# Patient Record
Sex: FEMALE | Race: WHITE | NOT HISPANIC OR LATINO | Employment: OTHER | URBAN - METROPOLITAN AREA
[De-identification: names, ages, dates, MRNs, and addresses within clinical notes are randomized per-mention and may not be internally consistent; named-entity substitution may affect disease eponyms.]

---

## 2017-08-17 ENCOUNTER — OFFICE VISIT (OUTPATIENT)
Dept: CARDIOLOGY | Facility: CLINIC | Age: 72
End: 2017-08-17

## 2017-08-17 VITALS
DIASTOLIC BLOOD PRESSURE: 89 MMHG | WEIGHT: 155.4 LBS | HEIGHT: 64 IN | HEART RATE: 82 BPM | SYSTOLIC BLOOD PRESSURE: 152 MMHG | BODY MASS INDEX: 26.53 KG/M2

## 2017-08-17 DIAGNOSIS — I10 BENIGN HYPERTENSION: ICD-10-CM

## 2017-08-17 DIAGNOSIS — E78.00 HYPERCHOLESTEROLEMIA: ICD-10-CM

## 2017-08-17 DIAGNOSIS — I47.1 SUPRAVENTRICULAR TACHYCARDIA (HCC): Primary | ICD-10-CM

## 2017-08-17 PROCEDURE — 99214 OFFICE O/P EST MOD 30 MIN: CPT | Performed by: PHYSICIAN ASSISTANT

## 2017-08-17 NOTE — PROGRESS NOTES
[facility] at [facility] - Office Note  Vane Villavicencio         [address] [place] [address]  1945   [phone] (home)      LOCATION:  [place] office.  Visit Type: Follow Up.    PCP:  Darrius Hilton MD    08/17/17   Vane Villavicencio is a 72 y.o.  female  retired.      Chief Complaint: FU on SVT, HTN, HLP.  PROBLEM LIST:  1.  Remote episode of supraventricular tachycardia:  a. First  diagnosed, 06/01/2012.  b. Associated tachypalpitations, dizziness, and dyspnea.  c. EKG post event revealing normal sinus rhythm with normal ME and QT intervals.  d. Echocardiogram 05/01/2014:  Normal LV function.  No valvular wall motion abnormalities.     2. Rheumatoid arthritis.  3. Benign hypertension.  4.  Hypercholesterolemia.  5. Family history of heart disease.      No Known Allergies      Current Outpatient Prescriptions:   •  Abatacept (ORENCIA) 125 MG/ML solution prefilled syringe, Inject  under the skin 1 (one) time per week., Disp: , Rfl:   •  CARTIA  MG 24 hr capsule, TAKE 1 CAPSULE BY MOUTH ONE TIME A DAY , Disp: 20 capsule, Rfl: 1  •  fluticasone (FLONASE) 50 MCG/ACT nasal spray, 2 sprays into each nostril as needed. Administer 2 sprays in each nostril for each dose. , Disp: , Rfl:   •  leflunomide (ARAVA) 20 MG tablet, Take 20 mg by mouth daily., Disp: , Rfl:   •  levothyroxine (SYNTHROID, LEVOTHROID) 75 MCG tablet, Take 75 mcg by mouth daily., Disp: , Rfl:   •  LORazepam (ATIVAN) 0.5 MG tablet, Take 0.5 mg by mouth every 8 (eight) hours as needed for anxiety., Disp: , Rfl:   •  Multiple Vitamins-Minerals ([employer] BONE HEALTH PO), Take 1 tablet by mouth daily., Disp: , Rfl:   •  pravastatin (PRAVACHOL) 80 MG tablet, Take 80 mg by mouth daily., Disp: , Rfl:   •  predniSONE (DELTASONE) 2.5 MG tablet, Take 5 mg by mouth daily., Disp: , Rfl:   •  valsartan-hydrochlorothiazide (DIOVAN-HCT) 320-25 MG per tablet, Take 1 tablet by mouth daily., Disp: , Rfl:     HPI    Ms. Villavicencio is  "here today for routine follow-up on long-standing controlled hypertension and dyslipidemia.  She also has several years history worth of SVT.  Her last episode was in 2014.  July 22 she had an event where she was getting ready for work, under a lot of stress with familial issues.  She has sudden onset of tachycardia palpitations, dyspnea and weakness.  She called EMT they were unable to get a blood pressure on her although she had no presyncopal feelings.  She was taken to the local hospital where she was given IV adenosine to break her rhythm.  She is done well since then without any complaints or symptoms.  She states this often happens when she is under a lot of stress.  She has not missed any of her medications.  Her blood work from that visit showed continued hyponatremia and hypokalemia (3).  She is discussed these levels with her rheumatologist in the past but no change has been made to her medications.  In the last 2 weeks, she states her blood pressure has been elevating some but again, she attributes this to stress.    The following portions of the patient's history were reviewed in the chart and updated as appropriate: allergies, current medications, past family history, past medical history, past social history, past surgical history and problem list.    Review of Systems   Constitution: Negative for weakness.   Cardiovascular: Positive for irregular heartbeat and palpitations. Negative for chest pain and near-syncope.   Respiratory: Negative for shortness of breath.    Musculoskeletal: Positive for arthritis.   All other systems reviewed and are negative.            height is 64\" (162.6 cm) and weight is 155 lb 6.4 oz (70.5 kg). Her blood pressure is 152/89 and her pulse is 82.   Physical Exam   Constitutional: Vital signs are normal. She appears well-developed and well-nourished.   Cardiovascular: Normal rate, regular rhythm, S1 normal, S2 normal, normal heart sounds, intact distal pulses and normal " pulses.    Pulmonary/Chest: Effort normal and breath sounds normal. She has no wheezes. She has no rhonchi. She has no rales.   Abdominal: Soft. Normal appearance and bowel sounds are normal. There is no hepatosplenomegaly.   Musculoskeletal:        Right hand: She exhibits deformity.        Left hand: She exhibits deformity.   Neurological: She is alert.         Procedures   Lipid panel: , , HDL 33, .  Sodium 132, potassium 3, creatinine 1.3.  TSH 0.77, free T4 was elevated.  Assessment/ Plan   Supraventricular tachycardia:  This was her first episode in 3 years.  At this time and went to continue on her current dose of Cardizem.  We are closely following her hypertension and may need to make medication changes in the near future.    Hypercholesterolemia:  Continue Pravachol and appropriate diet.  Get routine lipid panel and CMP with PCP.    Benign hypertension:  Poorly controlled today.  I've asked the patient keep a blood pressure log for the next 2 weeks and we will call her to discuss.  I did note her blood work from July ED visit and compared it to laboratory values drawn in May.  She is consistently been hyponatremic and hypokalemic.  I discussed with her that we will most likely need to stop or change her Diovan HCTZ and increase her Cardizem.  Further recognitions will be based on blood pressure readings.      Ludy Beckett PA-C  8/17/2017 11:14 AM      EMR Dragon/Transcription disclaimer:   Much of this encounter note is an electronic transcription/translation of spoken language to printed text. The electronic translation of spoken language may permit erroneous, or at times, nonsensical words or phrases to be inadvertently transcribed; Although I have reviewed the note for such errors, some may still exist.

## 2017-08-28 ENCOUNTER — TELEPHONE (OUTPATIENT)
Dept: CARDIOLOGY | Facility: CLINIC | Age: 72
End: 2017-08-28

## 2017-08-28 DIAGNOSIS — I15.9 SECONDARY HYPERTENSION: Primary | ICD-10-CM

## 2017-08-28 NOTE — TELEPHONE ENCOUNTER
Called patient for b/p readings:  8/17/17  /66  76  /73 76  8/18/17  AM  131/73  73  PM  123/73  68  8/19/17  AM  129/76   72  Pm  128/67  72  8/20/17  AM   129/74   76  PM  123/71 76  8/21/17  AM  137/79  73  8/22/17  AM  131/72  81 PM  127/72  68  8/23/17  AM  141/81  66  PM  138/67  79  8/24/17  AM  130/68  80  PM  124/67  70  8/25/17  AM  127/63  84  PM  117/70  78  8/26/17  /68  78    Barbara Case reviewed b/p readings in clinic. No changes made. Wants a BMP in one month. Told patient this and she verbalized understanding.

## 2017-10-03 ENCOUNTER — LAB (OUTPATIENT)
Dept: LAB | Facility: HOSPITAL | Age: 72
End: 2017-10-03

## 2017-10-03 DIAGNOSIS — I15.9 SECONDARY HYPERTENSION: ICD-10-CM

## 2017-10-03 LAB
ANION GAP SERPL CALCULATED.3IONS-SCNC: 13 MMOL/L (ref 3–11)
BUN BLD-MCNC: 12 MG/DL (ref 9–23)
BUN/CREAT SERPL: 15 (ref 7–25)
CALCIUM SPEC-SCNC: 9.8 MG/DL (ref 8.7–10.4)
CHLORIDE SERPL-SCNC: 97 MMOL/L (ref 99–109)
CO2 SERPL-SCNC: 26 MMOL/L (ref 20–31)
CREAT BLD-MCNC: 0.8 MG/DL (ref 0.6–1.3)
GFR SERPL CREATININE-BSD FRML MDRD: 71 ML/MIN/1.73
GLUCOSE BLD-MCNC: 90 MG/DL (ref 70–100)
POTASSIUM BLD-SCNC: 3.5 MMOL/L (ref 3.5–5.5)
SODIUM BLD-SCNC: 136 MMOL/L (ref 132–146)

## 2017-10-03 PROCEDURE — 80048 BASIC METABOLIC PNL TOTAL CA: CPT | Performed by: PHYSICIAN ASSISTANT

## 2017-10-03 PROCEDURE — 36415 COLL VENOUS BLD VENIPUNCTURE: CPT

## 2017-11-05 ENCOUNTER — HOSPITAL ENCOUNTER (EMERGENCY)
Facility: HOSPITAL | Age: 72
Discharge: HOME | End: 2017-11-05
Attending: EMERGENCY MEDICINE | Admitting: EMERGENCY MEDICINE

## 2017-11-05 VITALS
TEMPERATURE: 96.8 F | SYSTOLIC BLOOD PRESSURE: 110 MMHG | DIASTOLIC BLOOD PRESSURE: 65 MMHG | HEIGHT: 64 IN | BODY MASS INDEX: 26.12 KG/M2 | RESPIRATION RATE: 18 BRPM | WEIGHT: 153 LBS | HEART RATE: 84 BPM | OXYGEN SATURATION: 96 %

## 2017-11-05 DIAGNOSIS — I47.1 SUPRAVENTRICULAR TACHYCARDIA (HCC): Primary | ICD-10-CM

## 2017-11-05 LAB
ANION GAP SERPL CALCULATED.3IONS-SCNC: 19.2 MMOL/L
BASOPHILS # BLD AUTO: 0.12 10*3/MM3 (ref 0–0.2)
BASOPHILS NFR BLD AUTO: 1.1 % (ref 0–2.5)
BUN BLD-MCNC: 15 MG/DL (ref 7–20)
BUN/CREAT SERPL: 12.5 (ref 7.1–23.5)
CALCIUM SPEC-SCNC: 10.3 MG/DL (ref 8.4–10.2)
CHLORIDE SERPL-SCNC: 94 MMOL/L (ref 98–107)
CO2 SERPL-SCNC: 23 MMOL/L (ref 26–30)
CREAT BLD-MCNC: 1.2 MG/DL (ref 0.6–1.3)
DEPRECATED RDW RBC AUTO: 41.2 FL (ref 37–54)
EOSINOPHIL # BLD AUTO: 0.04 10*3/MM3 (ref 0–0.7)
EOSINOPHIL NFR BLD AUTO: 0.4 % (ref 0–7)
ERYTHROCYTE [DISTWIDTH] IN BLOOD BY AUTOMATED COUNT: 12.8 % (ref 11.5–14.5)
GFR SERPL CREATININE-BSD FRML MDRD: 44 ML/MIN/1.73
GLUCOSE BLD-MCNC: 164 MG/DL (ref 74–98)
HCT VFR BLD AUTO: 38.3 % (ref 37–47)
HGB BLD-MCNC: 12.8 G/DL (ref 12–16)
HOLD SPECIMEN: NORMAL
HOLD SPECIMEN: NORMAL
IMM GRANULOCYTES # BLD: 0.04 10*3/MM3 (ref 0–0.06)
IMM GRANULOCYTES NFR BLD: 0.4 % (ref 0–0.6)
LYMPHOCYTES # BLD AUTO: 1.6 10*3/MM3 (ref 0.6–3.4)
LYMPHOCYTES NFR BLD AUTO: 14.1 % (ref 10–50)
MAGNESIUM SERPL-MCNC: 1.9 MG/DL (ref 1.6–2.3)
MCH RBC QN AUTO: 29.4 PG (ref 27–31)
MCHC RBC AUTO-ENTMCNC: 33.4 G/DL (ref 30–37)
MCV RBC AUTO: 88 FL (ref 81–99)
MONOCYTES # BLD AUTO: 0.62 10*3/MM3 (ref 0–0.9)
MONOCYTES NFR BLD AUTO: 5.5 % (ref 0–12)
NEUTROPHILS # BLD AUTO: 8.9 10*3/MM3 (ref 2–6.9)
NEUTROPHILS NFR BLD AUTO: 78.5 % (ref 37–80)
NRBC BLD MANUAL-RTO: 0 /100 WBC (ref 0–0)
PLATELET # BLD AUTO: 232 10*3/MM3 (ref 130–400)
PMV BLD AUTO: 11 FL (ref 6–12)
POTASSIUM BLD-SCNC: 3.2 MMOL/L (ref 3.5–5.1)
RBC # BLD AUTO: 4.35 10*6/MM3 (ref 4.2–5.4)
SODIUM BLD-SCNC: 133 MMOL/L (ref 137–145)
WBC NRBC COR # BLD: 11.32 10*3/MM3 (ref 4.8–10.8)
WHOLE BLOOD HOLD SPECIMEN: NORMAL
WHOLE BLOOD HOLD SPECIMEN: NORMAL

## 2017-11-05 PROCEDURE — 93005 ELECTROCARDIOGRAM TRACING: CPT

## 2017-11-05 PROCEDURE — 93005 ELECTROCARDIOGRAM TRACING: CPT | Performed by: EMERGENCY MEDICINE

## 2017-11-05 PROCEDURE — 25010000002 ADENOSINE PER 6 MG

## 2017-11-05 PROCEDURE — 96374 THER/PROPH/DIAG INJ IV PUSH: CPT

## 2017-11-05 PROCEDURE — 80048 BASIC METABOLIC PNL TOTAL CA: CPT | Performed by: EMERGENCY MEDICINE

## 2017-11-05 PROCEDURE — 99284 EMERGENCY DEPT VISIT MOD MDM: CPT

## 2017-11-05 PROCEDURE — 85025 COMPLETE CBC W/AUTO DIFF WBC: CPT | Performed by: EMERGENCY MEDICINE

## 2017-11-05 PROCEDURE — 83735 ASSAY OF MAGNESIUM: CPT | Performed by: EMERGENCY MEDICINE

## 2017-11-05 RX ORDER — ADENOSINE 3 MG/ML
6 INJECTION, SOLUTION INTRAVENOUS ONCE
Status: COMPLETED | OUTPATIENT
Start: 2017-11-05 | End: 2017-11-05

## 2017-11-05 RX ORDER — POTASSIUM CHLORIDE 1.5 G/1.77G
40 POWDER, FOR SOLUTION ORAL ONCE
Status: COMPLETED | OUTPATIENT
Start: 2017-11-05 | End: 2017-11-05

## 2017-11-05 RX ORDER — POTASSIUM CHLORIDE 20 MEQ/1
40 TABLET, EXTENDED RELEASE ORAL DAILY
Qty: 6 TABLET | Refills: 0 | Status: SHIPPED | OUTPATIENT
Start: 2017-11-05 | End: 2017-11-08

## 2017-11-05 RX ORDER — ADENOSINE 3 MG/ML
INJECTION, SOLUTION INTRAVENOUS
Status: COMPLETED
Start: 2017-11-05 | End: 2017-11-05

## 2017-11-05 RX ORDER — SODIUM CHLORIDE 0.9 % (FLUSH) 0.9 %
10 SYRINGE (ML) INJECTION AS NEEDED
Status: DISCONTINUED | OUTPATIENT
Start: 2017-11-05 | End: 2017-11-05 | Stop reason: HOSPADM

## 2017-11-05 RX ORDER — CIPROFLOXACIN 500 MG/1
500 TABLET, FILM COATED ORAL 2 TIMES DAILY
COMMUNITY
End: 2020-02-20 | Stop reason: HOSPADM

## 2017-11-05 RX ADMIN — ADENOSINE 6 MG: 3 INJECTION, SOLUTION INTRAVENOUS at 13:10

## 2017-11-05 RX ADMIN — POTASSIUM CHLORIDE 40 MEQ: 1.5 POWDER, FOR SOLUTION ORAL at 13:49

## 2017-11-05 NOTE — ED PROVIDER NOTES
Subjective   History of Present Illness  72-year-old female with a history of supraventricular tachycardia presenting with palpitations, brief episode of lightheadedness, diaphoresis similar to prior episodes of SVT.  States that this often occurs when she is on antibiotics and she was recently placed on Cipro for UTI.  She denies any chest pain, lightheadedness, or other symptoms at this time but does feel as though her heart rate is still elevated.  On monitor, she is noted to be tachycardic with a heart rate from 165-185 consistent with SVT.    Review of Systems   Cardiovascular: Positive for palpitations.   All other systems reviewed and are negative.      Past Medical History:   Diagnosis Date   • Benign hypertension    • Family history of heart disease    • Family history of heart disease    • Hypercholesterolemia    • Rheumatoid arthritis    • Rheumatoid arthritis    • Supraventricular tachycardia     first diagnosed 06/01/2012   • Supraventricular tachycardia        No Known Allergies    History reviewed. No pertinent surgical history.    Family History   Problem Relation Age of Onset   • Heart disease Other    • Hypertension Sister        Social History     Social History   • Marital status:      Spouse name: N/A   • Number of children: N/A   • Years of education: N/A     Social History Main Topics   • Smoking status: Never Smoker   • Smokeless tobacco: None   • Alcohol use No   • Drug use: No   • Sexual activity: Defer     Other Topics Concern   • None     Social History Narrative           Objective   Physical Exam   Constitutional: She is oriented to person, place, and time. She appears well-developed and well-nourished. No distress.   HENT:   Head: Normocephalic.   Mouth/Throat: Oropharynx is clear and moist. No oropharyngeal exudate.   Eyes: No scleral icterus.   Neck: Neck supple. No tracheal deviation present.   Cardiovascular: Regular rhythm, normal heart sounds and intact distal pulses.   Exam reveals no gallop and no friction rub.    No murmur heard.  Tachycardic to the 160s on arrival.   Pulmonary/Chest: Effort normal and breath sounds normal. No stridor. No respiratory distress. She has no wheezes. She has no rales.   Abdominal: Soft. She exhibits no distension and no mass. There is no tenderness. There is no rebound and no guarding. No hernia.   Musculoskeletal: She exhibits no edema or deformity.   Neurological: She is alert and oriented to person, place, and time.   Skin: Skin is warm and dry. She is not diaphoretic. No erythema. No pallor.   Psychiatric: She has a normal mood and affect. Her behavior is normal.   Nursing note and vitals reviewed.      Procedures         ED Course  ED Course                  MDM   72-year-old female with a history of episodic SVT here with palpitations in the setting of SVT.  She was initially slightly hypotensive and symptomatic but by the time I saw her stated that she was feeling at baseline other than her palpitations and her blood pressure had improved to the 100s over 70s.  We placed pads, established IV, and gave 6 mg of adenosine which converted her to a normal sinus rhythm.  Repeat EKG showed a normal sinus rhythm with normal intervals and no ST elevation or depression.  We have sent lab work including CBC, BMP, magnesium, we'll continue to monitor, and if she remains stable, discharge home with information to follow-up with Dr. Olmos.    2:07 PM Pt has remained stable since receiving adenosine with NSR. Labs remarkable for mild hypokalemia (3.2). Given 40meq PO and will d/c with three days of 40meq. Will also give information to f/u w/ Dr. Olmos. Discussed return to care precautions.     Final diagnoses:   Supraventricular tachycardia            Juan Manuel Gr MD  11/05/17 4820

## 2018-01-19 ENCOUNTER — DOCUMENTATION (OUTPATIENT)
Dept: CARDIOLOGY | Facility: CLINIC | Age: 73
End: 2018-01-19

## 2018-09-05 ENCOUNTER — HOSPITAL ENCOUNTER (EMERGENCY)
Age: 73
Discharge: HOME | End: 2018-09-05
Attending: EMERGENCY MEDICINE
Payer: MEDICARE

## 2018-09-05 VITALS
DIASTOLIC BLOOD PRESSURE: 88 MMHG | BODY MASS INDEX: 25.99 KG/M2 | WEIGHT: 156 LBS | RESPIRATION RATE: 18 BRPM | OXYGEN SATURATION: 97 % | TEMPERATURE: 98.4 F | SYSTOLIC BLOOD PRESSURE: 166 MMHG | HEART RATE: 78 BPM | HEIGHT: 65 IN

## 2018-09-05 DIAGNOSIS — E87.6 HYPOKALEMIA: Primary | ICD-10-CM

## 2018-09-05 DIAGNOSIS — E83.42 HYPOMAGNESEMIA: ICD-10-CM

## 2018-09-05 DIAGNOSIS — E87.1 HYPONATREMIA: ICD-10-CM

## 2018-09-05 LAB
ANION GAP SERPL CALCULATED.3IONS-SCNC: 15 MMOL/L (ref 3–16)
BUN BLDV-MCNC: 10 MG/DL (ref 7–20)
CALCIUM SERPL-MCNC: 9.3 MG/DL (ref 8.3–10.6)
CHLORIDE BLD-SCNC: 86 MMOL/L (ref 99–110)
CO2: 26 MMOL/L (ref 21–32)
CREAT SERPL-MCNC: 0.7 MG/DL (ref 0.6–1.2)
GFR AFRICAN AMERICAN: >60
GFR NON-AFRICAN AMERICAN: >60
GLUCOSE BLD-MCNC: 117 MG/DL (ref 70–99)
MAGNESIUM: 1.5 MG/DL (ref 1.8–2.4)
POTASSIUM REFLEX MAGNESIUM: 3.3 MMOL/L (ref 3.5–5.1)
SODIUM BLD-SCNC: 127 MMOL/L (ref 136–145)
TROPONIN: <0.01 NG/ML

## 2018-09-05 PROCEDURE — 84484 ASSAY OF TROPONIN QUANT: CPT

## 2018-09-05 PROCEDURE — 6370000000 HC RX 637 (ALT 250 FOR IP): Performed by: EMERGENCY MEDICINE

## 2018-09-05 PROCEDURE — 93005 ELECTROCARDIOGRAM TRACING: CPT | Performed by: EMERGENCY MEDICINE

## 2018-09-05 PROCEDURE — 99284 EMERGENCY DEPT VISIT MOD MDM: CPT

## 2018-09-05 PROCEDURE — 83735 ASSAY OF MAGNESIUM: CPT

## 2018-09-05 PROCEDURE — 36415 COLL VENOUS BLD VENIPUNCTURE: CPT

## 2018-09-05 PROCEDURE — 80048 BASIC METABOLIC PNL TOTAL CA: CPT

## 2018-09-05 RX ORDER — POTASSIUM CHLORIDE 750 MG/1
10 TABLET, EXTENDED RELEASE ORAL ONCE
Status: COMPLETED | OUTPATIENT
Start: 2018-09-05 | End: 2018-09-05

## 2018-09-05 RX ORDER — LEVOTHYROXINE SODIUM 0.05 MG/1
50 TABLET ORAL DAILY
COMMUNITY

## 2018-09-05 RX ORDER — LEFLUNOMIDE 20 MG/1
20 TABLET ORAL DAILY
COMMUNITY

## 2018-09-05 RX ORDER — MAGNESIUM CHLORIDE 64 MG
1 TABLET, DELAYED RELEASE (ENTERIC COATED) ORAL
Status: DISCONTINUED | OUTPATIENT
Start: 2018-09-06 | End: 2018-09-05

## 2018-09-05 RX ORDER — MONTELUKAST SODIUM 10 MG/1
10 TABLET ORAL NIGHTLY
COMMUNITY

## 2018-09-05 RX ORDER — PREDNISONE 1 MG/1
5 TABLET ORAL DAILY
COMMUNITY

## 2018-09-05 RX ORDER — DILTIAZEM HYDROCHLORIDE 180 MG/1
180 CAPSULE, COATED, EXTENDED RELEASE ORAL DAILY
COMMUNITY

## 2018-09-05 RX ORDER — LOSARTAN POTASSIUM 50 MG/1
50 TABLET ORAL DAILY
COMMUNITY

## 2018-09-05 RX ADMIN — POTASSIUM CHLORIDE 10 MEQ: 10 TABLET, EXTENDED RELEASE ORAL at 22:05

## 2018-09-06 PROCEDURE — 93010 ELECTROCARDIOGRAM REPORT: CPT | Performed by: INTERNAL MEDICINE

## 2018-09-06 NOTE — ED NOTES
This RN called pharmacy to check for availability of oral mag, was told by pharm we did not have that option. This RN informed MD, MD informed RN to cancel mag order for pt and to tell pt to purchase OTC mag supplement.       Alisa Yee RN  09/05/18 [id number]

## 2018-09-06 NOTE — ED PROVIDER NOTES
Emergency Department Attending Note    Joselyn Marin MD    Date of ED VIsit: 9/5/2018    CHIEF COMPLAINT  Hypertension (pt states she had increased bp of 180's/110's this pm, took extra BP pill an hour PTA and bp is still high, has hx of SVT, denies current CP, states she also got flushed and had heartburn at the time of the initial BP reading )      HISTORY OF PRESENT ILLNESS  Alonso Matson is a 72 y.o. female  With Vital signs of BP (!) 183/105   Pulse 96   Temp 98.4 °F (36.9 °C) (Oral)   Resp 18   Ht 5' 4.5\" (1.638 m)   Wt 156 lb (70.8 kg)   SpO2 99%   BMI 26.36 kg/m²  who presents to the ED with a complaint of blood pressure. Patient seen and evaluated in room 7. Patient comes in complaining of a blood pressure of 180s over 110 which is abnormal for her. She states she took her losartan this morning and then took a repeat dose this evening in response to that blood pressure. She denied any chest pain but that did not did state that she had some indigestion which is resolved spontaneously. She denies any neurologic symptoms. No shortness of breath. No nausea vomiting diarrhea constipation. She states that she was originally on Diovan but was removed from that and placed on losartan and since then has had some mild fluctuations in her blood pressure not to the point that she seen today. Repeat blood pressures in the room during my exam revealed a systolic blood pressure 892 with a diastolic blood pressure in the 90s patient also reports taking lorazepam one half tablet for her anxiety as well No other complaints, modifying factors or associated symptoms. I have reviewed the following from the nursing documentation. No past medical history on file. No past surgical history on file. No family history on file. Social History     Social History    Marital status:      Spouse name: N/A    Number of children: N/A    Years of education: N/A     Occupational History    Not on file.

## 2018-09-06 NOTE — ED NOTES
Pt to ER via self, pt states she had increased bp of 180's/110's this pm, took extra BP pill an hour PTA and bp is still high, has hx of SVT, denies current CP, states she also got flushed and had heartburn at the time of the initial BP reading. Pt alert and oriented, EKG completed and pt on monitor. MD as bedside. Pt without current distress. Call light within reach, will cont to monitor.       Zeinab Fraknlin RN  09/05/18 1497

## 2018-09-21 LAB
EKG ATRIAL RATE: 93 BPM
EKG DIAGNOSIS: NORMAL
EKG P AXIS: 58 DEGREES
EKG P-R INTERVAL: 144 MS
EKG Q-T INTERVAL: 374 MS
EKG QRS DURATION: 94 MS
EKG QTC CALCULATION (BAZETT): 465 MS
EKG R AXIS: 79 DEGREES
EKG T AXIS: 57 DEGREES
EKG VENTRICULAR RATE: 93 BPM

## 2019-09-19 ENCOUNTER — TRANSCRIBE ORDERS (OUTPATIENT)
Dept: MAMMOGRAPHY | Facility: HOSPITAL | Age: 74
End: 2019-09-19

## 2019-09-19 DIAGNOSIS — Z12.39 BREAST CANCER SCREENING: Primary | ICD-10-CM

## 2019-10-12 ENCOUNTER — HOSPITAL ENCOUNTER (EMERGENCY)
Facility: HOSPITAL | Age: 74
Discharge: HOME | End: 2019-10-12
Attending: STUDENT IN AN ORGANIZED HEALTH CARE EDUCATION/TRAINING PROGRAM | Admitting: STUDENT IN AN ORGANIZED HEALTH CARE EDUCATION/TRAINING PROGRAM

## 2019-10-12 ENCOUNTER — APPOINTMENT (OUTPATIENT)
Dept: GENERAL RADIOLOGY | Facility: HOSPITAL | Age: 74
End: 2019-10-12

## 2019-10-12 VITALS
DIASTOLIC BLOOD PRESSURE: 71 MMHG | HEART RATE: 90 BPM | WEIGHT: 161.4 LBS | SYSTOLIC BLOOD PRESSURE: 129 MMHG | TEMPERATURE: 97.6 F | RESPIRATION RATE: 18 BRPM | BODY MASS INDEX: 27.55 KG/M2 | OXYGEN SATURATION: 97 % | HEIGHT: 64 IN

## 2019-10-12 DIAGNOSIS — I47.1 SVT (SUPRAVENTRICULAR TACHYCARDIA) (HCC): Primary | ICD-10-CM

## 2019-10-12 LAB
ALBUMIN SERPL-MCNC: 4.1 G/DL (ref 3.5–5.2)
ALBUMIN/GLOB SERPL: 1.2 G/DL
ALP SERPL-CCNC: 66 U/L (ref 39–117)
ALT SERPL W P-5'-P-CCNC: 18 U/L (ref 1–33)
ANION GAP SERPL CALCULATED.3IONS-SCNC: 18.5 MMOL/L (ref 5–15)
AST SERPL-CCNC: 22 U/L (ref 1–32)
BASOPHILS # BLD AUTO: 0.17 10*3/MM3 (ref 0–0.2)
BASOPHILS NFR BLD AUTO: 1.4 % (ref 0–1.5)
BILIRUB SERPL-MCNC: 0.3 MG/DL (ref 0.2–1.2)
BUN BLD-MCNC: 13 MG/DL (ref 8–23)
BUN/CREAT SERPL: 11.6 (ref 7–25)
CALCIUM SPEC-SCNC: 10 MG/DL (ref 8.6–10.5)
CHLORIDE SERPL-SCNC: 91 MMOL/L (ref 98–107)
CO2 SERPL-SCNC: 23.5 MMOL/L (ref 22–29)
CREAT BLD-MCNC: 1.12 MG/DL (ref 0.57–1)
DEPRECATED RDW RBC AUTO: 44.2 FL (ref 37–54)
EOSINOPHIL # BLD AUTO: 0.23 10*3/MM3 (ref 0–0.4)
EOSINOPHIL NFR BLD AUTO: 1.9 % (ref 0.3–6.2)
ERYTHROCYTE [DISTWIDTH] IN BLOOD BY AUTOMATED COUNT: 13.2 % (ref 12.3–15.4)
GFR SERPL CREATININE-BSD FRML MDRD: 48 ML/MIN/1.73
GLOBULIN UR ELPH-MCNC: 3.5 GM/DL
GLUCOSE BLD-MCNC: 166 MG/DL (ref 65–99)
HCT VFR BLD AUTO: 38.5 % (ref 34–46.6)
HGB BLD-MCNC: 12.1 G/DL (ref 12–15.9)
HOLD SPECIMEN: NORMAL
HOLD SPECIMEN: NORMAL
IMM GRANULOCYTES # BLD AUTO: 0.04 10*3/MM3 (ref 0–0.05)
IMM GRANULOCYTES NFR BLD AUTO: 0.3 % (ref 0–0.5)
LIPASE SERPL-CCNC: 42 U/L (ref 13–60)
LYMPHOCYTES # BLD AUTO: 4.02 10*3/MM3 (ref 0.7–3.1)
LYMPHOCYTES NFR BLD AUTO: 33.3 % (ref 19.6–45.3)
MCH RBC QN AUTO: 28.5 PG (ref 26.6–33)
MCHC RBC AUTO-ENTMCNC: 31.4 G/DL (ref 31.5–35.7)
MCV RBC AUTO: 90.8 FL (ref 79–97)
MONOCYTES # BLD AUTO: 1.03 10*3/MM3 (ref 0.1–0.9)
MONOCYTES NFR BLD AUTO: 8.5 % (ref 5–12)
NEUTROPHILS # BLD AUTO: 6.58 10*3/MM3 (ref 1.7–7)
NEUTROPHILS NFR BLD AUTO: 54.6 % (ref 42.7–76)
NRBC BLD AUTO-RTO: 0 /100 WBC (ref 0–0.2)
PLATELET # BLD AUTO: 302 10*3/MM3 (ref 140–450)
PMV BLD AUTO: 10.3 FL (ref 6–12)
POTASSIUM BLD-SCNC: 3.8 MMOL/L (ref 3.5–5.2)
PROT SERPL-MCNC: 7.6 G/DL (ref 6–8.5)
RBC # BLD AUTO: 4.24 10*6/MM3 (ref 3.77–5.28)
SODIUM BLD-SCNC: 133 MMOL/L (ref 136–145)
TROPONIN T SERPL-MCNC: <0.01 NG/ML (ref 0–0.03)
WBC NRBC COR # BLD: 12.07 10*3/MM3 (ref 3.4–10.8)
WHOLE BLOOD HOLD SPECIMEN: NORMAL
WHOLE BLOOD HOLD SPECIMEN: NORMAL

## 2019-10-12 PROCEDURE — 93005 ELECTROCARDIOGRAM TRACING: CPT | Performed by: STUDENT IN AN ORGANIZED HEALTH CARE EDUCATION/TRAINING PROGRAM

## 2019-10-12 PROCEDURE — 25010000002 ADENOSINE PER 6 MG

## 2019-10-12 PROCEDURE — 99284 EMERGENCY DEPT VISIT MOD MDM: CPT

## 2019-10-12 PROCEDURE — 84484 ASSAY OF TROPONIN QUANT: CPT | Performed by: STUDENT IN AN ORGANIZED HEALTH CARE EDUCATION/TRAINING PROGRAM

## 2019-10-12 PROCEDURE — 71045 X-RAY EXAM CHEST 1 VIEW: CPT

## 2019-10-12 PROCEDURE — 85025 COMPLETE CBC W/AUTO DIFF WBC: CPT | Performed by: STUDENT IN AN ORGANIZED HEALTH CARE EDUCATION/TRAINING PROGRAM

## 2019-10-12 PROCEDURE — 80053 COMPREHEN METABOLIC PANEL: CPT | Performed by: STUDENT IN AN ORGANIZED HEALTH CARE EDUCATION/TRAINING PROGRAM

## 2019-10-12 PROCEDURE — 83690 ASSAY OF LIPASE: CPT | Performed by: STUDENT IN AN ORGANIZED HEALTH CARE EDUCATION/TRAINING PROGRAM

## 2019-10-12 RX ORDER — ADENOSINE 3 MG/ML
INJECTION, SOLUTION INTRAVENOUS
Status: COMPLETED
Start: 2019-10-12 | End: 2019-10-12

## 2019-10-12 RX ORDER — SODIUM CHLORIDE 0.9 % (FLUSH) 0.9 %
10 SYRINGE (ML) INJECTION AS NEEDED
Status: DISCONTINUED | OUTPATIENT
Start: 2019-10-12 | End: 2019-10-12 | Stop reason: HOSPADM

## 2019-10-12 RX ADMIN — ADENOSINE 6 MG: 3 INJECTION INTRAVENOUS at 14:34

## 2019-10-12 NOTE — ED PROVIDER NOTES
Subjective   Patient is a 74-year-old female presents with heart racing and pain in her neck started suddenly less than 20 minutes ago.  Patient states that she believes she is having an SVT attack.  Patient states she has these once or twice a year and has for the past 7 years.  Patient states normally they give her adenosine and her heart rate normalizes.  She states there was no trigger.  States she did try to convert herself while driving here by holding her breath.  Nothing makes this better or worse.  Patient states she does feel very nervous and short of breath.            Review of Systems   All other systems reviewed and are negative.      Past Medical History:   Diagnosis Date   • Benign hypertension    • Family history of heart disease    • Family history of heart disease    • Hypercholesterolemia    • Rheumatoid arthritis (CMS/HCC)    • Rheumatoid arthritis (CMS/HCC)    • Supraventricular tachycardia (CMS/HCC)     first diagnosed 06/01/2012   • Supraventricular tachycardia (CMS/HCC)        No Known Allergies    History reviewed. No pertinent surgical history.    Family History   Problem Relation Age of Onset   • Heart disease Other    • Hypertension Sister        Social History     Socioeconomic History   • Marital status:      Spouse name: Not on file   • Number of children: Not on file   • Years of education: Not on file   • Highest education level: Not on file   Tobacco Use   • Smoking status: Never Smoker   Substance and Sexual Activity   • Alcohol use: No   • Drug use: No   • Sexual activity: Defer           Objective   Physical Exam   Nursing note and vitals reviewed.    GEN: Patient appears frightened   head: Normocephalic, atraumatic  Eyes: Pupils equal round reactive to light  ENT: Posterior pharynx normal in appearance, oral mucosa is moist  Chest: Nontender to palpation  Cardiovascular: Regular in the 190s   lungs: Clear to auscultation bilaterally  Abdomen: Soft, nontender,  nondistended, no peritoneal signs  Neuro: GCS 15  Psych: Appears anxious      Chemical Cardioversion  Date/Time: 10/12/2019 4:18 PM  Performed by: Miguel A Dimas MD  Authorized by: Miguel A Dimas MD     Consent:     Consent obtained:  Verbal    Consent given by:  Patient    Risks discussed:  Death, induced arrhythmia and pain  Pre-procedure details:     Cardioversion basis:  Emergent    Rhythm:  Supraventricular tachycardia  Attempt one:     Cardioversion medication:  Adenosine 6mg      Medication outcome:  Conversion to normal sinus rhythm  Post-procedure details:     Patient status:  Awake    Patient tolerance of procedure:  Tolerated well, no immediate complications               ED Course  ED Course as of Oct 12 1616   Sat Oct 12, 2019   1515 EKG shows SVT with a rate of 191.  Nonspecific ST segments.  Abnormal EKG.  Interpreted by me.  [DT]      ED Course User Index  [DT] Miguel A Dimas MD                  MDM  Number of Diagnoses or Management Options  SVT (supraventricular tachycardia) (CMS/HCC):   Diagnosis management comments: Patient was chemically cardioverted successfully with one attempt with 6 mg of adenosine  No significant laboratory abnormalities  Chest x-ray shows no acute cardiopulmonary pathology        35 minutes critical care time was spent by the attending physician excluding separately billable procedures                 Amount and/or Complexity of Data Reviewed  Clinical lab tests: reviewed  Tests in the radiology section of CPT®: reviewed  Discussion of test results with the performing providers: yes  Decide to obtain previous medical records or to obtain history from someone other than the patient: yes  Obtain history from someone other than the patient: yes  Review and summarize past medical records: yes  Independent visualization of images, tracings, or specimens: yes        Final diagnoses:   SVT (supraventricular tachycardia) (CMS/HCC)              Miguel A Dimas MD  10/12/19  [id number]

## 2019-11-19 ENCOUNTER — HOSPITAL ENCOUNTER (OUTPATIENT)
Dept: MAMMOGRAPHY | Facility: HOSPITAL | Age: 74
End: 2019-11-19

## 2019-12-13 ENCOUNTER — HOSPITAL ENCOUNTER (EMERGENCY)
Facility: HOSPITAL | Age: 74
Discharge: HOME | End: 2019-12-13
Attending: EMERGENCY MEDICINE | Admitting: EMERGENCY MEDICINE

## 2019-12-13 ENCOUNTER — APPOINTMENT (OUTPATIENT)
Dept: GENERAL RADIOLOGY | Facility: HOSPITAL | Age: 74
End: 2019-12-13

## 2019-12-13 VITALS
DIASTOLIC BLOOD PRESSURE: 80 MMHG | TEMPERATURE: 98.3 F | RESPIRATION RATE: 18 BRPM | SYSTOLIC BLOOD PRESSURE: 140 MMHG | OXYGEN SATURATION: 97 % | WEIGHT: 158 LBS | BODY MASS INDEX: 26.98 KG/M2 | HEART RATE: 78 BPM | HEIGHT: 64 IN

## 2019-12-13 DIAGNOSIS — I47.1 SVT (SUPRAVENTRICULAR TACHYCARDIA) (HCC): Primary | ICD-10-CM

## 2019-12-13 LAB
ALBUMIN SERPL-MCNC: 4.2 G/DL (ref 3.5–5.2)
ALBUMIN/GLOB SERPL: 1.2 G/DL
ALP SERPL-CCNC: 58 U/L (ref 39–117)
ALT SERPL W P-5'-P-CCNC: 19 U/L (ref 1–33)
ANION GAP SERPL CALCULATED.3IONS-SCNC: 18.2 MMOL/L (ref 5–15)
AST SERPL-CCNC: 22 U/L (ref 1–32)
BASOPHILS # BLD AUTO: 0.12 10*3/MM3 (ref 0–0.2)
BASOPHILS NFR BLD AUTO: 1.1 % (ref 0–1.5)
BILIRUB SERPL-MCNC: 0.2 MG/DL (ref 0.2–1.2)
BUN BLD-MCNC: 19 MG/DL (ref 8–23)
BUN/CREAT SERPL: 20.7 (ref 7–25)
CALCIUM SPEC-SCNC: 9.8 MG/DL (ref 8.6–10.5)
CHLORIDE SERPL-SCNC: 90 MMOL/L (ref 98–107)
CO2 SERPL-SCNC: 23.8 MMOL/L (ref 22–29)
CREAT BLD-MCNC: 0.92 MG/DL (ref 0.57–1)
DEPRECATED RDW RBC AUTO: 43.5 FL (ref 37–54)
EOSINOPHIL # BLD AUTO: 0.12 10*3/MM3 (ref 0–0.4)
EOSINOPHIL NFR BLD AUTO: 1.1 % (ref 0.3–6.2)
ERYTHROCYTE [DISTWIDTH] IN BLOOD BY AUTOMATED COUNT: 13.2 % (ref 12.3–15.4)
GFR SERPL CREATININE-BSD FRML MDRD: 60 ML/MIN/1.73
GLOBULIN UR ELPH-MCNC: 3.5 GM/DL
GLUCOSE BLD-MCNC: 127 MG/DL (ref 65–99)
HCT VFR BLD AUTO: 38.6 % (ref 34–46.6)
HGB BLD-MCNC: 12 G/DL (ref 12–15.9)
HOLD SPECIMEN: NORMAL
HOLD SPECIMEN: NORMAL
IMM GRANULOCYTES # BLD AUTO: 0.03 10*3/MM3 (ref 0–0.05)
IMM GRANULOCYTES NFR BLD AUTO: 0.3 % (ref 0–0.5)
LYMPHOCYTES # BLD AUTO: 4.41 10*3/MM3 (ref 0.7–3.1)
LYMPHOCYTES NFR BLD AUTO: 40.8 % (ref 19.6–45.3)
MCH RBC QN AUTO: 28.2 PG (ref 26.6–33)
MCHC RBC AUTO-ENTMCNC: 31.1 G/DL (ref 31.5–35.7)
MCV RBC AUTO: 90.8 FL (ref 79–97)
MONOCYTES # BLD AUTO: 1.09 10*3/MM3 (ref 0.1–0.9)
MONOCYTES NFR BLD AUTO: 10.1 % (ref 5–12)
NEUTROPHILS # BLD AUTO: 5.04 10*3/MM3 (ref 1.7–7)
NEUTROPHILS NFR BLD AUTO: 46.6 % (ref 42.7–76)
NRBC BLD AUTO-RTO: 0 /100 WBC (ref 0–0.2)
NT-PROBNP SERPL-MCNC: 464.7 PG/ML (ref 5–900)
PLATELET # BLD AUTO: 274 10*3/MM3 (ref 140–450)
PMV BLD AUTO: 11.5 FL (ref 6–12)
POTASSIUM BLD-SCNC: 3.6 MMOL/L (ref 3.5–5.2)
PROT SERPL-MCNC: 7.7 G/DL (ref 6–8.5)
RBC # BLD AUTO: 4.25 10*6/MM3 (ref 3.77–5.28)
SODIUM BLD-SCNC: 132 MMOL/L (ref 136–145)
TROPONIN T SERPL-MCNC: <0.01 NG/ML (ref 0–0.03)
WBC NRBC COR # BLD: 10.81 10*3/MM3 (ref 3.4–10.8)
WHOLE BLOOD HOLD SPECIMEN: NORMAL
WHOLE BLOOD HOLD SPECIMEN: NORMAL

## 2019-12-13 PROCEDURE — 96374 THER/PROPH/DIAG INJ IV PUSH: CPT

## 2019-12-13 PROCEDURE — 80053 COMPREHEN METABOLIC PANEL: CPT | Performed by: NURSE PRACTITIONER

## 2019-12-13 PROCEDURE — 85025 COMPLETE CBC W/AUTO DIFF WBC: CPT | Performed by: NURSE PRACTITIONER

## 2019-12-13 PROCEDURE — 93005 ELECTROCARDIOGRAM TRACING: CPT

## 2019-12-13 PROCEDURE — 93005 ELECTROCARDIOGRAM TRACING: CPT | Performed by: NURSE PRACTITIONER

## 2019-12-13 PROCEDURE — 71046 X-RAY EXAM CHEST 2 VIEWS: CPT

## 2019-12-13 PROCEDURE — 25010000002 ADENOSINE PER 6 MG: Performed by: NURSE PRACTITIONER

## 2019-12-13 PROCEDURE — 99283 EMERGENCY DEPT VISIT LOW MDM: CPT

## 2019-12-13 PROCEDURE — 83880 ASSAY OF NATRIURETIC PEPTIDE: CPT | Performed by: NURSE PRACTITIONER

## 2019-12-13 PROCEDURE — 84484 ASSAY OF TROPONIN QUANT: CPT | Performed by: NURSE PRACTITIONER

## 2019-12-13 RX ORDER — ADENOSINE 3 MG/ML
6 INJECTION, SOLUTION INTRAVENOUS ONCE
Status: COMPLETED | OUTPATIENT
Start: 2019-12-13 | End: 2019-12-13

## 2019-12-13 RX ORDER — SODIUM CHLORIDE 0.9 % (FLUSH) 0.9 %
10 SYRINGE (ML) INJECTION AS NEEDED
Status: DISCONTINUED | OUTPATIENT
Start: 2019-12-13 | End: 2019-12-13 | Stop reason: HOSPADM

## 2019-12-13 RX ADMIN — ADENOSINE 6 MG: 3 INJECTION, SOLUTION INTRAVENOUS at 18:14

## 2019-12-13 NOTE — ED PROVIDER NOTES
Subjective   History of Present Illness  There is a 74-year-old female who comes in today complaining of rapid heartbeat that started within the past hour..  She reports she has a history of SVT and has had 3 episodes in the past year.  She states she has been worked up by cardiology including stress test and echocardiogram without any negative results.  She typically converts with adenosine.  Review of Systems   Constitutional: Negative.    HENT: Negative.    Eyes: Negative.    Respiratory: Positive for chest tightness.    Cardiovascular: Positive for palpitations.   Gastrointestinal: Negative.    Genitourinary: Negative.    Skin: Negative.    Neurological: Negative.    Psychiatric/Behavioral: Negative.        Past Medical History:   Diagnosis Date   • Benign hypertension    • Family history of heart disease    • Family history of heart disease    • Hypercholesterolemia    • Rheumatoid arthritis (CMS/HCC)    • Rheumatoid arthritis (CMS/HCC)    • Supraventricular tachycardia (CMS/HCC)     first diagnosed 06/01/2012   • Supraventricular tachycardia (CMS/HCC)        No Known Allergies    History reviewed. No pertinent surgical history.    Family History   Problem Relation Age of Onset   • Heart disease Other    • Hypertension Sister        Social History     Socioeconomic History   • Marital status:      Spouse name: Not on file   • Number of children: Not on file   • Years of education: Not on file   • Highest education level: Not on file   Tobacco Use   • Smoking status: Never Smoker   Substance and Sexual Activity   • Alcohol use: No   • Drug use: No   • Sexual activity: Defer           Objective   Physical Exam   Constitutional: She appears well-developed and well-nourished.   Nursing note and vitals reviewed.  GEN: No acute distress  Head: Normocephalic, atraumatic  Eyes: Pupils equal round reactive to light  ENT: Posterior pharynx normal in appearance, oral mucosa is moist  Chest: Nontender to  palpation  Cardiovascular: Rapid heart rate  Lungs: Clear to auscultation bilaterally  Abdomen: Soft, nontender, nondistended, no peritoneal signs  Extremities: No edema, normal appearance  Neuro: GCS 15  Psych: Mood and affect are appropriate      Chemical Cardioversion  Date/Time: 12/13/2019 6:39 PM  Performed by: Pardeep Castro DO  Authorized by: Pardeep Castro DO     Consent:     Consent obtained:  Verbal    Consent given by:  Patient    Risks discussed:  Death, pain and induced arrhythmia  Pre-procedure details:     Cardioversion basis:  Urgent    Rhythm:  Supraventricular tachycardia  Attempt one:     Cardioversion medication:  Adenosine 6mg      Medication outcome:  Conversion to normal sinus rhythm  Post-procedure details:     Patient status:  Awake    Patient tolerance of procedure:  Tolerated well, no immediate complications               ED Course  ED Course as of Dec 13 1928   Fri Dec 13, 2019   1837 EKG interpreted by me.  Supraventricular rhythm.  Rate of 197.  Left axis deviation with nonspecific ST segment and T wave abnormalities that are rate dependent.  Abnormal EKG    [CG]   1838 Repeat EKG interpreted by me.  Sinus rhythm.  Rate of 101.  Tachycardic.  No ST segment or T wave abnormalities.  This is abnormal EKG but significantly improved.  This EKG is immediately post chemical cardioversion.    [CG]   1838 Repeat EKG interpreted by me.  Sinus rhythm.  Rate of 90.  No ST segment or T wave abnormalities.  Normal EKG.  This EKG is normalized compared to prior EKGs performed today.    [CG]      ED Course User Index  [CG] Pardeep Castro DO                      No data recorded                        MDM  Number of Diagnoses or Management Options     Amount and/or Complexity of Data Reviewed  Clinical lab tests: ordered and reviewed  Tests in the radiology section of CPT®: ordered and reviewed  Review and summarize past medical records: yes  Discuss the patient with other providers:  yes  Independent visualization of images, tracings, or specimens: yes    Risk of Complications, Morbidity, and/or Mortality  Presenting problems: moderate  Diagnostic procedures: moderate  Management options: moderate        Final diagnoses:   SVT (supraventricular tachycardia) (CMS/HCC)              Piper Maier, APRN  12/13/19 1928

## 2020-02-18 ENCOUNTER — TRANSCRIBE ORDERS (OUTPATIENT)
Dept: ADMINISTRATIVE | Facility: HOSPITAL | Age: 75
End: 2020-02-18

## 2020-02-18 DIAGNOSIS — M54.50 LOW BACK PAIN, UNSPECIFIED BACK PAIN LATERALITY, UNSPECIFIED CHRONICITY, UNSPECIFIED WHETHER SCIATICA PRESENT: Primary | ICD-10-CM

## 2020-02-20 ENCOUNTER — APPOINTMENT (OUTPATIENT)
Dept: CT IMAGING | Facility: HOSPITAL | Age: 75
End: 2020-02-20

## 2020-02-20 ENCOUNTER — HOSPITAL ENCOUNTER (EMERGENCY)
Facility: HOSPITAL | Age: 75
Discharge: HOME | End: 2020-02-20
Attending: STUDENT IN AN ORGANIZED HEALTH CARE EDUCATION/TRAINING PROGRAM | Admitting: STUDENT IN AN ORGANIZED HEALTH CARE EDUCATION/TRAINING PROGRAM

## 2020-02-20 VITALS
RESPIRATION RATE: 20 BRPM | SYSTOLIC BLOOD PRESSURE: 149 MMHG | OXYGEN SATURATION: 97 % | DIASTOLIC BLOOD PRESSURE: 86 MMHG | TEMPERATURE: 97.7 F | BODY MASS INDEX: 26.98 KG/M2 | HEART RATE: 72 BPM | WEIGHT: 158 LBS | HEIGHT: 64 IN

## 2020-02-20 DIAGNOSIS — T50.905A ADVERSE DRUG INTERACTION WITH PRESCRIPTION MEDICATION: ICD-10-CM

## 2020-02-20 DIAGNOSIS — R11.2 NON-INTRACTABLE VOMITING WITH NAUSEA, UNSPECIFIED VOMITING TYPE: Primary | ICD-10-CM

## 2020-02-20 DIAGNOSIS — R51.9 ACUTE NONINTRACTABLE HEADACHE, UNSPECIFIED HEADACHE TYPE: ICD-10-CM

## 2020-02-20 LAB
ALBUMIN SERPL-MCNC: 4.1 G/DL (ref 3.5–5.2)
ALBUMIN/GLOB SERPL: 1.4 G/DL
ALP SERPL-CCNC: 57 U/L (ref 39–117)
ALT SERPL W P-5'-P-CCNC: 13 U/L (ref 1–33)
ANION GAP SERPL CALCULATED.3IONS-SCNC: 14.3 MMOL/L (ref 5–15)
AST SERPL-CCNC: 18 U/L (ref 1–32)
BASOPHILS # BLD AUTO: 0.07 10*3/MM3 (ref 0–0.2)
BASOPHILS NFR BLD AUTO: 1.1 % (ref 0–1.5)
BILIRUB SERPL-MCNC: 0.6 MG/DL (ref 0.2–1.2)
BILIRUB UR QL STRIP: NEGATIVE
BUN BLD-MCNC: 8 MG/DL (ref 8–23)
BUN/CREAT SERPL: 10.7 (ref 7–25)
CALCIUM SPEC-SCNC: 10.1 MG/DL (ref 8.6–10.5)
CHLORIDE SERPL-SCNC: 84 MMOL/L (ref 98–107)
CLARITY UR: CLEAR
CO2 SERPL-SCNC: 24.7 MMOL/L (ref 22–29)
COLOR UR: YELLOW
CREAT BLD-MCNC: 0.75 MG/DL (ref 0.57–1)
DEPRECATED RDW RBC AUTO: 41 FL (ref 37–54)
EOSINOPHIL # BLD AUTO: 0.09 10*3/MM3 (ref 0–0.4)
EOSINOPHIL NFR BLD AUTO: 1.5 % (ref 0.3–6.2)
ERYTHROCYTE [DISTWIDTH] IN BLOOD BY AUTOMATED COUNT: 13.3 % (ref 12.3–15.4)
GFR SERPL CREATININE-BSD FRML MDRD: 76 ML/MIN/1.73
GLOBULIN UR ELPH-MCNC: 2.9 GM/DL
GLUCOSE BLD-MCNC: 144 MG/DL (ref 65–99)
GLUCOSE UR STRIP-MCNC: NEGATIVE MG/DL
HCT VFR BLD AUTO: 36.3 % (ref 34–46.6)
HGB BLD-MCNC: 12.4 G/DL (ref 12–15.9)
HGB UR QL STRIP.AUTO: NEGATIVE
IMM GRANULOCYTES # BLD AUTO: 0.02 10*3/MM3 (ref 0–0.05)
IMM GRANULOCYTES NFR BLD AUTO: 0.3 % (ref 0–0.5)
KETONES UR QL STRIP: NEGATIVE
LEUKOCYTE ESTERASE UR QL STRIP.AUTO: NEGATIVE
LYMPHOCYTES # BLD AUTO: 2.42 10*3/MM3 (ref 0.7–3.1)
LYMPHOCYTES NFR BLD AUTO: 39.1 % (ref 19.6–45.3)
MCH RBC QN AUTO: 28.4 PG (ref 26.6–33)
MCHC RBC AUTO-ENTMCNC: 34.2 G/DL (ref 31.5–35.7)
MCV RBC AUTO: 83.3 FL (ref 79–97)
MONOCYTES # BLD AUTO: 0.87 10*3/MM3 (ref 0.1–0.9)
MONOCYTES NFR BLD AUTO: 14.1 % (ref 5–12)
NEUTROPHILS # BLD AUTO: 2.72 10*3/MM3 (ref 1.7–7)
NEUTROPHILS NFR BLD AUTO: 43.9 % (ref 42.7–76)
NITRITE UR QL STRIP: NEGATIVE
NRBC BLD AUTO-RTO: 0 /100 WBC (ref 0–0.2)
PH UR STRIP.AUTO: >=9 [PH] (ref 5–8)
PLATELET # BLD AUTO: 274 10*3/MM3 (ref 140–450)
PMV BLD AUTO: 10.3 FL (ref 6–12)
POTASSIUM BLD-SCNC: 2.9 MMOL/L (ref 3.5–5.2)
PROT SERPL-MCNC: 7 G/DL (ref 6–8.5)
PROT UR QL STRIP: NEGATIVE
RBC # BLD AUTO: 4.36 10*6/MM3 (ref 3.77–5.28)
SODIUM BLD-SCNC: 123 MMOL/L (ref 136–145)
SP GR UR STRIP: 1.01 (ref 1–1.03)
UROBILINOGEN UR QL STRIP: ABNORMAL
WBC NRBC COR # BLD: 6.19 10*3/MM3 (ref 3.4–10.8)

## 2020-02-20 PROCEDURE — 81003 URINALYSIS AUTO W/O SCOPE: CPT | Performed by: STUDENT IN AN ORGANIZED HEALTH CARE EDUCATION/TRAINING PROGRAM

## 2020-02-20 PROCEDURE — 25010000002 LORAZEPAM PER 2 MG: Performed by: STUDENT IN AN ORGANIZED HEALTH CARE EDUCATION/TRAINING PROGRAM

## 2020-02-20 PROCEDURE — 96374 THER/PROPH/DIAG INJ IV PUSH: CPT

## 2020-02-20 PROCEDURE — 99284 EMERGENCY DEPT VISIT MOD MDM: CPT

## 2020-02-20 PROCEDURE — 25010000002 ONDANSETRON PER 1 MG: Performed by: STUDENT IN AN ORGANIZED HEALTH CARE EDUCATION/TRAINING PROGRAM

## 2020-02-20 PROCEDURE — 93005 ELECTROCARDIOGRAM TRACING: CPT | Performed by: STUDENT IN AN ORGANIZED HEALTH CARE EDUCATION/TRAINING PROGRAM

## 2020-02-20 PROCEDURE — 25010000002 PROMETHAZINE PER 50 MG: Performed by: STUDENT IN AN ORGANIZED HEALTH CARE EDUCATION/TRAINING PROGRAM

## 2020-02-20 PROCEDURE — 70450 CT HEAD/BRAIN W/O DYE: CPT

## 2020-02-20 PROCEDURE — 80053 COMPREHEN METABOLIC PANEL: CPT | Performed by: STUDENT IN AN ORGANIZED HEALTH CARE EDUCATION/TRAINING PROGRAM

## 2020-02-20 PROCEDURE — 85025 COMPLETE CBC W/AUTO DIFF WBC: CPT | Performed by: STUDENT IN AN ORGANIZED HEALTH CARE EDUCATION/TRAINING PROGRAM

## 2020-02-20 PROCEDURE — 25010000002 MORPHINE PER 10 MG: Performed by: STUDENT IN AN ORGANIZED HEALTH CARE EDUCATION/TRAINING PROGRAM

## 2020-02-20 PROCEDURE — 96375 TX/PRO/DX INJ NEW DRUG ADDON: CPT

## 2020-02-20 RX ORDER — PROMETHAZINE HYDROCHLORIDE 25 MG/1
25 TABLET ORAL EVERY 6 HOURS PRN
Qty: 20 TABLET | Refills: 0 | Status: SHIPPED | OUTPATIENT
Start: 2020-02-20 | End: 2020-06-21

## 2020-02-20 RX ORDER — POTASSIUM CHLORIDE 750 MG/1
40 CAPSULE, EXTENDED RELEASE ORAL ONCE
Status: COMPLETED | OUTPATIENT
Start: 2020-02-20 | End: 2020-02-20

## 2020-02-20 RX ORDER — LORAZEPAM 2 MG/ML
0.5 INJECTION INTRAMUSCULAR ONCE
Status: COMPLETED | OUTPATIENT
Start: 2020-02-20 | End: 2020-02-20

## 2020-02-20 RX ORDER — METOPROLOL TARTRATE 50 MG/1
50 TABLET, FILM COATED ORAL 2 TIMES DAILY
COMMUNITY
End: 2022-09-16 | Stop reason: HOSPADM

## 2020-02-20 RX ORDER — ONDANSETRON 2 MG/ML
8 INJECTION INTRAMUSCULAR; INTRAVENOUS ONCE
Status: COMPLETED | OUTPATIENT
Start: 2020-02-20 | End: 2020-02-20

## 2020-02-20 RX ORDER — MAGNESIUM CHLORIDE 64 MG
TABLET, DELAYED RELEASE (ENTERIC COATED) ORAL DAILY
COMMUNITY
End: 2020-06-21

## 2020-02-20 RX ORDER — PROMETHAZINE HYDROCHLORIDE 25 MG/ML
6.25 INJECTION, SOLUTION INTRAMUSCULAR; INTRAVENOUS ONCE
Status: COMPLETED | OUTPATIENT
Start: 2020-02-20 | End: 2020-02-20

## 2020-02-20 RX ORDER — CLONIDINE HYDROCHLORIDE 0.2 MG/1
0.2 TABLET ORAL ONCE
Status: DISCONTINUED | OUTPATIENT
Start: 2020-02-20 | End: 2020-02-20 | Stop reason: HOSPADM

## 2020-02-20 RX ORDER — LOSARTAN POTASSIUM AND HYDROCHLOROTHIAZIDE 12.5; 5 MG/1; MG/1
1 TABLET ORAL DAILY
COMMUNITY
End: 2020-06-21

## 2020-02-20 RX ORDER — MORPHINE SULFATE 2 MG/ML
2 INJECTION, SOLUTION INTRAMUSCULAR; INTRAVENOUS ONCE
Status: COMPLETED | OUTPATIENT
Start: 2020-02-20 | End: 2020-02-20

## 2020-02-20 RX ORDER — POTASSIUM CHLORIDE 750 MG/1
20 CAPSULE, EXTENDED RELEASE ORAL DAILY
COMMUNITY
End: 2022-09-16 | Stop reason: HOSPADM

## 2020-02-20 RX ADMIN — POTASSIUM CHLORIDE 40 MEQ: 750 CAPSULE, EXTENDED RELEASE ORAL at 04:32

## 2020-02-20 RX ADMIN — LORAZEPAM 0.5 MG: 2 INJECTION, SOLUTION INTRAMUSCULAR; INTRAVENOUS at 04:20

## 2020-02-20 RX ADMIN — ONDANSETRON 8 MG: 2 INJECTION INTRAMUSCULAR; INTRAVENOUS at 02:47

## 2020-02-20 RX ADMIN — MORPHINE SULFATE 2 MG: 2 INJECTION, SOLUTION INTRAMUSCULAR; INTRAVENOUS at 03:57

## 2020-02-20 RX ADMIN — PROMETHAZINE HYDROCHLORIDE 6.25 MG: 25 INJECTION INTRAMUSCULAR; INTRAVENOUS at 04:24

## 2020-02-20 RX ADMIN — SODIUM CHLORIDE 1000 ML: 9 INJECTION, SOLUTION INTRAVENOUS at 02:46

## 2020-02-20 NOTE — ED PROVIDER NOTES
Subjective   Patient is a 74-year-old female who presents to the emergency department with a headache, nausea, and vomiting.  Patient states that her doctor prescribed Lexapro for her.  She took this at 9 PM when she went to bed day before yesterday.  She woke up around 1 that morning with nausea and vomiting and has been sick to her stomach ever since.  The patient does take blood pressure medications and she has tried to take her medications today.  She has had symptoms for almost 24 hours.  She is unsure if she has been throwing up anything.   states she is been able to hold any solid food down.  Patient states that she has a severe, constant, throbbing headache that nothing makes better or worse.  She denies chest pain or shortness of breath.  She denies vision changes.          Review of Systems   All other systems reviewed and are negative.      Past Medical History:   Diagnosis Date   • Benign hypertension    • Family history of heart disease    • Family history of heart disease    • Hypercholesterolemia    • Rheumatoid arthritis (CMS/HCC)    • Rheumatoid arthritis (CMS/HCC)    • Supraventricular tachycardia (CMS/HCC)     first diagnosed 06/01/2012   • Supraventricular tachycardia (CMS/HCC)        No Known Allergies    History reviewed. No pertinent surgical history.    Family History   Problem Relation Age of Onset   • Heart disease Other    • Hypertension Sister        Social History     Socioeconomic History   • Marital status:      Spouse name: Not on file   • Number of children: Not on file   • Years of education: Not on file   • Highest education level: Not on file   Tobacco Use   • Smoking status: Never Smoker   Substance and Sexual Activity   • Alcohol use: No   • Drug use: No   • Sexual activity: Defer           Objective   Physical Exam   Nursing note and vitals reviewed.      GEN: Looks like she does not feel well, has a wash rag on her forehead, but is nontoxic  Head: Normocephalic,  atraumatic  Eyes: Pupils equal round reactive to light  ENT: Posterior pharynx normal in appearance, oral mucosa is moist  Chest: Nontender to palpation  Cardiovascular: Tachycardic in the low 100s  Lungs: Clear to auscultation bilaterally  Abdomen: Soft, nontender, nondistended, no peritoneal signs  Extremities: No edema, normal appearance  Neuro: GCS 15, moves all 4 extremities in a coordinated manner  Psych: Mood and affect are appropriate        Procedures           ED Course  ED Course as of Feb 20 0459   Thu Feb 20, 2020   0346 EKG shows a sinus rhythm with a rate of 81.  Nonspecific T waves.  Abnormal EKG.  Interpreted by me.    [DT]      ED Course User Index  [DT] Miguel A Dimas MD                                           MDM  Number of Diagnoses or Management Options  Acute nonintractable headache, unspecified headache type:   Adverse drug interaction with prescription medication:   Non-intractable vomiting with nausea, unspecified vomiting type:   Diagnosis management comments: Head CT shows no acute intracranial pathology  Patient is laboratories do show a mildly low potassium as well as sodium.  Did give the patient IV fluids along with potassium orally.  Patient was treated initially with Zofran IV.  This helped but she stated she was still mildly nauseous so she was given Phenergan as well as Ativan.  After reviewing the patient's chart it does appear that she uses Ativan for anxiety daily and I thought this may help if she has been unable to take this.  Patient's blood pressure did normalize.  Did advise against further taking Lexapro.  Did give some Phenergan for home use.  Recommended follow-up with her primary care physician for further treatment options       Amount and/or Complexity of Data Reviewed  Clinical lab tests: reviewed  Decide to obtain previous medical records or to obtain history from someone other than the patient: yes  Obtain history from someone other than the patient:  yes  Review and summarize past medical records: yes  Independent visualization of images, tracings, or specimens: yes        Final diagnoses:   Non-intractable vomiting with nausea, unspecified vomiting type   Acute nonintractable headache, unspecified headache type   Adverse drug interaction with prescription medication            Miguel A Dimas MD  02/20/20 1943

## 2020-02-20 NOTE — DISCHARGE INSTRUCTIONS
Do not take anymore Lexapro.  Please follow-up with your doctor at the first available appointment for further treatment options

## 2020-02-24 ENCOUNTER — HOSPITAL ENCOUNTER (OUTPATIENT)
Dept: MRI IMAGING | Facility: HOSPITAL | Age: 75
Discharge: HOME | End: 2020-02-24
Admitting: INTERNAL MEDICINE

## 2020-02-24 DIAGNOSIS — M54.50 LOW BACK PAIN, UNSPECIFIED BACK PAIN LATERALITY, UNSPECIFIED CHRONICITY, UNSPECIFIED WHETHER SCIATICA PRESENT: ICD-10-CM

## 2020-02-24 PROCEDURE — 72148 MRI LUMBAR SPINE W/O DYE: CPT

## 2020-02-27 ENCOUNTER — TRANSCRIBE ORDERS (OUTPATIENT)
Dept: CT IMAGING | Facility: HOSPITAL | Age: 75
End: 2020-02-27

## 2020-02-27 DIAGNOSIS — N28.9 RENAL LESION: Primary | ICD-10-CM

## 2020-03-18 ENCOUNTER — APPOINTMENT (OUTPATIENT)
Dept: CT IMAGING | Facility: HOSPITAL | Age: 75
End: 2020-03-18

## 2020-05-13 ENCOUNTER — HOSPITAL ENCOUNTER (OUTPATIENT)
Dept: CT IMAGING | Facility: HOSPITAL | Age: 75
Discharge: HOME | End: 2020-05-13
Admitting: INTERNAL MEDICINE

## 2020-05-13 DIAGNOSIS — N28.9 RENAL LESION: ICD-10-CM

## 2020-05-13 LAB — CREAT BLDA-MCNC: 0.8 MG/DL (ref 0.6–1.3)

## 2020-05-13 PROCEDURE — 74170 CT ABD WO CNTRST FLWD CNTRST: CPT

## 2020-05-13 PROCEDURE — 82565 ASSAY OF CREATININE: CPT

## 2020-05-13 PROCEDURE — 25010000002 IOPAMIDOL 61 % SOLUTION: Performed by: INTERNAL MEDICINE

## 2020-05-13 RX ADMIN — IOPAMIDOL 100 ML: 612 INJECTION, SOLUTION INTRAVENOUS at 10:46

## 2020-05-26 ENCOUNTER — APPOINTMENT (OUTPATIENT)
Dept: CT IMAGING | Facility: HOSPITAL | Age: 75
End: 2020-05-26

## 2020-06-07 PROBLEM — Z01.419 WELL WOMAN EXAM: Status: ACTIVE | Noted: 2020-06-07

## 2020-06-21 ENCOUNTER — HOSPITAL ENCOUNTER (EMERGENCY)
Facility: HOSPITAL | Age: 75
Discharge: HOME | End: 2020-06-21
Attending: EMERGENCY MEDICINE | Admitting: EMERGENCY MEDICINE

## 2020-06-21 ENCOUNTER — APPOINTMENT (OUTPATIENT)
Dept: GENERAL RADIOLOGY | Facility: HOSPITAL | Age: 75
End: 2020-06-21

## 2020-06-21 VITALS
BODY MASS INDEX: 27.6 KG/M2 | TEMPERATURE: 99.3 F | HEART RATE: 72 BPM | HEIGHT: 62 IN | OXYGEN SATURATION: 98 % | SYSTOLIC BLOOD PRESSURE: 154 MMHG | RESPIRATION RATE: 18 BRPM | DIASTOLIC BLOOD PRESSURE: 75 MMHG | WEIGHT: 150 LBS

## 2020-06-21 DIAGNOSIS — R07.9 CHEST PAIN, UNSPECIFIED TYPE: ICD-10-CM

## 2020-06-21 DIAGNOSIS — I10 ELEVATED BLOOD PRESSURE READING WITH DIAGNOSIS OF HYPERTENSION: Primary | ICD-10-CM

## 2020-06-21 LAB
ALBUMIN SERPL-MCNC: 3.9 G/DL (ref 3.5–5.2)
ALBUMIN/GLOB SERPL: 1 G/DL
ALP SERPL-CCNC: 73 U/L (ref 39–117)
ALT SERPL W P-5'-P-CCNC: 17 U/L (ref 1–33)
ANION GAP SERPL CALCULATED.3IONS-SCNC: 15.1 MMOL/L (ref 5–15)
AST SERPL-CCNC: 31 U/L (ref 1–32)
BASOPHILS # BLD AUTO: 0.1 10*3/MM3 (ref 0–0.2)
BASOPHILS NFR BLD AUTO: 0.7 % (ref 0–1.5)
BILIRUB SERPL-MCNC: 0.3 MG/DL (ref 0.2–1.2)
BUN BLD-MCNC: 6 MG/DL (ref 8–23)
BUN/CREAT SERPL: 9.1 (ref 7–25)
CALCIUM SPEC-SCNC: 10 MG/DL (ref 8.6–10.5)
CHLORIDE SERPL-SCNC: 89 MMOL/L (ref 98–107)
CO2 SERPL-SCNC: 25.9 MMOL/L (ref 22–29)
CREAT BLD-MCNC: 0.66 MG/DL (ref 0.57–1)
DEPRECATED RDW RBC AUTO: 37.8 FL (ref 37–54)
EOSINOPHIL # BLD AUTO: 0.26 10*3/MM3 (ref 0–0.4)
EOSINOPHIL NFR BLD AUTO: 1.9 % (ref 0.3–6.2)
ERYTHROCYTE [DISTWIDTH] IN BLOOD BY AUTOMATED COUNT: 12.4 % (ref 12.3–15.4)
GFR SERPL CREATININE-BSD FRML MDRD: 88 ML/MIN/1.73
GLOBULIN UR ELPH-MCNC: 3.8 GM/DL
GLUCOSE BLD-MCNC: 106 MG/DL (ref 65–99)
HCT VFR BLD AUTO: 38.6 % (ref 34–46.6)
HGB BLD-MCNC: 13 G/DL (ref 12–15.9)
HOLD SPECIMEN: NORMAL
HOLD SPECIMEN: NORMAL
IMM GRANULOCYTES # BLD AUTO: 0.06 10*3/MM3 (ref 0–0.05)
IMM GRANULOCYTES NFR BLD AUTO: 0.4 % (ref 0–0.5)
LYMPHOCYTES # BLD AUTO: 2.35 10*3/MM3 (ref 0.7–3.1)
LYMPHOCYTES NFR BLD AUTO: 16.9 % (ref 19.6–45.3)
MCH RBC QN AUTO: 28.2 PG (ref 26.6–33)
MCHC RBC AUTO-ENTMCNC: 33.7 G/DL (ref 31.5–35.7)
MCV RBC AUTO: 83.7 FL (ref 79–97)
MONOCYTES # BLD AUTO: 1.3 10*3/MM3 (ref 0.1–0.9)
MONOCYTES NFR BLD AUTO: 9.3 % (ref 5–12)
NEUTROPHILS # BLD AUTO: 9.86 10*3/MM3 (ref 1.7–7)
NEUTROPHILS NFR BLD AUTO: 70.8 % (ref 42.7–76)
NRBC BLD AUTO-RTO: 0 /100 WBC (ref 0–0.2)
PLATELET # BLD AUTO: 247 10*3/MM3 (ref 140–450)
PMV BLD AUTO: 11 FL (ref 6–12)
POTASSIUM BLD-SCNC: 3.6 MMOL/L (ref 3.5–5.2)
PROT SERPL-MCNC: 7.7 G/DL (ref 6–8.5)
RBC # BLD AUTO: 4.61 10*6/MM3 (ref 3.77–5.28)
SODIUM BLD-SCNC: 130 MMOL/L (ref 136–145)
TROPONIN T SERPL-MCNC: <0.01 NG/ML (ref 0–0.03)
TROPONIN T SERPL-MCNC: <0.01 NG/ML (ref 0–0.03)
WBC NRBC COR # BLD: 13.93 10*3/MM3 (ref 3.4–10.8)
WHOLE BLOOD HOLD SPECIMEN: NORMAL
WHOLE BLOOD HOLD SPECIMEN: NORMAL

## 2020-06-21 PROCEDURE — 71045 X-RAY EXAM CHEST 1 VIEW: CPT

## 2020-06-21 PROCEDURE — 80053 COMPREHEN METABOLIC PANEL: CPT

## 2020-06-21 PROCEDURE — 85025 COMPLETE CBC W/AUTO DIFF WBC: CPT

## 2020-06-21 PROCEDURE — 84484 ASSAY OF TROPONIN QUANT: CPT

## 2020-06-21 PROCEDURE — 93005 ELECTROCARDIOGRAM TRACING: CPT

## 2020-06-21 PROCEDURE — 99283 EMERGENCY DEPT VISIT LOW MDM: CPT

## 2020-06-21 PROCEDURE — 84484 ASSAY OF TROPONIN QUANT: CPT | Performed by: EMERGENCY MEDICINE

## 2020-06-21 RX ORDER — AMLODIPINE BESYLATE 5 MG/1
5 TABLET ORAL 2 TIMES DAILY
COMMUNITY
End: 2022-09-16 | Stop reason: HOSPADM

## 2020-06-21 RX ORDER — MELATONIN
2000 DAILY
COMMUNITY

## 2020-06-21 RX ORDER — TRAMADOL HYDROCHLORIDE 50 MG/1
50 TABLET ORAL EVERY 6 HOURS PRN
COMMUNITY
End: 2022-09-16 | Stop reason: HOSPADM

## 2020-06-21 RX ORDER — SODIUM CHLORIDE 0.9 % (FLUSH) 0.9 %
10 SYRINGE (ML) INJECTION AS NEEDED
Status: DISCONTINUED | OUTPATIENT
Start: 2020-06-21 | End: 2020-06-21 | Stop reason: HOSPADM

## 2020-06-21 RX ORDER — HYDROCHLOROTHIAZIDE 25 MG/1
25 TABLET ORAL DAILY
COMMUNITY
End: 2022-09-16 | Stop reason: HOSPADM

## 2020-06-21 NOTE — DISCHARGE INSTRUCTIONS
You can take an additional Norvasc as needed once daily in addition to her nighttime dose if her blood pressure continues to be elevated.  Keep a blood pressure diary for your doctor.

## 2020-06-23 NOTE — ED PROVIDER NOTES
Subjective   History of Present Illness    Chief Complaint: Elevated blood pressure readings, felt nervous in her chest  History of Present Illness: 75-year-old female presents with nervous feeling in her chest, she was concerned that it could be a cardiac issue due to history of SVT.  When she checked her blood pressure at that time her blood pressure was elevated at 200 systolic.  Does have a remote history of multiple episodes of SVT.  Given her prior history she comes to ER for further evaluation.  Is on 3 medications for blood pressure currently recently started hydrochlorothiazide.  Onset: Yesterday with elevated blood pressure today  Duration: Persistent now resolved  Exacerbating / Alleviating factors: Better with her blood pressure medication  Associated symptoms: None      Nurses Notes reviewed and agree, including vitals, allergies, social history and prior medical history.     REVIEW OF SYSTEMS: All systems reviewed and not pertinent unless noted.    Positive for: Nervous feeling inside her chest, elevated blood pressure readings at home    Negative for: Palp rotations, syncope, cough, hemoptysis, abdominal pain, back pain, fever, shortness of breath, known sick contacts, travel  Review of Systems    Past Medical History:   Diagnosis Date   • Benign hypertension    • Disease of thyroid gland    • Family history of heart disease    • Family history of heart disease    • Hypercholesterolemia    • Rheumatoid arthritis (CMS/HCC)    • Rheumatoid arthritis (CMS/HCC)    • Supraventricular tachycardia (CMS/HCC)     first diagnosed 06/01/2012   • Supraventricular tachycardia (CMS/HCC)        No Known Allergies    History reviewed. No pertinent surgical history.    Family History   Problem Relation Age of Onset   • Heart disease Other    • Hypertension Sister        Social History     Socioeconomic History   • Marital status:      Spouse name: Not on file   • Number of children: Not on file   • Years of  education: Not on file   • Highest education level: Not on file   Tobacco Use   • Smoking status: Never Smoker   Substance and Sexual Activity   • Alcohol use: No   • Drug use: No   • Sexual activity: Defer           Objective   Physical Exam      GENERAL APPEARANCE: Well developed, well nourished, pleasant 75-year-old white female in no acute distress.  VITAL SIGNS: per nursing, reviewed and noted  SKIN: no rashes, ulcerations or petechiae.  Head: Normocephalic, atraumatic.   EYES: perrla. EOMI.  ENT:  TM clear, normal voice.  Patient maintained wearing mask throughout the patient encounter  LUNGS:  normal breath sounds. No retractions.   CARDIOVASCULAR:  regular rate and rhythm, no murmurs.  Good Peripheral pulses.  ABDOMEN: Soft, nontender, normal bowel sounds. No hernia. No ascites.  MUSCULOSKELETAL:  No tenderness. Full ROM. Strength and tone normal.  NEUROLOGIC: Alert, oriented x 3. No gross deficits.   NECK: Supple, symmetric. No tenderness, no masses. Full ROM  Back: full rom, no paraspinal spasm. No CVA tenderness.   PSYCH: appropriate affect,   : no bladder tenderness or distention, no CVA tenderness             ED Course  ED Course as of Jun 22 2046   Sun Jun 21, 2020   1010 Troponin T: <0.010 [PF]   1010 ALT (SGPT): 17 [PF]   1010 AST (SGOT): 31 [PF]   1010 Alkaline Phosphatase: 73 [PF]   1010 Total Bilirubin: 0.3 [PF]   1010 Glucose(!): 106 [PF]   1010 BUN(!): 6 [PF]   1010 Creatinine: 0.66 [PF]   1010 Sodium(!): 130 [PF]   1010 Potassium: 3.6 [PF]   1010 Chloride(!): 89 [PF]   1010 CO2: 25.9 [PF]   1010 Calcium: 10.0 [PF]   1010 Total Protein: 7.7 [PF]   1010 Albumin: 3.90 [PF]   1010 WBC(!): 13.93 [PF]   1010 Hemoglobin: 13.0 [PF]   1010 Hematocrit: 38.6 [PF]   1011 Platelets: 247 [PF]   1011 COMPARISON: 2019.     FINDINGS: Mildly elevated left hemidiaphragm. Normal heart size. No  convincing pneumonia or edema. No effusion or pneumothorax. No acute  fracture. Scoliosis.         IMPRESSION:  No  acute cardiopulmonary process .           This report was finalized on 6/21/2020 9:25 AM by Dr Alex Echavarria DO.    [PF]   1139 EKG interpreted by me reveals sinus rhythm rate 69.  No ectopy no ischemic change.  Normal EKG.    [PF]   1206 Troponin T: <0.010 [PF]      ED Course User Index  [PF] Jaiden Gallo,                                            MDM    Reassuring cardiac work-up with troponins negative x2.  No evidence of ischemic changes on EKG.  Chest x-ray without acute findings..  Discharged home in stable condition with outpatient follow-up recommendations and return precautions discussed.  Patient monitored in ER.  Blood pressure improved spontaneously.   Advised patient can increase her Norvasc from 5 mg daily to 10 mg daily if she has persistent elevations in blood pressure.  Advised to continue her current dose of beta-blocker as her heart rate is in the 70s, continue her current dose of hydrochlorothiazide.  Final diagnoses:   Elevated blood pressure reading with diagnosis of hypertension   Chest pain, unspecified type            Jaiden Gallo DO  06/22/20 2046

## 2020-07-07 ENCOUNTER — TRANSCRIBE ORDERS (OUTPATIENT)
Dept: LAB | Facility: HOSPITAL | Age: 75
End: 2020-07-07

## 2020-07-07 ENCOUNTER — LAB (OUTPATIENT)
Dept: LAB | Facility: HOSPITAL | Age: 75
End: 2020-07-07

## 2020-07-07 DIAGNOSIS — Z01.818 PRE-OP TESTING: ICD-10-CM

## 2020-07-07 DIAGNOSIS — Z01.818 PRE-OP TESTING: Primary | ICD-10-CM

## 2020-07-07 PROCEDURE — C9803 HOPD COVID-19 SPEC COLLECT: HCPCS

## 2020-07-07 PROCEDURE — U0002 COVID-19 LAB TEST NON-CDC: HCPCS

## 2020-07-07 PROCEDURE — U0004 COV-19 TEST NON-CDC HGH THRU: HCPCS

## 2020-07-09 ENCOUNTER — TRANSCRIBE ORDERS (OUTPATIENT)
Dept: ADMINISTRATIVE | Facility: HOSPITAL | Age: 75
End: 2020-07-09

## 2020-07-09 DIAGNOSIS — R94.39 ABNORMAL STRESS TEST: Primary | ICD-10-CM

## 2020-07-09 LAB
REF LAB TEST METHOD: NORMAL
SARS-COV-2 RNA RESP QL NAA+PROBE: NOT DETECTED

## 2020-07-10 ENCOUNTER — HOSPITAL ENCOUNTER (OUTPATIENT)
Facility: HOSPITAL | Age: 75
Setting detail: HOSPITAL OUTPATIENT SURGERY
Discharge: HOME | End: 2020-07-10
Attending: INTERNAL MEDICINE | Admitting: INTERNAL MEDICINE

## 2020-07-10 VITALS
SYSTOLIC BLOOD PRESSURE: 112 MMHG | RESPIRATION RATE: 18 BRPM | OXYGEN SATURATION: 97 % | DIASTOLIC BLOOD PRESSURE: 69 MMHG | HEIGHT: 63 IN | HEART RATE: 63 BPM | WEIGHT: 148.81 LBS | BODY MASS INDEX: 26.37 KG/M2 | TEMPERATURE: 97.5 F

## 2020-07-10 DIAGNOSIS — I47.1 SUPRAVENTRICULAR TACHYCARDIA (HCC): Primary | ICD-10-CM

## 2020-07-10 DIAGNOSIS — R94.39 ABNORMAL STRESS TEST: ICD-10-CM

## 2020-07-10 LAB
ANION GAP SERPL CALCULATED.3IONS-SCNC: 11 MMOL/L (ref 5–15)
BUN SERPL-MCNC: 10 MG/DL (ref 8–23)
BUN/CREAT SERPL: 14.1 (ref 7–25)
CALCIUM SPEC-SCNC: 9.7 MG/DL (ref 8.6–10.5)
CHLORIDE SERPL-SCNC: 91 MMOL/L (ref 98–107)
CO2 SERPL-SCNC: 29 MMOL/L (ref 22–29)
CREAT SERPL-MCNC: 0.71 MG/DL (ref 0.57–1)
DEPRECATED RDW RBC AUTO: 41.8 FL (ref 37–54)
ERYTHROCYTE [DISTWIDTH] IN BLOOD BY AUTOMATED COUNT: 13.3 % (ref 12.3–15.4)
GFR SERPL CREATININE-BSD FRML MDRD: 81 ML/MIN/1.73
GLUCOSE SERPL-MCNC: 96 MG/DL (ref 65–99)
HCT VFR BLD AUTO: 38.7 % (ref 34–46.6)
HGB BLD-MCNC: 12.3 G/DL (ref 12–15.9)
MCH RBC QN AUTO: 27.2 PG (ref 26.6–33)
MCHC RBC AUTO-ENTMCNC: 31.8 G/DL (ref 31.5–35.7)
MCV RBC AUTO: 85.6 FL (ref 79–97)
PLATELET # BLD AUTO: 233 10*3/MM3 (ref 140–450)
PMV BLD AUTO: 10.8 FL (ref 6–12)
POTASSIUM SERPL-SCNC: 3 MMOL/L (ref 3.5–5.2)
RBC # BLD AUTO: 4.52 10*6/MM3 (ref 3.77–5.28)
SODIUM SERPL-SCNC: 131 MMOL/L (ref 136–145)
WBC # BLD AUTO: 7.82 10*3/MM3 (ref 3.4–10.8)

## 2020-07-10 PROCEDURE — C1769 GUIDE WIRE: HCPCS | Performed by: INTERNAL MEDICINE

## 2020-07-10 PROCEDURE — 25010000002 MIDAZOLAM PER 1 MG: Performed by: INTERNAL MEDICINE

## 2020-07-10 PROCEDURE — C1725 CATH, TRANSLUMIN NON-LASER: HCPCS | Performed by: INTERNAL MEDICINE

## 2020-07-10 PROCEDURE — 36415 COLL VENOUS BLD VENIPUNCTURE: CPT

## 2020-07-10 PROCEDURE — 93005 ELECTROCARDIOGRAM TRACING: CPT | Performed by: INTERNAL MEDICINE

## 2020-07-10 PROCEDURE — 93458 L HRT ARTERY/VENTRICLE ANGIO: CPT | Performed by: INTERNAL MEDICINE

## 2020-07-10 PROCEDURE — C1894 INTRO/SHEATH, NON-LASER: HCPCS | Performed by: INTERNAL MEDICINE

## 2020-07-10 PROCEDURE — 25010000002 BIVALIRUDIN TRIFLUOROACETATE 250 MG RECONSTITUTED SOLUTION 1 EACH VIAL: Performed by: INTERNAL MEDICINE

## 2020-07-10 PROCEDURE — 25010000003 LIDOCAINE 1 % SOLUTION: Performed by: INTERNAL MEDICINE

## 2020-07-10 PROCEDURE — 0 IOPAMIDOL PER 1 ML: Performed by: INTERNAL MEDICINE

## 2020-07-10 PROCEDURE — 25010000002 FENTANYL CITRATE (PF) 100 MCG/2ML SOLUTION: Performed by: INTERNAL MEDICINE

## 2020-07-10 PROCEDURE — 85027 COMPLETE CBC AUTOMATED: CPT | Performed by: INTERNAL MEDICINE

## 2020-07-10 PROCEDURE — 25010000002 HEPARIN (PORCINE) PER 1000 UNITS: Performed by: INTERNAL MEDICINE

## 2020-07-10 PROCEDURE — C1887 CATHETER, GUIDING: HCPCS | Performed by: INTERNAL MEDICINE

## 2020-07-10 PROCEDURE — 80048 BASIC METABOLIC PNL TOTAL CA: CPT | Performed by: INTERNAL MEDICINE

## 2020-07-10 PROCEDURE — C9600 PERC DRUG-EL COR STENT SING: HCPCS | Performed by: INTERNAL MEDICINE

## 2020-07-10 PROCEDURE — C1874 STENT, COATED/COV W/DEL SYS: HCPCS | Performed by: INTERNAL MEDICINE

## 2020-07-10 DEVICE — XIENCE SIERRA™ EVEROLIMUS ELUTING CORONARY STENT SYSTEM 2.50 MM X 18 MM / RAPID-EXCHANGE
Type: IMPLANTABLE DEVICE | Status: FUNCTIONAL
Brand: XIENCE SIERRA™

## 2020-07-10 RX ORDER — MORPHINE SULFATE 2 MG/ML
1 INJECTION, SOLUTION INTRAMUSCULAR; INTRAVENOUS EVERY 4 HOURS PRN
Status: DISCONTINUED | OUTPATIENT
Start: 2020-07-10 | End: 2020-07-10 | Stop reason: HOSPADM

## 2020-07-10 RX ORDER — MIDAZOLAM HYDROCHLORIDE 1 MG/ML
INJECTION INTRAMUSCULAR; INTRAVENOUS AS NEEDED
Status: DISCONTINUED | OUTPATIENT
Start: 2020-07-10 | End: 2020-07-10 | Stop reason: HOSPADM

## 2020-07-10 RX ORDER — LIDOCAINE HYDROCHLORIDE 10 MG/ML
INJECTION, SOLUTION INFILTRATION; PERINEURAL AS NEEDED
Status: DISCONTINUED | OUTPATIENT
Start: 2020-07-10 | End: 2020-07-10 | Stop reason: HOSPADM

## 2020-07-10 RX ORDER — HYDROCODONE BITARTRATE AND ACETAMINOPHEN 5; 325 MG/1; MG/1
1 TABLET ORAL EVERY 4 HOURS PRN
Status: DISCONTINUED | OUTPATIENT
Start: 2020-07-10 | End: 2020-07-10 | Stop reason: HOSPADM

## 2020-07-10 RX ORDER — TEMAZEPAM 7.5 MG/1
7.5 CAPSULE ORAL NIGHTLY PRN
Status: DISCONTINUED | OUTPATIENT
Start: 2020-07-10 | End: 2020-07-10 | Stop reason: HOSPADM

## 2020-07-10 RX ORDER — ASPIRIN 81 MG/1
81 TABLET ORAL DAILY
COMMUNITY
End: 2022-09-16 | Stop reason: HOSPADM

## 2020-07-10 RX ORDER — FENTANYL CITRATE 50 UG/ML
INJECTION, SOLUTION INTRAMUSCULAR; INTRAVENOUS AS NEEDED
Status: DISCONTINUED | OUTPATIENT
Start: 2020-07-10 | End: 2020-07-10 | Stop reason: HOSPADM

## 2020-07-10 RX ORDER — SODIUM CHLORIDE 9 MG/ML
250 INJECTION, SOLUTION INTRAVENOUS CONTINUOUS
Status: ACTIVE | OUTPATIENT
Start: 2020-07-10 | End: 2020-07-10

## 2020-07-10 RX ORDER — ACETAMINOPHEN 325 MG/1
650 TABLET ORAL EVERY 4 HOURS PRN
Status: DISCONTINUED | OUTPATIENT
Start: 2020-07-10 | End: 2020-07-10 | Stop reason: HOSPADM

## 2020-07-10 RX ORDER — POTASSIUM CHLORIDE 1.5 G/1.77G
40 POWDER, FOR SOLUTION ORAL AS NEEDED
Status: DISCONTINUED | OUTPATIENT
Start: 2020-07-10 | End: 2020-07-10 | Stop reason: HOSPADM

## 2020-07-10 RX ORDER — POTASSIUM CHLORIDE 750 MG/1
40 CAPSULE, EXTENDED RELEASE ORAL AS NEEDED
Status: DISCONTINUED | OUTPATIENT
Start: 2020-07-10 | End: 2020-07-10 | Stop reason: HOSPADM

## 2020-07-10 RX ORDER — ALPRAZOLAM 0.25 MG/1
0.25 TABLET ORAL 3 TIMES DAILY PRN
Status: DISCONTINUED | OUTPATIENT
Start: 2020-07-10 | End: 2020-07-10 | Stop reason: HOSPADM

## 2020-07-10 RX ORDER — CLOPIDOGREL BISULFATE 75 MG/1
TABLET ORAL AS NEEDED
Status: DISCONTINUED | OUTPATIENT
Start: 2020-07-10 | End: 2020-07-10 | Stop reason: HOSPADM

## 2020-07-10 RX ORDER — NALOXONE HCL 0.4 MG/ML
0.4 VIAL (ML) INJECTION
Status: DISCONTINUED | OUTPATIENT
Start: 2020-07-10 | End: 2020-07-10 | Stop reason: HOSPADM

## 2020-07-10 RX ORDER — ASPIRIN 325 MG
325 TABLET ORAL DAILY
Status: DISCONTINUED | OUTPATIENT
Start: 2020-07-10 | End: 2020-07-10 | Stop reason: HOSPADM

## 2020-07-10 RX ORDER — CLOPIDOGREL BISULFATE 75 MG/1
75 TABLET ORAL DAILY
Qty: 30 TABLET | Refills: 11 | Status: SHIPPED | OUTPATIENT
Start: 2020-07-10 | End: 2022-09-16 | Stop reason: HOSPADM

## 2020-07-10 RX ADMIN — ASPIRIN 325 MG ORAL TABLET 325 MG: 325 PILL ORAL at 09:03

## 2020-07-10 RX ADMIN — POTASSIUM CHLORIDE 40 MEQ: 10 CAPSULE, COATED, EXTENDED RELEASE ORAL at 09:03

## 2020-07-10 RX ADMIN — ACETAMINOPHEN 650 MG: 325 TABLET, FILM COATED ORAL at 12:33

## 2020-07-10 RX ADMIN — POTASSIUM CHLORIDE 40 MEQ: 10 CAPSULE, COATED, EXTENDED RELEASE ORAL at 12:33

## 2020-07-10 NOTE — H&P
[facility] History & Physical    Referring Provider: Pankaj Pollard MD    Patient Care Team:  Darrius Hilton MD as PCP - General    Chief complaint chest pain    Subjective .     History of present illness: 75-year-old white female with a history of hypertension hyperlipidemia and SVT who has a 6-month history of episodes of neck pain and chest pain associated with dyspnea.  She has not had any tachypalpitations.  She denies orthopnea or PND.  Her pain has been relieved principally by rest and aggravated by stress.  She underwent nuclear stress testing in the office on July 2 which revealed both scar and ischemia in the anterior wall with normal ejection fraction.  She was referred for diagnostic cardiac catheterization for a stratification.    Review of System  A 14 point review of systems was negative except as was stated in the HPI    History  Past Medical History:   Diagnosis Date   • Asthma    • Benign hypertension    • Disease of thyroid gland    • Family history of heart disease    • Family history of heart disease    • Hypercholesterolemia    • Rheumatoid arthritis (CMS/HCC)    • Rheumatoid arthritis (CMS/HCC)    • Supraventricular tachycardia (CMS/HCC)     first diagnosed 06/01/2012   • Supraventricular tachycardia (CMS/HCC)      Past Surgical History:   Procedure Laterality Date   • SINUS SURGERY       Family History   Problem Relation Age of Onset   • Heart disease Other    • Hypertension Sister      Social History     Tobacco Use   • Smoking status: Never Smoker   • Smokeless tobacco: Never Used   Substance Use Topics   • Alcohol use: No   • Drug use: No     Medications Prior to Admission   Medication Sig Dispense Refill Last Dose   • Abatacept (ORENCIA) 125 MG/ML solution prefilled syringe Inject  under the skin 1 (one) time per week.   7/8/2020   • amLODIPine (NORVASC) 5 MG tablet Take 5 mg by mouth 2 (two) times a day.   7/10/2020 at Unknown time   • cholecalciferol (VITAMIN D3)  "25 MCG (1000 UT) tablet Take 2,000 Units by mouth Daily.   7/9/2020 at Unknown time   • fluticasone (FLONASE) 50 MCG/ACT nasal spray 2 sprays into each nostril as needed. Administer 2 sprays in each nostril for each dose.    Taking   • hydroCHLOROthiazide (HYDRODIURIL) 25 MG tablet Take 25 mg by mouth Daily.   7/10/2020 at Unknown time   • leflunomide (ARAVA) 20 MG tablet Take 20 mg by mouth daily.   7/9/2020 at Unknown time   • levothyroxine (SYNTHROID, LEVOTHROID) 75 MCG tablet Take 75 mcg by mouth daily.   7/9/2020 at Unknown time   • LORazepam (ATIVAN) 0.5 MG tablet Take 0.5 mg by mouth every 8 (eight) hours as needed for anxiety.   7/9/2020 at Unknown time   • metoprolol tartrate (LOPRESSOR) 50 MG tablet Take 50 mg by mouth 2 (Two) Times a Day.   7/10/2020 at Unknown time   • Multiple Vitamins-Minerals (WOMENS BONE HEALTH PO) Take 1 tablet by mouth daily.   7/9/2020 at Unknown time   • potassium chloride (MICRO-K) 10 MEQ CR capsule Take 20 mEq by mouth Daily.   7/9/2020 at Unknown time   • pravastatin (PRAVACHOL) 80 MG tablet Take 80 mg by mouth daily.   7/9/2020 at Unknown time   • predniSONE (DELTASONE) 2.5 MG tablet Take 5 mg by mouth daily.   7/9/2020 at Unknown time   • traMADol (ULTRAM) 50 MG tablet Take 50 mg by mouth Every 6 (Six) Hours As Needed for Moderate Pain .        Scheduled Meds:    Continuous Infusions:    No current facility-administered medications for this encounter.   PRN Meds:    Allergies:  Patient has no known allergies.    Objective     Vital Sign Min/Max for last 24 hours  Temp  Min: 97.5 °F (36.4 °C)  Max: 97.5 °F (36.4 °C)   BP  Min: 154/81  Max: 169/77   Pulse  Min: 70  Max: 71   No data recorded   SpO2  Min: 98 %  Max: 98 %   No data recorded   Weight  Min: 67.5 kg (148 lb 13 oz)  Max: 67.5 kg (148 lb 13 oz)     Flowsheet Rows      First Filed Value   Admission Height  160 cm (63\") Documented at 07/10/2020 0625   Admission Weight  67.5 kg (148 lb 13 oz) Documented at 07/10/2020 " 0625             Physical Exam:  General Appearance: Alert, appears stated age and cooperative  Lungs: Clear to ascultation  Heart:: RRR   No Murmurs, Rubs or Gallops  Abdomen: Soft and nontender with adequate bowel sounds.  No organomegaly  Extremities: No cyanosis, clubbing or edema  Pulses: Pulses palpable and equal bilaterally  Skin: Warm and dry with no rash  Psych: Normal    Results Review:   I reviewed the patient's new clinical results.  Results from last 7 days   Lab Units 07/10/20  0626   WBC 10*3/mm3 7.82   HEMOGLOBIN g/dL 12.3   HEMATOCRIT % 38.7   PLATELETS 10*3/mm3 233         Lab Results   Lab Value Date/Time    TROPONINT <0.010 06/21/2020 1112    TROPONINT <0.010 06/21/2020 0855    TROPONINT <0.010 12/13/2019 1810    TROPONINT <0.010 10/12/2019 1434                         Abnormal stress test      Impression      Chest pain and dyspnea with abnormal nuclear study   Hyperlipidemia  Hypertension  SVT  Mild MR  Hypothyroidism      Plan     Cardiac catheterization this morning.        I discussed the patients findings and my recommendations with patient    Pankaj Pollard MD  07/10/20  07:15

## 2020-07-13 ENCOUNTER — CALL CENTER PROGRAMS (OUTPATIENT)
Dept: CALL CENTER | Facility: HOSPITAL | Age: 75
End: 2020-07-13

## 2020-07-13 ENCOUNTER — DOCUMENTATION (OUTPATIENT)
Dept: CARDIAC REHAB | Facility: HOSPITAL | Age: 75
End: 2020-07-13

## 2020-07-13 NOTE — OUTREACH NOTE
PCI Survey      Responses   Facility patient discharged from?  [facility]   Procedure date  07/10/20   PCI site:  Left, Arm   Performing MD  Other (annotate) [Dr Pollard]   Attempt successful?  Yes   Call start time  0955   Call end time  1001   Is the patient taking prescribed medications:  ASA, Plavix   Nursing intervention  Reminded to continue to take prescribed medications   Nursing intervention  Patient education provided   Does the patient have an appointment scheduled with the cardiologist?  Yes   Did the patient feel prepared to go home on the same day as the procedure?  Yes   Is the patient satisfied with the same day discharge process?  Yes   PCI call completed  Yes          Miguel A Thompson RN

## 2020-07-14 ENCOUNTER — DOCUMENTATION (OUTPATIENT)
Dept: CARDIAC REHAB | Facility: HOSPITAL | Age: 75
End: 2020-07-14

## 2020-07-14 NOTE — PROGRESS NOTES
Cardiac Rehab staff mailed referral letter to patient regarding Phase II Cardiac Rehab program. Instruction for patient to contact [facility] Cardiac Rehab Department for additional program information and to forward referral to closest facility.

## 2020-07-20 ENCOUNTER — DOCUMENTATION (OUTPATIENT)
Dept: CARDIAC REHAB | Facility: HOSPITAL | Age: 75
End: 2020-07-20

## 2020-07-20 NOTE — PROGRESS NOTES
Pt. Referred for Phase II Cardiac Rehab. Staff discussed benefits of exercise, program protocol, and educational material provided. Teach back verified.  Permission granted from patient for staff to fax referral information to outlying program at this time.  Staff faxed referral info to [facility].

## 2020-08-11 ENCOUNTER — TRANSCRIBE ORDERS (OUTPATIENT)
Dept: OTHER | Facility: OTHER | Age: 75
End: 2020-08-11

## 2020-08-11 DIAGNOSIS — Z12.31 ENCOUNTER FOR SCREENING MAMMOGRAM FOR MALIGNANT NEOPLASM OF BREAST: Primary | ICD-10-CM

## 2020-08-25 ENCOUNTER — APPOINTMENT (OUTPATIENT)
Dept: CARDIAC REHAB | Facility: HOSPITAL | Age: 75
End: 2020-08-25

## 2020-09-03 ENCOUNTER — TREATMENT (OUTPATIENT)
Dept: CARDIAC REHAB | Facility: HOSPITAL | Age: 75
End: 2020-09-03

## 2020-09-03 DIAGNOSIS — Z95.5 STATUS POST PRIMARY ANGIOPLASTY WITH CORONARY STENT: Primary | ICD-10-CM

## 2020-09-03 PROCEDURE — 93797 PHYS/QHP OP CAR RHAB WO ECG: CPT

## 2020-09-03 PROCEDURE — 93798 PHYS/QHP OP CAR RHAB W/ECG: CPT

## 2020-09-03 NOTE — PROGRESS NOTES
Pt was seen today in CR for a Phase 2 for orientation and evaluation.   Vital signs and session notes recorded in [employer] and will be scanned into Epic by HIM.

## 2020-09-11 ENCOUNTER — TREATMENT (OUTPATIENT)
Dept: CARDIAC REHAB | Facility: HOSPITAL | Age: 75
End: 2020-09-11

## 2020-09-11 DIAGNOSIS — Z95.5 STATUS POST PRIMARY ANGIOPLASTY WITH CORONARY STENT: Primary | ICD-10-CM

## 2020-09-11 PROCEDURE — 93798 PHYS/QHP OP CAR RHAB W/ECG: CPT

## 2020-09-11 NOTE — PROGRESS NOTES
Pt was seen today in CR for a Phase 2 visit.  Vital signs and session notes recorded in [employer] and will be scanned into Epic by HIM.

## 2020-09-16 ENCOUNTER — TREATMENT (OUTPATIENT)
Dept: CARDIAC REHAB | Facility: HOSPITAL | Age: 75
End: 2020-09-16

## 2020-09-16 DIAGNOSIS — Z95.5 STATUS POST PRIMARY ANGIOPLASTY WITH CORONARY STENT: Primary | ICD-10-CM

## 2020-09-16 PROCEDURE — 93798 PHYS/QHP OP CAR RHAB W/ECG: CPT

## 2020-09-16 NOTE — PROGRESS NOTES
Pt was seen today in CR for a Phase 2 visit.  Vital signs and session notes recorded in [employer] and will be scanned into Epic by HIM.

## 2020-09-23 ENCOUNTER — HOSPITAL ENCOUNTER (OUTPATIENT)
Dept: MAMMOGRAPHY | Facility: HOSPITAL | Age: 75
Discharge: HOME | End: 2020-09-23
Admitting: INTERNAL MEDICINE

## 2020-09-23 ENCOUNTER — TREATMENT (OUTPATIENT)
Dept: CARDIAC REHAB | Facility: HOSPITAL | Age: 75
End: 2020-09-23

## 2020-09-23 DIAGNOSIS — Z12.31 ENCOUNTER FOR SCREENING MAMMOGRAM FOR MALIGNANT NEOPLASM OF BREAST: ICD-10-CM

## 2020-09-23 DIAGNOSIS — Z95.5 STATUS POST PRIMARY ANGIOPLASTY WITH CORONARY STENT: Primary | ICD-10-CM

## 2020-09-23 PROCEDURE — 77067 SCR MAMMO BI INCL CAD: CPT

## 2020-09-23 PROCEDURE — 93798 PHYS/QHP OP CAR RHAB W/ECG: CPT

## 2020-09-23 PROCEDURE — 77063 BREAST TOMOSYNTHESIS BI: CPT

## 2020-09-23 NOTE — PROGRESS NOTES
Pt was seen today in CR for a Phase 2 visit.  Vital signs and session notes recorded in [employer] and will be scanned into Epic by HIM.

## 2020-09-30 ENCOUNTER — APPOINTMENT (OUTPATIENT)
Dept: CARDIAC REHAB | Facility: HOSPITAL | Age: 75
End: 2020-09-30

## 2020-10-02 ENCOUNTER — TREATMENT (OUTPATIENT)
Dept: CARDIAC REHAB | Facility: HOSPITAL | Age: 75
End: 2020-10-02

## 2020-10-02 DIAGNOSIS — Z95.5 STATUS POST PRIMARY ANGIOPLASTY WITH CORONARY STENT: Primary | ICD-10-CM

## 2020-10-02 PROCEDURE — 93798 PHYS/QHP OP CAR RHAB W/ECG: CPT

## 2020-10-02 NOTE — PROGRESS NOTES
Pt was seen today in CR for a Phase 2 visit.  Vital signs and session notes recorded in [employer] and will be scanned into Epic by HIM.

## 2020-10-07 ENCOUNTER — TREATMENT (OUTPATIENT)
Dept: CARDIAC REHAB | Facility: HOSPITAL | Age: 75
End: 2020-10-07

## 2020-10-07 DIAGNOSIS — Z95.5 STATUS POST PRIMARY ANGIOPLASTY WITH CORONARY STENT: Primary | ICD-10-CM

## 2020-10-07 PROCEDURE — 93798 PHYS/QHP OP CAR RHAB W/ECG: CPT

## 2020-10-07 NOTE — PROGRESS NOTES
Pt was seen today in CR for a Phase 2 visit.  Vital signs and session notes recorded in [email or web address] and will be scanned into Epic by HIM.

## 2020-10-14 ENCOUNTER — APPOINTMENT (OUTPATIENT)
Dept: CARDIAC REHAB | Facility: HOSPITAL | Age: 75
End: 2020-10-14

## 2020-10-21 ENCOUNTER — APPOINTMENT (OUTPATIENT)
Dept: CARDIAC REHAB | Facility: HOSPITAL | Age: 75
End: 2020-10-21

## 2020-10-28 ENCOUNTER — APPOINTMENT (OUTPATIENT)
Dept: CARDIAC REHAB | Facility: HOSPITAL | Age: 75
End: 2020-10-28

## 2020-11-04 ENCOUNTER — APPOINTMENT (OUTPATIENT)
Dept: CARDIAC REHAB | Facility: HOSPITAL | Age: 75
End: 2020-11-04

## 2020-11-18 ENCOUNTER — APPOINTMENT (OUTPATIENT)
Dept: CARDIAC REHAB | Facility: HOSPITAL | Age: 75
End: 2020-11-18

## 2021-05-13 ENCOUNTER — APPOINTMENT (OUTPATIENT)
Dept: GENERAL RADIOLOGY | Facility: HOSPITAL | Age: 76
End: 2021-05-13

## 2021-05-13 ENCOUNTER — HOSPITAL ENCOUNTER (EMERGENCY)
Facility: HOSPITAL | Age: 76
Discharge: HOME | End: 2021-05-13
Attending: EMERGENCY MEDICINE | Admitting: EMERGENCY MEDICINE

## 2021-05-13 VITALS
OXYGEN SATURATION: 97 % | TEMPERATURE: 97.4 F | SYSTOLIC BLOOD PRESSURE: 116 MMHG | BODY MASS INDEX: 25.61 KG/M2 | RESPIRATION RATE: 18 BRPM | HEART RATE: 66 BPM | HEIGHT: 64 IN | DIASTOLIC BLOOD PRESSURE: 67 MMHG | WEIGHT: 150 LBS

## 2021-05-13 DIAGNOSIS — I47.1 SVT (SUPRAVENTRICULAR TACHYCARDIA) (HCC): Primary | ICD-10-CM

## 2021-05-13 DIAGNOSIS — E87.6 HYPOKALEMIA: ICD-10-CM

## 2021-05-13 LAB
ALBUMIN SERPL-MCNC: 3.8 G/DL (ref 3.5–5.2)
ALBUMIN/GLOB SERPL: 1.2 G/DL
ALP SERPL-CCNC: 67 U/L (ref 39–117)
ALT SERPL W P-5'-P-CCNC: 16 U/L (ref 1–33)
ANION GAP SERPL CALCULATED.3IONS-SCNC: 14.7 MMOL/L (ref 5–15)
AST SERPL-CCNC: 20 U/L (ref 1–32)
BASOPHILS # BLD AUTO: 0.15 10*3/MM3 (ref 0–0.2)
BASOPHILS NFR BLD AUTO: 1.2 % (ref 0–1.5)
BILIRUB SERPL-MCNC: 0.3 MG/DL (ref 0–1.2)
BUN SERPL-MCNC: 13 MG/DL (ref 8–23)
BUN/CREAT SERPL: 16.9 (ref 7–25)
CALCIUM SPEC-SCNC: 9.8 MG/DL (ref 8.6–10.5)
CHLORIDE SERPL-SCNC: 94 MMOL/L (ref 98–107)
CO2 SERPL-SCNC: 27.3 MMOL/L (ref 22–29)
CREAT SERPL-MCNC: 0.77 MG/DL (ref 0.57–1)
DEPRECATED RDW RBC AUTO: 44.1 FL (ref 37–54)
EOSINOPHIL # BLD AUTO: 0.45 10*3/MM3 (ref 0–0.4)
EOSINOPHIL NFR BLD AUTO: 3.5 % (ref 0.3–6.2)
ERYTHROCYTE [DISTWIDTH] IN BLOOD BY AUTOMATED COUNT: 13.5 % (ref 12.3–15.4)
GFR SERPL CREATININE-BSD FRML MDRD: 73 ML/MIN/1.73
GLOBULIN UR ELPH-MCNC: 3.3 GM/DL
GLUCOSE SERPL-MCNC: 135 MG/DL (ref 65–99)
HCT VFR BLD AUTO: 41.2 % (ref 34–46.6)
HGB BLD-MCNC: 13.2 G/DL (ref 12–15.9)
HOLD SPECIMEN: NORMAL
IMM GRANULOCYTES # BLD AUTO: 0.04 10*3/MM3 (ref 0–0.05)
IMM GRANULOCYTES NFR BLD AUTO: 0.3 % (ref 0–0.5)
LYMPHOCYTES # BLD AUTO: 3.95 10*3/MM3 (ref 0.7–3.1)
LYMPHOCYTES NFR BLD AUTO: 31 % (ref 19.6–45.3)
MAGNESIUM SERPL-MCNC: 1.5 MG/DL (ref 1.6–2.4)
MCH RBC QN AUTO: 28.4 PG (ref 26.6–33)
MCHC RBC AUTO-ENTMCNC: 32 G/DL (ref 31.5–35.7)
MCV RBC AUTO: 88.8 FL (ref 79–97)
MONOCYTES # BLD AUTO: 1.82 10*3/MM3 (ref 0.1–0.9)
MONOCYTES NFR BLD AUTO: 14.3 % (ref 5–12)
NEUTROPHILS NFR BLD AUTO: 49.7 % (ref 42.7–76)
NEUTROPHILS NFR BLD AUTO: 6.33 10*3/MM3 (ref 1.7–7)
NRBC BLD AUTO-RTO: 0 /100 WBC (ref 0–0.2)
PLATELET # BLD AUTO: 288 10*3/MM3 (ref 140–450)
PMV BLD AUTO: 10.1 FL (ref 6–12)
POTASSIUM SERPL-SCNC: 2.7 MMOL/L (ref 3.5–5.2)
PROT SERPL-MCNC: 7.1 G/DL (ref 6–8.5)
RBC # BLD AUTO: 4.64 10*6/MM3 (ref 3.77–5.28)
SODIUM SERPL-SCNC: 136 MMOL/L (ref 136–145)
TROPONIN T SERPL-MCNC: <0.01 NG/ML (ref 0–0.03)
WBC # BLD AUTO: 12.74 10*3/MM3 (ref 3.4–10.8)
WHOLE BLOOD HOLD SPECIMEN: NORMAL
WHOLE BLOOD HOLD SPECIMEN: NORMAL

## 2021-05-13 PROCEDURE — 25010000002 ADENOSINE PER 6 MG: Performed by: EMERGENCY MEDICINE

## 2021-05-13 PROCEDURE — 25010000002 MAGNESIUM SULFATE 2 GM/50ML SOLUTION: Performed by: EMERGENCY MEDICINE

## 2021-05-13 PROCEDURE — 71045 X-RAY EXAM CHEST 1 VIEW: CPT

## 2021-05-13 PROCEDURE — 96366 THER/PROPH/DIAG IV INF ADDON: CPT

## 2021-05-13 PROCEDURE — 93005 ELECTROCARDIOGRAM TRACING: CPT | Performed by: EMERGENCY MEDICINE

## 2021-05-13 PROCEDURE — 96368 THER/DIAG CONCURRENT INF: CPT

## 2021-05-13 PROCEDURE — 83735 ASSAY OF MAGNESIUM: CPT | Performed by: EMERGENCY MEDICINE

## 2021-05-13 PROCEDURE — 99284 EMERGENCY DEPT VISIT MOD MDM: CPT

## 2021-05-13 PROCEDURE — 25010000003 POTASSIUM CHLORIDE 10 MEQ/100ML SOLUTION: Performed by: EMERGENCY MEDICINE

## 2021-05-13 PROCEDURE — 85025 COMPLETE CBC W/AUTO DIFF WBC: CPT | Performed by: EMERGENCY MEDICINE

## 2021-05-13 PROCEDURE — 96365 THER/PROPH/DIAG IV INF INIT: CPT

## 2021-05-13 PROCEDURE — 80053 COMPREHEN METABOLIC PANEL: CPT | Performed by: EMERGENCY MEDICINE

## 2021-05-13 PROCEDURE — 84484 ASSAY OF TROPONIN QUANT: CPT | Performed by: EMERGENCY MEDICINE

## 2021-05-13 RX ORDER — POTASSIUM CHLORIDE 750 MG/1
10 TABLET, FILM COATED, EXTENDED RELEASE ORAL 2 TIMES DAILY
Qty: 14 TABLET | Refills: 0 | Status: SHIPPED | OUTPATIENT
Start: 2021-05-13 | End: 2022-09-16 | Stop reason: HOSPADM

## 2021-05-13 RX ORDER — POTASSIUM CHLORIDE 7.45 MG/ML
10 INJECTION INTRAVENOUS ONCE
Status: COMPLETED | OUTPATIENT
Start: 2021-05-13 | End: 2021-05-13

## 2021-05-13 RX ORDER — SODIUM CHLORIDE 0.9 % (FLUSH) 0.9 %
10 SYRINGE (ML) INJECTION AS NEEDED
Status: DISCONTINUED | OUTPATIENT
Start: 2021-05-13 | End: 2021-05-13 | Stop reason: HOSPADM

## 2021-05-13 RX ORDER — ADENOSINE 3 MG/ML
6 INJECTION, SOLUTION INTRAVENOUS ONCE
Status: COMPLETED | OUTPATIENT
Start: 2021-05-13 | End: 2021-05-13

## 2021-05-13 RX ORDER — MAGNESIUM SULFATE HEPTAHYDRATE 40 MG/ML
2 INJECTION, SOLUTION INTRAVENOUS ONCE
Status: COMPLETED | OUTPATIENT
Start: 2021-05-13 | End: 2021-05-13

## 2021-05-13 RX ORDER — POTASSIUM CHLORIDE 750 MG/1
20 CAPSULE, EXTENDED RELEASE ORAL ONCE
Status: COMPLETED | OUTPATIENT
Start: 2021-05-13 | End: 2021-05-13

## 2021-05-13 RX ADMIN — POTASSIUM CHLORIDE 10 MEQ: 7.46 INJECTION, SOLUTION INTRAVENOUS at 11:07

## 2021-05-13 RX ADMIN — MAGNESIUM SULFATE HEPTAHYDRATE 2 G: 40 INJECTION, SOLUTION INTRAVENOUS at 10:38

## 2021-05-13 RX ADMIN — ADENOSINE 6 MG: 3 INJECTION INTRAVENOUS at 10:33

## 2021-05-13 RX ADMIN — POTASSIUM CHLORIDE 20 MEQ: 10 CAPSULE, COATED, EXTENDED RELEASE ORAL at 10:38

## 2021-05-17 ENCOUNTER — PATIENT OUTREACH (OUTPATIENT)
Dept: CASE MANAGEMENT | Facility: OTHER | Age: 76
End: 2021-05-17

## 2021-05-17 NOTE — OUTREACH NOTE
Patient Outreach Note    RN-ACM outreach with patient.  Patient had an ED visit at [facility] 05/13/21.  Patient presented with c/o rapid heart rate. Patient was treated and discharged to home to follow with PCP.  Clinical impression noted as SVT and hypokalemia. Medication changes at discharge include the addition of potassium chloride.      AVS, education, f/u instructions and discharge medications reviewed.  Patient encouraged to follow up with PCP in 1 week as recommended for labs and monitoring of medication change.  Patient v/u and reported to have an appointment on the schedule.  She was away from home at the time of our call and unsure of exact date.  Other needs/questions/concerns denied.     Lottie Fuller RN  Ambulatory     5/17/2021, 15:44 EDT

## 2021-08-19 ENCOUNTER — HOSPITAL ENCOUNTER (EMERGENCY)
Facility: HOSPITAL | Age: 76
Discharge: HOME | End: 2021-08-19
Attending: EMERGENCY MEDICINE | Admitting: EMERGENCY MEDICINE

## 2021-08-19 ENCOUNTER — APPOINTMENT (OUTPATIENT)
Dept: GENERAL RADIOLOGY | Facility: HOSPITAL | Age: 76
End: 2021-08-19

## 2021-08-19 VITALS
OXYGEN SATURATION: 97 % | RESPIRATION RATE: 18 BRPM | SYSTOLIC BLOOD PRESSURE: 120 MMHG | HEART RATE: 75 BPM | TEMPERATURE: 98.1 F | DIASTOLIC BLOOD PRESSURE: 66 MMHG | HEIGHT: 64 IN | WEIGHT: 151.8 LBS | BODY MASS INDEX: 25.92 KG/M2

## 2021-08-19 DIAGNOSIS — I47.1 SVT (SUPRAVENTRICULAR TACHYCARDIA) (HCC): Primary | ICD-10-CM

## 2021-08-19 LAB
ALBUMIN SERPL-MCNC: 4 G/DL (ref 3.5–5.2)
ALBUMIN/GLOB SERPL: 1.2 G/DL
ALP SERPL-CCNC: 66 U/L (ref 39–117)
ALT SERPL W P-5'-P-CCNC: 17 U/L (ref 1–33)
ANION GAP SERPL CALCULATED.3IONS-SCNC: 12.5 MMOL/L (ref 5–15)
AST SERPL-CCNC: 20 U/L (ref 1–32)
BASOPHILS # BLD AUTO: 0.13 10*3/MM3 (ref 0–0.2)
BASOPHILS NFR BLD AUTO: 1.3 % (ref 0–1.5)
BILIRUB SERPL-MCNC: 0.3 MG/DL (ref 0–1.2)
BUN SERPL-MCNC: 15 MG/DL (ref 8–23)
BUN/CREAT SERPL: 12.4 (ref 7–25)
CALCIUM SPEC-SCNC: 10.4 MG/DL (ref 8.6–10.5)
CHLORIDE SERPL-SCNC: 90 MMOL/L (ref 98–107)
CO2 SERPL-SCNC: 24.5 MMOL/L (ref 22–29)
CREAT SERPL-MCNC: 1.21 MG/DL (ref 0.57–1)
DEPRECATED RDW RBC AUTO: 43.8 FL (ref 37–54)
EOSINOPHIL # BLD AUTO: 0.06 10*3/MM3 (ref 0–0.4)
EOSINOPHIL NFR BLD AUTO: 0.6 % (ref 0.3–6.2)
ERYTHROCYTE [DISTWIDTH] IN BLOOD BY AUTOMATED COUNT: 13.5 % (ref 12.3–15.4)
GFR SERPL CREATININE-BSD FRML MDRD: 43 ML/MIN/1.73
GLOBULIN UR ELPH-MCNC: 3.3 GM/DL
GLUCOSE SERPL-MCNC: 143 MG/DL (ref 65–99)
HCT VFR BLD AUTO: 37.8 % (ref 34–46.6)
HGB BLD-MCNC: 12.3 G/DL (ref 12–15.9)
HOLD SPECIMEN: NORMAL
HOLD SPECIMEN: NORMAL
IMM GRANULOCYTES # BLD AUTO: 0.04 10*3/MM3 (ref 0–0.05)
IMM GRANULOCYTES NFR BLD AUTO: 0.4 % (ref 0–0.5)
LYMPHOCYTES # BLD AUTO: 3.11 10*3/MM3 (ref 0.7–3.1)
LYMPHOCYTES NFR BLD AUTO: 30 % (ref 19.6–45.3)
MAGNESIUM SERPL-MCNC: 1.7 MG/DL (ref 1.6–2.4)
MCH RBC QN AUTO: 28.7 PG (ref 26.6–33)
MCHC RBC AUTO-ENTMCNC: 32.5 G/DL (ref 31.5–35.7)
MCV RBC AUTO: 88.1 FL (ref 79–97)
MONOCYTES # BLD AUTO: 1.24 10*3/MM3 (ref 0.1–0.9)
MONOCYTES NFR BLD AUTO: 11.9 % (ref 5–12)
NEUTROPHILS NFR BLD AUTO: 5.8 10*3/MM3 (ref 1.7–7)
NEUTROPHILS NFR BLD AUTO: 55.8 % (ref 42.7–76)
NRBC BLD AUTO-RTO: 0 /100 WBC (ref 0–0.2)
PLATELET # BLD AUTO: 268 10*3/MM3 (ref 140–450)
PMV BLD AUTO: 10.6 FL (ref 6–12)
POTASSIUM SERPL-SCNC: 3.3 MMOL/L (ref 3.5–5.2)
PROT SERPL-MCNC: 7.3 G/DL (ref 6–8.5)
RBC # BLD AUTO: 4.29 10*6/MM3 (ref 3.77–5.28)
SODIUM SERPL-SCNC: 127 MMOL/L (ref 136–145)
TROPONIN T SERPL-MCNC: <0.01 NG/ML (ref 0–0.03)
WBC # BLD AUTO: 10.38 10*3/MM3 (ref 3.4–10.8)
WHOLE BLOOD HOLD SPECIMEN: NORMAL

## 2021-08-19 PROCEDURE — 85025 COMPLETE CBC W/AUTO DIFF WBC: CPT

## 2021-08-19 PROCEDURE — 71045 X-RAY EXAM CHEST 1 VIEW: CPT

## 2021-08-19 PROCEDURE — 83735 ASSAY OF MAGNESIUM: CPT

## 2021-08-19 PROCEDURE — 99284 EMERGENCY DEPT VISIT MOD MDM: CPT

## 2021-08-19 PROCEDURE — 25010000002 ADENOSINE PER 6 MG: Performed by: EMERGENCY MEDICINE

## 2021-08-19 PROCEDURE — 93005 ELECTROCARDIOGRAM TRACING: CPT | Performed by: EMERGENCY MEDICINE

## 2021-08-19 PROCEDURE — 96374 THER/PROPH/DIAG INJ IV PUSH: CPT

## 2021-08-19 PROCEDURE — 84484 ASSAY OF TROPONIN QUANT: CPT

## 2021-08-19 PROCEDURE — 99195 PHLEBOTOMY: CPT

## 2021-08-19 PROCEDURE — 93005 ELECTROCARDIOGRAM TRACING: CPT

## 2021-08-19 PROCEDURE — 80053 COMPREHEN METABOLIC PANEL: CPT

## 2021-08-19 RX ORDER — SODIUM CHLORIDE 0.9 % (FLUSH) 0.9 %
10 SYRINGE (ML) INJECTION AS NEEDED
Status: DISCONTINUED | OUTPATIENT
Start: 2021-08-19 | End: 2021-08-19 | Stop reason: HOSPADM

## 2021-08-19 RX ORDER — ADENOSINE 3 MG/ML
6 INJECTION, SOLUTION INTRAVENOUS ONCE
Status: COMPLETED | OUTPATIENT
Start: 2021-08-19 | End: 2021-08-19

## 2021-08-19 RX ORDER — POTASSIUM CHLORIDE 750 MG/1
20 CAPSULE, EXTENDED RELEASE ORAL ONCE
Status: COMPLETED | OUTPATIENT
Start: 2021-08-19 | End: 2021-08-19

## 2021-08-19 RX ADMIN — ADENOSINE 6 MG: 3 INJECTION INTRAVENOUS at 16:25

## 2021-08-19 RX ADMIN — POTASSIUM CHLORIDE 20 MEQ: 10 CAPSULE, COATED, EXTENDED RELEASE ORAL at 17:47

## 2021-08-19 RX ADMIN — SODIUM CHLORIDE 1000 ML: 9 INJECTION, SOLUTION INTRAVENOUS at 16:19

## 2021-08-19 NOTE — ED PROVIDER NOTES
TRIAGE CHIEF COMPLAINT:     Nursing and triage notes reviewed    Chief Complaint   Patient presents with   • Rapid Heart Rate      HPI: Vane Villavicencio is a 76 y.o. female who presents to the emergency department complaining of palpitations and a rapid heart rate.  Patient has a history of supraventricular tachycardia and she states this feels similar to episode she has had in the past.  Patient states her symptoms began at 3, approximately an hour and a half prior to arrival.  Patient tried taking a dose of metoprolol at home however she states that this did not help her symptoms.  Patient also states she attempted to have a bowel movement and bear down but this also did not help break her SVT.  She denies having any chest pain.  She does states she feels a little lightheaded.  She denies abdominal pain but does have some mild nausea.    REVIEW OF SYSTEMS: All other systems reviewed and are negative     PAST MEDICAL HISTORY:   Past Medical History:   Diagnosis Date   • Asthma    • Benign hypertension    • Disease of thyroid gland    • Family history of heart disease    • Family history of heart disease    • Hypercholesterolemia    • Rheumatoid arthritis (CMS/HCC)    • Rheumatoid arthritis (CMS/HCC)    • Supraventricular tachycardia (CMS/HCC)     first diagnosed 06/01/2012   • Supraventricular tachycardia (CMS/HCC)         FAMILY HISTORY:   Family History   Problem Relation Age of Onset   • Heart disease Other    • Hypertension Sister         SOCIAL HISTORY:   Social History     Socioeconomic History   • Marital status:      Spouse name: Not on file   • Number of children: Not on file   • Years of education: Not on file   • Highest education level: Not on file   Tobacco Use   • Smoking status: Never Smoker   • Smokeless tobacco: Never Used   Vaping Use   • Vaping Use: Never used   Substance and Sexual Activity   • Alcohol use: No   • Drug use: No   • Sexual activity: Defer        SURGICAL HISTORY:   Past  Surgical History:   Procedure Laterality Date   • CARDIAC CATHETERIZATION N/A 7/10/2020    Procedure: Left Heart Cath;  Surgeon: Pankaj Pollard MD;  Location: [facility] CATH INVASIVE LOCATION;  Service: Cardiology;  Laterality: N/A;   • SINUS SURGERY          CURRENT MEDICATIONS:      Medication List      ASK your doctor about these medications    amLODIPine 5 MG tablet  Commonly known as: NORVASC     aspirin 81 MG EC tablet     cholecalciferol 25 MCG (1000 UT) tablet  Commonly known as: VITAMIN D3     clopidogrel 75 MG tablet  Commonly known as: PLAVIX  Take 1 tablet by mouth Daily.     fluticasone 50 MCG/ACT nasal spray  Commonly known as: FLONASE     hydroCHLOROthiazide 25 MG tablet  Commonly known as: HYDRODIURIL     leflunomide 20 MG tablet  Commonly known as: ARAVA     levothyroxine 75 MCG tablet  Commonly known as: SYNTHROID, LEVOTHROID     LORazepam 0.5 MG tablet  Commonly known as: ATIVAN     metoprolol tartrate 50 MG tablet  Commonly known as: LOPRESSOR     multivitamin with minerals tablet tablet     Orencia 125 MG/ML solution prefilled syringe  Generic drug: Abatacept     * potassium chloride 10 MEQ CR capsule  Commonly known as: MICRO-K     * potassium chloride 10 MEQ CR tablet  Take 1 tablet by mouth 2 (Two) Times a Day.     pravastatin 80 MG tablet  Commonly known as: PRAVACHOL     predniSONE 2.5 MG tablet  Commonly known as: DELTASONE     traMADol 50 MG tablet  Commonly known as: ULTRAM         * This list has 2 medication(s) that are the same as other medications prescribed for you. Read the directions carefully, and ask your doctor or other care provider to review them with you.                 ALLERGIES: Patient has no known allergies.     PHYSICAL EXAM:   VITAL SIGNS:   Vitals:    08/19/21 1600   BP: (!) 86/42   Pulse: (!) 159   Resp: 18   Temp: 98.1 °F (36.7 °C)   SpO2: 98%      CONSTITUTIONAL: Awake, oriented, appears non-toxic   HENT: Atraumatic, normocephalic, oral mucosa pink and moist,  airway patent. Nares patent without drainage. External ears normal.   EYES: Conjunctiva clear   NECK: Trachea midline  CARDIOVASCULAR: Tachycardic with a regular rhythm, No murmurs, rubs, gallops   PULMONARY/CHEST: Clear to auscultation, no rhonchi, wheezes, or rales. Symmetrical breath sounds   ABDOMINAL: Non-distended, soft, non-tender - no rebound or guarding.  NEUROLOGIC: Non-focal, moving all four extremities, no gross sensory or motor deficits.   EXTREMITIES: No clubbing, cyanosis, or edema   SKIN: Warm, Dry, No erythema, No rash     ED COURSE / MEDICAL DECISION MAKING:   Vane Villavicencio is a 76 y.o. female who presents to the emergency department for evaluation of palpitations.  Patient is brought back to room and placed on a monitor immediately on arrival.  Patient is found to be tachycardic and has a mildly low blood pressure at 86/42.  Her other vital signs are stable.    An EKG was obtained on arrival which I interpreted and reveals supraventricular tachycardia with rate of 156 bpm.  There are nonspecific ST and T wave changes.  This is an abnormal appearing EKG.    An IV was established and patient was started on a bolus of IV fluids to try and help with her blood pressure.  At that time the patient was placed on our cardiac defibrillator in case she had deterioration in her symptoms.  Patient was given 6 mg of adenosine IV push and soon had conversion to a normal sinus rhythm.  Patient reports significant improvement in her symptoms after the conversion.    A repeat EKG was obtained at that time which I also interpreted and reveals sinus tachycardia with rate of 102 bpm.  There are some low QRS voltage in chest leads but no obvious acute ischemic changes.  This is an atypical appearing EKG.    Chest x-ray per my interpretation does not reveal any obvious acute process.    Laboratory testing reveals a slightly low potassium, this was replaced in the emergency department.  Patient's creatinine mildly  elevated at 1.2.  This is above her baseline.  She was given IV fluids to help correct this.  Patient's blood pressure improved after conversion to normal sinus rhythm.  Patient was observed several hours without further symptoms or episodes of SVT.  Patient requesting discharge home at this time.  Will give strict return precautions.    Critical care time for this encounter excluding any procedure: 30 minutes    DECISION TO DISCHARGE/ADMIT: see ED care timeline     FINAL IMPRESSION:   1 --supraventricular tachycardia  2 --   3 --     Electronically signed by: Valarie Avila MD, 8/19/2021 16:18 EDT       Valarie Avila MD  08/19/21 1816       Valarie Avila MD  08/19/21 1816

## 2021-11-01 ENCOUNTER — HOSPITAL ENCOUNTER (OUTPATIENT)
Dept: GENERAL RADIOLOGY | Facility: HOSPITAL | Age: 76
Discharge: HOME | End: 2021-11-01
Admitting: INTERNAL MEDICINE

## 2021-11-01 ENCOUNTER — TRANSCRIBE ORDERS (OUTPATIENT)
Dept: GENERAL RADIOLOGY | Facility: HOSPITAL | Age: 76
End: 2021-11-01

## 2021-11-01 DIAGNOSIS — M06.9 RHEUMATOID ARTHRITIS INVOLVING BOTH FEET, UNSPECIFIED WHETHER RHEUMATOID FACTOR PRESENT (HCC): Primary | ICD-10-CM

## 2021-11-01 DIAGNOSIS — M06.9 RHEUMATOID ARTHRITIS INVOLVING BOTH FEET, UNSPECIFIED WHETHER RHEUMATOID FACTOR PRESENT (HCC): ICD-10-CM

## 2021-11-01 PROCEDURE — 73630 X-RAY EXAM OF FOOT: CPT

## 2022-03-23 ENCOUNTER — TRANSCRIBE ORDERS (OUTPATIENT)
Dept: ADMINISTRATIVE | Facility: HOSPITAL | Age: 77
End: 2022-03-23

## 2022-03-23 DIAGNOSIS — Z12.31 VISIT FOR SCREENING MAMMOGRAM: Primary | ICD-10-CM

## 2022-04-15 ENCOUNTER — HOSPITAL ENCOUNTER (EMERGENCY)
Facility: HOSPITAL | Age: 77
Discharge: HOME | End: 2022-04-15
Attending: EMERGENCY MEDICINE | Admitting: EMERGENCY MEDICINE

## 2022-04-15 ENCOUNTER — APPOINTMENT (OUTPATIENT)
Dept: GENERAL RADIOLOGY | Facility: HOSPITAL | Age: 77
End: 2022-04-15

## 2022-04-15 VITALS
WEIGHT: 155 LBS | BODY MASS INDEX: 27.46 KG/M2 | DIASTOLIC BLOOD PRESSURE: 71 MMHG | SYSTOLIC BLOOD PRESSURE: 102 MMHG | OXYGEN SATURATION: 98 % | RESPIRATION RATE: 18 BRPM | HEART RATE: 65 BPM | HEIGHT: 63 IN | TEMPERATURE: 98.6 F

## 2022-04-15 DIAGNOSIS — I47.1 SVT (SUPRAVENTRICULAR TACHYCARDIA): Primary | ICD-10-CM

## 2022-04-15 DIAGNOSIS — E87.6 HYPOKALEMIA: ICD-10-CM

## 2022-04-15 LAB
ALBUMIN SERPL-MCNC: 4.2 G/DL (ref 3.5–5.2)
ALBUMIN/GLOB SERPL: 1.4 G/DL
ALP SERPL-CCNC: 51 U/L (ref 39–117)
ALT SERPL W P-5'-P-CCNC: 26 U/L (ref 1–33)
ANION GAP SERPL CALCULATED.3IONS-SCNC: 18.6 MMOL/L (ref 5–15)
AST SERPL-CCNC: 25 U/L (ref 1–32)
BASOPHILS # BLD AUTO: 0.1 10*3/MM3 (ref 0–0.2)
BASOPHILS NFR BLD AUTO: 1 % (ref 0–1.5)
BILIRUB SERPL-MCNC: 0.4 MG/DL (ref 0–1.2)
BUN SERPL-MCNC: 12 MG/DL (ref 8–23)
BUN/CREAT SERPL: 13 (ref 7–25)
CALCIUM SPEC-SCNC: 9.8 MG/DL (ref 8.6–10.5)
CHLORIDE SERPL-SCNC: 93 MMOL/L (ref 98–107)
CO2 SERPL-SCNC: 24.4 MMOL/L (ref 22–29)
CREAT SERPL-MCNC: 0.92 MG/DL (ref 0.57–1)
DEPRECATED RDW RBC AUTO: 42.5 FL (ref 37–54)
EGFRCR SERPLBLD CKD-EPI 2021: 65.5 ML/MIN/1.73
EOSINOPHIL # BLD AUTO: 0.02 10*3/MM3 (ref 0–0.4)
EOSINOPHIL NFR BLD AUTO: 0.2 % (ref 0.3–6.2)
ERYTHROCYTE [DISTWIDTH] IN BLOOD BY AUTOMATED COUNT: 13.4 % (ref 12.3–15.4)
GLOBULIN UR ELPH-MCNC: 2.9 GM/DL
GLUCOSE SERPL-MCNC: 188 MG/DL (ref 65–99)
HCT VFR BLD AUTO: 38.2 % (ref 34–46.6)
HGB BLD-MCNC: 12.8 G/DL (ref 12–15.9)
HOLD SPECIMEN: NORMAL
HOLD SPECIMEN: NORMAL
IMM GRANULOCYTES # BLD AUTO: 0.04 10*3/MM3 (ref 0–0.05)
IMM GRANULOCYTES NFR BLD AUTO: 0.4 % (ref 0–0.5)
LYMPHOCYTES # BLD AUTO: 2.98 10*3/MM3 (ref 0.7–3.1)
LYMPHOCYTES NFR BLD AUTO: 30.2 % (ref 19.6–45.3)
MAGNESIUM SERPL-MCNC: 2.1 MG/DL (ref 1.6–2.4)
MCH RBC QN AUTO: 29 PG (ref 26.6–33)
MCHC RBC AUTO-ENTMCNC: 33.5 G/DL (ref 31.5–35.7)
MCV RBC AUTO: 86.6 FL (ref 79–97)
MONOCYTES # BLD AUTO: 0.75 10*3/MM3 (ref 0.1–0.9)
MONOCYTES NFR BLD AUTO: 7.6 % (ref 5–12)
NEUTROPHILS NFR BLD AUTO: 5.98 10*3/MM3 (ref 1.7–7)
NEUTROPHILS NFR BLD AUTO: 60.6 % (ref 42.7–76)
NRBC BLD AUTO-RTO: 0 /100 WBC (ref 0–0.2)
PLATELET # BLD AUTO: 244 10*3/MM3 (ref 140–450)
PMV BLD AUTO: 10.9 FL (ref 6–12)
POTASSIUM SERPL-SCNC: 3.1 MMOL/L (ref 3.5–5.2)
PROT SERPL-MCNC: 7.1 G/DL (ref 6–8.5)
RBC # BLD AUTO: 4.41 10*6/MM3 (ref 3.77–5.28)
SODIUM SERPL-SCNC: 136 MMOL/L (ref 136–145)
TROPONIN T SERPL-MCNC: <0.01 NG/ML (ref 0–0.03)
WBC NRBC COR # BLD: 9.87 10*3/MM3 (ref 3.4–10.8)
WHOLE BLOOD HOLD SPECIMEN: NORMAL
WHOLE BLOOD HOLD SPECIMEN: NORMAL

## 2022-04-15 PROCEDURE — 83735 ASSAY OF MAGNESIUM: CPT | Performed by: EMERGENCY MEDICINE

## 2022-04-15 PROCEDURE — 84484 ASSAY OF TROPONIN QUANT: CPT | Performed by: EMERGENCY MEDICINE

## 2022-04-15 PROCEDURE — 85025 COMPLETE CBC W/AUTO DIFF WBC: CPT | Performed by: EMERGENCY MEDICINE

## 2022-04-15 PROCEDURE — 80053 COMPREHEN METABOLIC PANEL: CPT | Performed by: EMERGENCY MEDICINE

## 2022-04-15 PROCEDURE — 99283 EMERGENCY DEPT VISIT LOW MDM: CPT

## 2022-04-15 PROCEDURE — 71045 X-RAY EXAM CHEST 1 VIEW: CPT

## 2022-04-15 PROCEDURE — 93005 ELECTROCARDIOGRAM TRACING: CPT | Performed by: EMERGENCY MEDICINE

## 2022-04-15 RX ORDER — SODIUM CHLORIDE 0.9 % (FLUSH) 0.9 %
10 SYRINGE (ML) INJECTION AS NEEDED
Status: DISCONTINUED | OUTPATIENT
Start: 2022-04-15 | End: 2022-04-15 | Stop reason: HOSPADM

## 2022-04-15 RX ORDER — POTASSIUM CHLORIDE 750 MG/1
40 CAPSULE, EXTENDED RELEASE ORAL ONCE
Status: COMPLETED | OUTPATIENT
Start: 2022-04-15 | End: 2022-04-15

## 2022-04-15 RX ADMIN — POTASSIUM CHLORIDE 40 MEQ: 750 CAPSULE, EXTENDED RELEASE ORAL at 07:54

## 2022-04-15 NOTE — ED PROVIDER NOTES
"Subjective   76-year-old female presenting with palpitations.  She states that she woke up this morning and \"my body was rocking and I knew\", she is states that she knew she was in SVT.  Felt palpitations, chest tightness and some nausea.  She took her morning metoprolol, tried vagal maneuvers and came to the hospital for evaluation.  She notes long history of SVT.  Denies any other complaints at this time.  On my evaluation she has converted to a sinus rhythm.          Review of Systems   Constitutional: Negative.    HENT: Negative.    Eyes: Negative.    Respiratory: Positive for chest tightness. Negative for shortness of breath.    Cardiovascular: Positive for palpitations. Negative for chest pain.   Gastrointestinal: Positive for nausea. Negative for abdominal pain and vomiting.   Genitourinary: Negative.    Musculoskeletal: Negative.    Skin: Negative.    Neurological: Negative.    Psychiatric/Behavioral: Negative.        Past Medical History:   Diagnosis Date   • Asthma    • Benign hypertension    • Disease of thyroid gland    • Family history of heart disease    • Family history of heart disease    • Hypercholesterolemia    • Rheumatoid arthritis (HCC)    • Rheumatoid arthritis (HCC)    • Supraventricular tachycardia (HCC)     first diagnosed 06/01/2012   • Supraventricular tachycardia (HCC)        No Known Allergies    Past Surgical History:   Procedure Laterality Date   • CARDIAC CATHETERIZATION N/A 7/10/2020    Procedure: Left Heart Cath;  Surgeon: Pankaj Pollard MD;  Location: [facility] CATH INVASIVE LOCATION;  Service: Cardiology;  Laterality: N/A;   • SINUS SURGERY         Family History   Problem Relation Age of Onset   • Heart disease Other    • Hypertension Sister        Social History     Socioeconomic History   • Marital status:    Tobacco Use   • Smoking status: Never Smoker   • Smokeless tobacco: Never Used   Vaping Use   • Vaping Use: Never used   Substance and Sexual Activity   • " Alcohol use: No   • Drug use: No   • Sexual activity: Defer           Objective   Physical Exam  Constitutional:       General: She is not in acute distress.     Appearance: Normal appearance. She is not ill-appearing, toxic-appearing or diaphoretic.   HENT:      Head: Normocephalic and atraumatic.      Right Ear: External ear normal.      Left Ear: External ear normal.      Nose: Nose normal.   Eyes:      Extraocular Movements: Extraocular movements intact.   Cardiovascular:      Rate and Rhythm: Normal rate and regular rhythm.      Pulses: Normal pulses.      Heart sounds: Normal heart sounds.   Pulmonary:      Effort: Pulmonary effort is normal. No respiratory distress.      Breath sounds: Normal breath sounds.   Abdominal:      General: Bowel sounds are normal. There is no distension.      Tenderness: There is no abdominal tenderness.   Musculoskeletal:         General: No swelling, tenderness or deformity. Normal range of motion.      Cervical back: Normal range of motion.   Skin:     General: Skin is warm and dry.      Capillary Refill: Capillary refill takes less than 2 seconds.      Findings: No rash.   Neurological:      General: No focal deficit present.      Mental Status: She is alert and oriented to person, place, and time.   Psychiatric:         Mood and Affect: Mood normal.         Behavior: Behavior normal.         Procedures           ED Course                                                 MDM  Number of Diagnoses or Management Options  Hypokalemia  SVT (supraventricular tachycardia) (HCC)  Diagnosis management comments: 76-year-old female with palpitations.  Well-developed and well-nourished lady in no distress with exam as above.  Her vital signs are normal at this time.  Will obtain basic labs and continue to monitor.  Disposition pending.    DDx: SVT, arrhythmia, electrolyte abnormality    EKG interpreted by me: SVT, rate 150, some nonspecific ST/T changes, this is an abnormal EKG    Repeat  EKG interpreted by me: Sinus rhythm, normal rate, no acute ST/T changes, poor R wave progression, this is an abnormal EKG    Work-up here notable for mild hypokalemia.  She is remained in a sinus rhythm with a normal rate throughout her stay here in the ER.  Will discharge home with outpatient follow-up.  She is happy with this plan.      Final diagnoses:   SVT (supraventricular tachycardia) (HCC)   Hypokalemia          Pankaj Turner MD  04/15/22 0805

## 2022-06-18 ENCOUNTER — HOSPITAL ENCOUNTER (EMERGENCY)
Facility: HOSPITAL | Age: 77
Discharge: HOME | End: 2022-06-18
Attending: EMERGENCY MEDICINE | Admitting: EMERGENCY MEDICINE

## 2022-06-18 VITALS
BODY MASS INDEX: 25.27 KG/M2 | HEART RATE: 75 BPM | WEIGHT: 148 LBS | RESPIRATION RATE: 20 BRPM | HEIGHT: 64 IN | DIASTOLIC BLOOD PRESSURE: 81 MMHG | TEMPERATURE: 97.5 F | SYSTOLIC BLOOD PRESSURE: 123 MMHG | OXYGEN SATURATION: 98 %

## 2022-06-18 DIAGNOSIS — I47.1 SVT (SUPRAVENTRICULAR TACHYCARDIA): Primary | ICD-10-CM

## 2022-06-18 LAB
ALBUMIN SERPL-MCNC: 4.1 G/DL (ref 3.5–5.2)
ALBUMIN/GLOB SERPL: 1.3 G/DL
ALP SERPL-CCNC: 50 U/L (ref 39–117)
ALT SERPL W P-5'-P-CCNC: 40 U/L (ref 1–33)
ANION GAP SERPL CALCULATED.3IONS-SCNC: 19.2 MMOL/L (ref 5–15)
AST SERPL-CCNC: 33 U/L (ref 1–32)
BASOPHILS # BLD AUTO: 0.16 10*3/MM3 (ref 0–0.2)
BASOPHILS NFR BLD AUTO: 1.1 % (ref 0–1.5)
BILIRUB SERPL-MCNC: 0.5 MG/DL (ref 0–1.2)
BUN SERPL-MCNC: 12 MG/DL (ref 8–23)
BUN/CREAT SERPL: 12.1 (ref 7–25)
CALCIUM SPEC-SCNC: 10 MG/DL (ref 8.6–10.5)
CHLORIDE SERPL-SCNC: 90 MMOL/L (ref 98–107)
CO2 SERPL-SCNC: 22.8 MMOL/L (ref 22–29)
CREAT SERPL-MCNC: 0.99 MG/DL (ref 0.57–1)
DEPRECATED RDW RBC AUTO: 48.9 FL (ref 37–54)
EGFRCR SERPLBLD CKD-EPI 2021: 59.6 ML/MIN/1.73
EOSINOPHIL # BLD AUTO: 0.12 10*3/MM3 (ref 0–0.4)
EOSINOPHIL NFR BLD AUTO: 0.8 % (ref 0.3–6.2)
ERYTHROCYTE [DISTWIDTH] IN BLOOD BY AUTOMATED COUNT: 15.2 % (ref 12.3–15.4)
GLOBULIN UR ELPH-MCNC: 3.2 GM/DL
GLUCOSE SERPL-MCNC: 125 MG/DL (ref 65–99)
HCT VFR BLD AUTO: 37.9 % (ref 34–46.6)
HGB BLD-MCNC: 12.8 G/DL (ref 12–15.9)
IMM GRANULOCYTES # BLD AUTO: 0.07 10*3/MM3 (ref 0–0.05)
IMM GRANULOCYTES NFR BLD AUTO: 0.5 % (ref 0–0.5)
LYMPHOCYTES # BLD AUTO: 4.57 10*3/MM3 (ref 0.7–3.1)
LYMPHOCYTES NFR BLD AUTO: 32.1 % (ref 19.6–45.3)
MCH RBC QN AUTO: 29.9 PG (ref 26.6–33)
MCHC RBC AUTO-ENTMCNC: 33.8 G/DL (ref 31.5–35.7)
MCV RBC AUTO: 88.6 FL (ref 79–97)
MONOCYTES # BLD AUTO: 1.73 10*3/MM3 (ref 0.1–0.9)
MONOCYTES NFR BLD AUTO: 12.1 % (ref 5–12)
NEUTROPHILS NFR BLD AUTO: 53.4 % (ref 42.7–76)
NEUTROPHILS NFR BLD AUTO: 7.6 10*3/MM3 (ref 1.7–7)
NRBC BLD AUTO-RTO: 0 /100 WBC (ref 0–0.2)
PLATELET # BLD AUTO: 264 10*3/MM3 (ref 140–450)
PMV BLD AUTO: 10.7 FL (ref 6–12)
POTASSIUM SERPL-SCNC: 3.4 MMOL/L (ref 3.5–5.2)
PROT SERPL-MCNC: 7.3 G/DL (ref 6–8.5)
RBC # BLD AUTO: 4.28 10*6/MM3 (ref 3.77–5.28)
SODIUM SERPL-SCNC: 132 MMOL/L (ref 136–145)
TROPONIN T SERPL-MCNC: <0.01 NG/ML (ref 0–0.03)
WBC NRBC COR # BLD: 14.25 10*3/MM3 (ref 3.4–10.8)

## 2022-06-18 PROCEDURE — 99283 EMERGENCY DEPT VISIT LOW MDM: CPT

## 2022-06-18 PROCEDURE — 93005 ELECTROCARDIOGRAM TRACING: CPT

## 2022-06-18 PROCEDURE — 84484 ASSAY OF TROPONIN QUANT: CPT | Performed by: EMERGENCY MEDICINE

## 2022-06-18 PROCEDURE — 80053 COMPREHEN METABOLIC PANEL: CPT | Performed by: EMERGENCY MEDICINE

## 2022-06-18 PROCEDURE — 25010000002 ADENOSINE PER 6 MG

## 2022-06-18 PROCEDURE — 36415 COLL VENOUS BLD VENIPUNCTURE: CPT

## 2022-06-18 PROCEDURE — 85025 COMPLETE CBC W/AUTO DIFF WBC: CPT | Performed by: EMERGENCY MEDICINE

## 2022-06-18 RX ORDER — ADENOSINE 3 MG/ML
INJECTION, SOLUTION INTRAVENOUS
Status: COMPLETED
Start: 2022-06-18 | End: 2022-06-18

## 2022-06-18 RX ADMIN — ADENOSINE 6 MG: 3 INJECTION, SOLUTION INTRAVENOUS at 10:30

## 2022-06-18 NOTE — ED PROVIDER NOTES
Subjective   77 year old female presenting with palpitations. She states that about one hour ago had onset of palpitations. She has a history of SVT and this felt similar, she tried vagal maneuvers which were unsuccessful so she came in for evaluation. She denies fevers, nausea, vomiting, chest pain, shortness of breath.          Review of Systems   Constitutional: Negative.    HENT: Negative.    Eyes: Negative.    Respiratory: Negative.    Cardiovascular: Positive for palpitations. Negative for chest pain.   Gastrointestinal: Negative.    Genitourinary: Negative.    Musculoskeletal: Negative.    Skin: Negative.    Neurological: Negative.    Psychiatric/Behavioral: Negative.        Past Medical History:   Diagnosis Date   • Asthma    • Benign hypertension    • Disease of thyroid gland    • Family history of heart disease    • Family history of heart disease    • Hypercholesterolemia    • Rheumatoid arthritis (HCC)    • Rheumatoid arthritis (HCC)    • Supraventricular tachycardia (HCC)     first diagnosed 06/01/2012   • Supraventricular tachycardia (HCC)        No Known Allergies    Past Surgical History:   Procedure Laterality Date   • CARDIAC CATHETERIZATION N/A 7/10/2020    Procedure: Left Heart Cath;  Surgeon: Pankaj Pollard MD;  [facility];  Service: Cardiology;  Laterality: N/A;   • SINUS SURGERY         Family History   Problem Relation Age of Onset   • Heart disease Other    • Hypertension Sister        Social History     Socioeconomic History   • Marital status:    Tobacco Use   • Smoking status: Never Smoker   • Smokeless tobacco: Never Used   Vaping Use   • Vaping Use: Never used   Substance and Sexual Activity   • Alcohol use: No   • Drug use: No   • Sexual activity: Defer           Objective   Physical Exam  Constitutional:       General: She is not in acute distress.     Appearance: Normal appearance. She is not ill-appearing, toxic-appearing or diaphoretic.    HENT:      Head: Normocephalic and atraumatic.      Right Ear: External ear normal.      Left Ear: External ear normal.      Nose: Nose normal.   Eyes:      Extraocular Movements: Extraocular movements intact.   Cardiovascular:      Pulses: Normal pulses.      Heart sounds: Normal heart sounds.      Comments: Rapid regular tachycardia   Pulmonary:      Effort: Pulmonary effort is normal. No respiratory distress.      Breath sounds: Normal breath sounds.   Abdominal:      General: Bowel sounds are normal. There is no distension.      Tenderness: There is no abdominal tenderness.   Musculoskeletal:         General: No swelling, tenderness or deformity. Normal range of motion.      Cervical back: Normal range of motion.   Skin:     General: Skin is warm and dry.      Capillary Refill: Capillary refill takes less than 2 seconds.      Findings: No rash.   Neurological:      General: No focal deficit present.      Mental Status: She is alert and oriented to person, place, and time.   Psychiatric:         Mood and Affect: Mood normal.         Behavior: Behavior normal.         Procedures           ED Course                                                 MDM  Number of Diagnoses or Management Options  SVT (supraventricular tachycardia) (HCC)  Diagnosis management comments: 77 year old female with palpitations. Well developed, well nourished elderly lady in no distress with exam as above. She appears to be in SVT. She notes having had adenosine multiple times in the past. She was given 6mg adenosine here and converted to sinus rhythm. Will continue to monitor and check basic labs. Disposition is to home.    DDx: SVT    EKG interpreted by me: SVT rate 160, non specific ST/T changes, this is an abnormal EKG     Repeat EKG interpreted by me: sinus rhythm, normal rate, RV conduction delay, non specific T wave changes, this is an abnormal EKG    Lab work is without significant abnormality.  She has remained in a sinus rhythm.   Advised close outpatient follow-up with her cardiologist.  She is happy with this plan.       Amount and/or Complexity of Data Reviewed  Decide to obtain previous medical records or to obtain history from someone other than the patient: yes        Final diagnoses:   SVT (supraventricular tachycardia) (HCC)          Pankaj Turner MD  06/18/22 [id number]

## 2022-06-18 NOTE — ED NOTES
When pt arrived she was dizzy she had pain in her jaw that was a 4 out of 10 tried vagal maneuver with a 24g Angiocath and  She didn't convert and she gave 6mg of adenosine through the 18g and she converted and is now at a rate of 84 bpm

## 2022-07-12 ENCOUNTER — TRANSCRIBE ORDERS (OUTPATIENT)
Dept: ADMINISTRATIVE | Facility: HOSPITAL | Age: 77
End: 2022-07-12

## 2022-07-12 DIAGNOSIS — Z13.820 OSTEOPOROSIS SCREENING: ICD-10-CM

## 2022-07-12 DIAGNOSIS — N95.8 POSTARTIFICIAL MENOPAUSAL SYNDROME: Primary | ICD-10-CM

## 2022-07-15 ENCOUNTER — APPOINTMENT (OUTPATIENT)
Dept: BONE DENSITY | Facility: HOSPITAL | Age: 77
End: 2022-07-15

## 2022-08-07 ENCOUNTER — HOSPITAL ENCOUNTER (EMERGENCY)
Facility: HOSPITAL | Age: 77
Discharge: HOME | End: 2022-08-07
Attending: EMERGENCY MEDICINE | Admitting: EMERGENCY MEDICINE

## 2022-08-07 ENCOUNTER — APPOINTMENT (OUTPATIENT)
Dept: GENERAL RADIOLOGY | Facility: HOSPITAL | Age: 77
End: 2022-08-07

## 2022-08-07 VITALS
SYSTOLIC BLOOD PRESSURE: 110 MMHG | DIASTOLIC BLOOD PRESSURE: 63 MMHG | WEIGHT: 140 LBS | TEMPERATURE: 98.4 F | HEART RATE: 68 BPM | OXYGEN SATURATION: 98 % | HEIGHT: 64 IN | BODY MASS INDEX: 23.9 KG/M2 | RESPIRATION RATE: 18 BRPM

## 2022-08-07 DIAGNOSIS — E83.42 HYPOMAGNESEMIA: ICD-10-CM

## 2022-08-07 DIAGNOSIS — E87.1 HYPONATREMIA: ICD-10-CM

## 2022-08-07 DIAGNOSIS — E87.6 HYPOKALEMIA: ICD-10-CM

## 2022-08-07 DIAGNOSIS — E87.8 HYPOCHLOREMIA: ICD-10-CM

## 2022-08-07 DIAGNOSIS — R00.2 PALPITATIONS: Primary | ICD-10-CM

## 2022-08-07 DIAGNOSIS — N39.0 ACUTE UTI: ICD-10-CM

## 2022-08-07 DIAGNOSIS — R42 DIZZINESS: ICD-10-CM

## 2022-08-07 LAB
ALBUMIN SERPL-MCNC: 3.9 G/DL (ref 3.5–5.2)
ALBUMIN/GLOB SERPL: 1.6 G/DL
ALP SERPL-CCNC: 44 U/L (ref 39–117)
ALT SERPL W P-5'-P-CCNC: 22 U/L (ref 1–33)
ANION GAP SERPL CALCULATED.3IONS-SCNC: 14.1 MMOL/L (ref 5–15)
AST SERPL-CCNC: 25 U/L (ref 1–32)
BACTERIA UR QL AUTO: ABNORMAL /HPF
BASOPHILS # BLD AUTO: 0.09 10*3/MM3 (ref 0–0.2)
BASOPHILS NFR BLD AUTO: 1 % (ref 0–1.5)
BILIRUB SERPL-MCNC: 0.4 MG/DL (ref 0–1.2)
BILIRUB UR QL STRIP: NEGATIVE
BUN SERPL-MCNC: 11 MG/DL (ref 8–23)
BUN/CREAT SERPL: 13.3 (ref 7–25)
CALCIUM SPEC-SCNC: 9.6 MG/DL (ref 8.6–10.5)
CHLORIDE SERPL-SCNC: 93 MMOL/L (ref 98–107)
CLARITY UR: CLEAR
CO2 SERPL-SCNC: 26.9 MMOL/L (ref 22–29)
COLOR UR: YELLOW
CREAT SERPL-MCNC: 0.83 MG/DL (ref 0.57–1)
DEPRECATED RDW RBC AUTO: 45.7 FL (ref 37–54)
EGFRCR SERPLBLD CKD-EPI 2021: 73.6 ML/MIN/1.73
EOSINOPHIL # BLD AUTO: 0.21 10*3/MM3 (ref 0–0.4)
EOSINOPHIL NFR BLD AUTO: 2.4 % (ref 0.3–6.2)
ERYTHROCYTE [DISTWIDTH] IN BLOOD BY AUTOMATED COUNT: 14.1 % (ref 12.3–15.4)
GLOBULIN UR ELPH-MCNC: 2.4 GM/DL
GLUCOSE SERPL-MCNC: 101 MG/DL (ref 65–99)
GLUCOSE UR STRIP-MCNC: NEGATIVE MG/DL
HCT VFR BLD AUTO: 35.6 % (ref 34–46.6)
HGB BLD-MCNC: 11.8 G/DL (ref 12–15.9)
HGB UR QL STRIP.AUTO: NEGATIVE
HYALINE CASTS UR QL AUTO: ABNORMAL /LPF
IMM GRANULOCYTES # BLD AUTO: 0.03 10*3/MM3 (ref 0–0.05)
IMM GRANULOCYTES NFR BLD AUTO: 0.3 % (ref 0–0.5)
KETONES UR QL STRIP: NEGATIVE
LEUKOCYTE ESTERASE UR QL STRIP.AUTO: ABNORMAL
LYMPHOCYTES # BLD AUTO: 1.58 10*3/MM3 (ref 0.7–3.1)
LYMPHOCYTES NFR BLD AUTO: 18.4 % (ref 19.6–45.3)
MAGNESIUM SERPL-MCNC: 1.3 MG/DL (ref 1.6–2.4)
MCH RBC QN AUTO: 29.5 PG (ref 26.6–33)
MCHC RBC AUTO-ENTMCNC: 33.1 G/DL (ref 31.5–35.7)
MCV RBC AUTO: 89 FL (ref 79–97)
MONOCYTES # BLD AUTO: 0.89 10*3/MM3 (ref 0.1–0.9)
MONOCYTES NFR BLD AUTO: 10.4 % (ref 5–12)
NEUTROPHILS NFR BLD AUTO: 5.79 10*3/MM3 (ref 1.7–7)
NEUTROPHILS NFR BLD AUTO: 67.5 % (ref 42.7–76)
NITRITE UR QL STRIP: NEGATIVE
NRBC BLD AUTO-RTO: 0 /100 WBC (ref 0–0.2)
PH UR STRIP.AUTO: 7 [PH] (ref 5–8)
PLATELET # BLD AUTO: 213 10*3/MM3 (ref 140–450)
PMV BLD AUTO: 10.6 FL (ref 6–12)
POTASSIUM SERPL-SCNC: 3.2 MMOL/L (ref 3.5–5.2)
PROT SERPL-MCNC: 6.3 G/DL (ref 6–8.5)
PROT UR QL STRIP: NEGATIVE
RBC # BLD AUTO: 4 10*6/MM3 (ref 3.77–5.28)
RBC # UR STRIP: ABNORMAL /HPF
REF LAB TEST METHOD: ABNORMAL
SODIUM SERPL-SCNC: 134 MMOL/L (ref 136–145)
SP GR UR STRIP: 1.01 (ref 1–1.03)
SQUAMOUS #/AREA URNS HPF: ABNORMAL /HPF
TROPONIN T SERPL-MCNC: <0.01 NG/ML (ref 0–0.03)
UROBILINOGEN UR QL STRIP: ABNORMAL
WBC # UR STRIP: ABNORMAL /HPF
WBC NRBC COR # BLD: 8.59 10*3/MM3 (ref 3.4–10.8)

## 2022-08-07 PROCEDURE — 80053 COMPREHEN METABOLIC PANEL: CPT | Performed by: PHYSICIAN ASSISTANT

## 2022-08-07 PROCEDURE — 25010000002 MAGNESIUM SULFATE 2 GM/50ML SOLUTION: Performed by: PHYSICIAN ASSISTANT

## 2022-08-07 PROCEDURE — 81001 URINALYSIS AUTO W/SCOPE: CPT | Performed by: PHYSICIAN ASSISTANT

## 2022-08-07 PROCEDURE — 93005 ELECTROCARDIOGRAM TRACING: CPT | Performed by: PHYSICIAN ASSISTANT

## 2022-08-07 PROCEDURE — 83735 ASSAY OF MAGNESIUM: CPT | Performed by: PHYSICIAN ASSISTANT

## 2022-08-07 PROCEDURE — 96365 THER/PROPH/DIAG IV INF INIT: CPT

## 2022-08-07 PROCEDURE — 87077 CULTURE AEROBIC IDENTIFY: CPT | Performed by: PHYSICIAN ASSISTANT

## 2022-08-07 PROCEDURE — 84484 ASSAY OF TROPONIN QUANT: CPT | Performed by: PHYSICIAN ASSISTANT

## 2022-08-07 PROCEDURE — 87086 URINE CULTURE/COLONY COUNT: CPT | Performed by: PHYSICIAN ASSISTANT

## 2022-08-07 PROCEDURE — 96367 TX/PROPH/DG ADDL SEQ IV INF: CPT

## 2022-08-07 PROCEDURE — 99283 EMERGENCY DEPT VISIT LOW MDM: CPT

## 2022-08-07 PROCEDURE — 85025 COMPLETE CBC W/AUTO DIFF WBC: CPT | Performed by: PHYSICIAN ASSISTANT

## 2022-08-07 PROCEDURE — 25010000002 CEFTRIAXONE SODIUM-DEXTROSE 1-3.74 GM-%(50ML) RECONSTITUTED SOLUTION: Performed by: PHYSICIAN ASSISTANT

## 2022-08-07 PROCEDURE — 71045 X-RAY EXAM CHEST 1 VIEW: CPT

## 2022-08-07 PROCEDURE — 36415 COLL VENOUS BLD VENIPUNCTURE: CPT

## 2022-08-07 PROCEDURE — 87186 SC STD MICRODIL/AGAR DIL: CPT | Performed by: PHYSICIAN ASSISTANT

## 2022-08-07 RX ORDER — CEFTRIAXONE 1 G/50ML
1 INJECTION, SOLUTION INTRAVENOUS ONCE
Status: COMPLETED | OUTPATIENT
Start: 2022-08-07 | End: 2022-08-07

## 2022-08-07 RX ORDER — MAGNESIUM SULFATE HEPTAHYDRATE 40 MG/ML
2 INJECTION, SOLUTION INTRAVENOUS ONCE
Status: COMPLETED | OUTPATIENT
Start: 2022-08-07 | End: 2022-08-07

## 2022-08-07 RX ORDER — POTASSIUM CHLORIDE 750 MG/1
40 CAPSULE, EXTENDED RELEASE ORAL ONCE
Status: COMPLETED | OUTPATIENT
Start: 2022-08-07 | End: 2022-08-07

## 2022-08-07 RX ORDER — CEFDINIR 300 MG/1
300 CAPSULE ORAL 2 TIMES DAILY
Qty: 10 CAPSULE | Refills: 0 | Status: SHIPPED | OUTPATIENT
Start: 2022-08-07 | End: 2022-08-12

## 2022-08-07 RX ADMIN — SODIUM CHLORIDE 1000 ML: 9 INJECTION, SOLUTION INTRAVENOUS at 11:49

## 2022-08-07 RX ADMIN — POTASSIUM CHLORIDE 40 MEQ: 10 CAPSULE, COATED, EXTENDED RELEASE ORAL at 11:50

## 2022-08-07 RX ADMIN — MAGNESIUM SULFATE HEPTAHYDRATE 2 G: 40 INJECTION, SOLUTION INTRAVENOUS at 12:51

## 2022-08-07 RX ADMIN — CEFTRIAXONE 1 G: 1 INJECTION, SOLUTION INTRAVENOUS at 11:49

## 2022-08-07 NOTE — ED PROVIDER NOTES
Subjective   History of Present Illness  Patient is a 77-year-old female with history of asthma, hypertension, thyroid disease, hypercholesterolemia, rheumatoid arthritis, SVT presenting to the ER with complaints of an episode of dizziness and palpitations at home.  Patient states that she was in the bathroom and slipped and almost fell.  She states that she felt as though she might pass out but never did lose consciousness.  She states that she has paroxysmal SVT and follows with cardiology regarding this.  She states that they have discussed doing an ablation.  However, she states they have been slow to set this up and told her that sometimes it will work and sometimes it well.  She states that she sat on the toilet and bear down and put a cold washcloth on her head and she states that now she feels back to normal.  She denies any acute symptoms at this time.  Patient was able to walk to the bathroom without any difficulty.  She denies chest pain and SOA.    Review of Systems   Cardiovascular: Positive for palpitations.   Neurological: Positive for dizziness.        Near syncope   All other systems reviewed and are negative.      Past Medical History:   Diagnosis Date   • Asthma    • Benign hypertension    • Disease of thyroid gland    • Family history of heart disease    • Family history of heart disease    • Hypercholesterolemia    • Rheumatoid arthritis (HCC)    • Rheumatoid arthritis (HCC)    • Supraventricular tachycardia (HCC)     first diagnosed 06/01/2012   • Supraventricular tachycardia (HCC)        No Known Allergies    Past Surgical History:   Procedure Laterality Date   • CARDIAC CATHETERIZATION N/A 7/10/2020    Procedure: Left Heart Cath;  Surgeon: Pankaj Pollard MD;  Location: [facility] CATH INVASIVE LOCATION;  Service: Cardiology;  Laterality: N/A;   • SINUS SURGERY         Family History   Problem Relation Age of Onset   • Heart disease Other    • Hypertension Sister        Social History      Socioeconomic History   • Marital status:    Tobacco Use   • Smoking status: Never Smoker   • Smokeless tobacco: Never Used   Vaping Use   • Vaping Use: Never used   Substance and Sexual Activity   • Alcohol use: No   • Drug use: No   • Sexual activity: Defer           Objective   Physical Exam  Vitals and nursing note reviewed.   Constitutional:       General: She is not in acute distress.     Appearance: She is not toxic-appearing.   HENT:      Head: Normocephalic and atraumatic.   Eyes:      Extraocular Movements: Extraocular movements intact.      Pupils: Pupils are equal, round, and reactive to light.   Cardiovascular:      Rate and Rhythm: Normal rate.      Heart sounds: Normal heart sounds.   Pulmonary:      Effort: Pulmonary effort is normal.      Breath sounds: Normal breath sounds.   Abdominal:      Palpations: Abdomen is soft.      Tenderness: There is no abdominal tenderness.   Musculoskeletal:         General: Normal range of motion.      Cervical back: Normal range of motion and neck supple.   Skin:     General: Skin is warm and dry.   Neurological:      General: No focal deficit present.      Mental Status: She is alert and oriented to person, place, and time.      Cranial Nerves: No cranial nerve deficit.      Motor: No weakness.      Comments: No ataxia   Psychiatric:         Mood and Affect: Mood normal.         Behavior: Behavior normal.         Procedures           ED Course  ED Course as of 08/07/22 1333   Sun Aug 07, 2022   1028 EKG interpreted by me reveals sinus rhythm with rate of 88 bpm.  There is low QRS voltage in chest leads.  This is an atypical appearing EKG. [TB]   1126 Narrative & Impression  PROCEDURE: XR CHEST 1 VW-     HISTORY: Palpitations     COMPARISON: None.     FINDINGS: Large hiatal hernia noted. There is evidence of calcified  granulomatous disease. The heart is normal in size. The mediastinum is  unremarkable. The lungs are clear. There is no pneumothorax.   There are  no acute osseous abnormalities.     IMPRESSION:  No acute cardiopulmonary process.     Continued followup is recommended.     This report was signed and finalized on 8/7/2022 11:14 AM by Pio Leung DO.          Specimen Collected: 08/07/22 11:13         [AP]      ED Course User Index  [AP] Katie Lin PA-C  [TB] Valarie Avila MD           Lab Results (last 24 hours)     Procedure Component Value Units Date/Time    CBC & Differential [[id number]]  (Abnormal) Collected: 08/07/22 1049    Specimen: Blood Updated: 08/07/22 1058    Narrative:      The following orders were created for panel order CBC & Differential.  Procedure                               Abnormality         Status                     ---------                               -----------         ------                     CBC Auto Differential[[id number]]        Abnormal            Final result                 Please view results for these tests on the individual orders.    Comprehensive Metabolic Panel [[id number]]  (Abnormal) Collected: 08/07/22 1049    Specimen: Blood Updated: 08/07/22 1117     Glucose 101 mg/dL      BUN 11 mg/dL      Creatinine 0.83 mg/dL      Sodium 134 mmol/L      Potassium 3.2 mmol/L      Chloride 93 mmol/L      CO2 26.9 mmol/L      Calcium 9.6 mg/dL      Total Protein 6.3 g/dL      Albumin 3.90 g/dL      ALT (SGPT) 22 U/L      AST (SGOT) 25 U/L      Alkaline Phosphatase 44 U/L      Total Bilirubin 0.4 mg/dL      Globulin 2.4 gm/dL      A/G Ratio 1.6 g/dL      BUN/Creatinine Ratio 13.3     Anion Gap 14.1 mmol/L      eGFR 73.6 mL/min/1.73      Comment: National Kidney Foundation and American Society of Nephrology (ASN) Task Force recommended calculation based on the Chronic Kidney Disease Epidemiology Collaboration (CKD-EPI) equation refit without adjustment for race.       Narrative:      GFR Normal >60  Chronic Kidney Disease <60  Kidney Failure <15      Troponin [[id number]]  (Normal) Collected: 08/07/22  1049    Specimen: Blood Updated: 08/07/22 1146     Troponin T <0.010 ng/mL     Narrative:      Troponin T Reference Range:  <= 0.03 ng/mL-   Negative for AMI  >0.03 ng/mL-     Abnormal for myocardial necrosis.  Clinicians would have to utilize clinical acumen, EKG, Troponin and serial changes to determine if it is an Acute Myocardial Infarction or myocardial injury due to an underlying chronic condition.       Results may be falsely decreased if patient taking Biotin.      CBC Auto Differential [[id number]]  (Abnormal) Collected: 08/07/22 1049    Specimen: Blood Updated: 08/07/22 1058     WBC 8.59 10*3/mm3      RBC 4.00 10*6/mm3      Hemoglobin 11.8 g/dL      Hematocrit 35.6 %      MCV 89.0 fL      MCH 29.5 pg      MCHC 33.1 g/dL      RDW 14.1 %      RDW-SD 45.7 fl      MPV 10.6 fL      Platelets 213 10*3/mm3      Neutrophil % 67.5 %      Lymphocyte % 18.4 %      Monocyte % 10.4 %      Eosinophil % 2.4 %      Basophil % 1.0 %      Immature Grans % 0.3 %      Neutrophils, Absolute 5.79 10*3/mm3      Lymphocytes, Absolute 1.58 10*3/mm3      Monocytes, Absolute 0.89 10*3/mm3      Eosinophils, Absolute 0.21 10*3/mm3      Basophils, Absolute 0.09 10*3/mm3      Immature Grans, Absolute 0.03 10*3/mm3      nRBC 0.0 /100 WBC     Magnesium [[id number]]  (Abnormal) Collected: 08/07/22 1049    Specimen: Blood Updated: 08/07/22 1138     Magnesium 1.3 mg/dL     Urinalysis With Microscopic If Indicated (No Culture) - Urine, Clean Catch [[id number]]  (Abnormal) Collected: 08/07/22 1053    Specimen: Urine, Clean Catch Updated: 08/07/22 1103     Color, UA Yellow     Appearance, UA Clear     pH, UA 7.0     Specific Gravity, UA 1.007     Glucose, UA Negative     Ketones, UA Negative     Bilirubin, UA Negative     Blood, UA Negative     Protein, UA Negative     Leuk Esterase, UA Small (1+)     Nitrite, UA Negative     Urobilinogen, UA 0.2 E.U./dL    Urinalysis, Microscopic Only - Urine, Clean Catch [[id number]]  (Abnormal) Collected:  08/07/22 1053    Specimen: Urine, Clean Catch Updated: 08/07/22 1106     RBC, UA None Seen /HPF      WBC, UA 3-5 /HPF      Bacteria, UA 1+ /HPF      Squamous Epithelial Cells, UA 0-2 /HPF      Hyaline Casts, UA None Seen /LPF      Methodology Manual Light Microscopy    Urine Culture - Urine, Urine, Clean Catch [[id number]] Collected: 08/07/22 1053    Specimen: Urine, Clean Catch Updated: 08/07/22 1137                                        MDM   Patient was evaluated in the ER for an episode of palpitations and dizziness at home in the bathroom.  Patient reports that she has had SVT before and feels like she had an episode of it earlier.  Symptoms had resolved upon arrival to the ER.  Labs were remarkable for electrolyte deficiencies including hypomagnesemia, hyponatremia, hypokalemia, hypochloremia.  Otherwise, labs were unremarkable.  Urinalysis is concerning for UTI with white blood cells, 1+ bacteria, no squamous epithelial cells.  Urine culture was sent.  Patient was treated in the ER with 1 g IV Rocephin, IV fluids, IV magnesium, and oral potassium replacement. RX sent for Cefdinir. Patient states she is feeling better and is agreeable with plan for discharge.  She states that she has been following with cardiology and they have discussed possibly doing an ablation.  She was advised to follow-up with her PCP and her cardiologist for further outpatient evaluation.  She was advised to drink fluids with some electrolytes and eat a nutritious diet.  Precautions were given for return to the ER for any new or worsening symptoms.    Final diagnoses:   Palpitations   Dizziness   Hypomagnesemia   Hypokalemia   Acute UTI   Hyponatremia   Hypochloremia       ED Disposition  ED Disposition     ED Disposition   Discharge    Condition   Stable    Comment   --             Rolando Jeronimo MD  [address] [place] [address] [place] [id number] [phone]    Schedule an appointment as soon as possible for a visit   in a few days  for recheck of electrolytes and further outpatient evaluation    Cardiologist    Schedule an appointment as soon as possible for a visit   for further outpatient evaluation of palpitations and dizziness    [facility] Emergency Department  [address] [place] [id number] [phone]  Go to   As needed, If symptoms worsen         Medication List      New Prescriptions    cefdinir 300 MG capsule  Commonly known as: OMNICEF  Take 1 capsule by mouth 2 (Two) Times a Day for 5 days.           Where to Get Your Medications      These medications were sent to [id number] - [place], [place] - [address] AT [facility] [address] - [phone] PH - [phone] FX  [address], [facility] [address]    Phone: [phone]   · cefdinir 300 MG capsule          Katie Lin PA-C  08/07/22 1330

## 2022-08-07 NOTE — DISCHARGE INSTRUCTIONS
Drink some Gatorade or Powerade, something with electrolytes.  Eat a nutritious diet.  Drink plenty of fluids to stay hydrated.  Follow-up with your PCP in a few days for reevaluation and recheck of electrolytes.  We will call you if you need a different antibiotic based on urine culture results.  Follow-up with cardiology for further outpatient evaluation of palpitations.  Return to the ER for any new or worsening symptoms or acute concerns.

## 2022-08-09 LAB — BACTERIA SPEC AEROBE CULT: ABNORMAL

## 2022-08-18 ENCOUNTER — HOSPITAL ENCOUNTER (OUTPATIENT)
Dept: MAMMOGRAPHY | Facility: HOSPITAL | Age: 77
Discharge: HOME | End: 2022-08-18

## 2022-08-18 ENCOUNTER — APPOINTMENT (OUTPATIENT)
Dept: BONE DENSITY | Facility: HOSPITAL | Age: 77
End: 2022-08-18

## 2022-08-18 DIAGNOSIS — N95.8 POSTARTIFICIAL MENOPAUSAL SYNDROME: ICD-10-CM

## 2022-08-18 DIAGNOSIS — Z12.31 VISIT FOR SCREENING MAMMOGRAM: ICD-10-CM

## 2022-08-18 PROCEDURE — 77063 BREAST TOMOSYNTHESIS BI: CPT

## 2022-08-18 PROCEDURE — 77080 DXA BONE DENSITY AXIAL: CPT

## 2022-08-18 PROCEDURE — 77067 SCR MAMMO BI INCL CAD: CPT

## 2022-09-09 ENCOUNTER — APPOINTMENT (OUTPATIENT)
Dept: GENERAL RADIOLOGY | Facility: HOSPITAL | Age: 77
End: 2022-09-09

## 2022-09-09 ENCOUNTER — APPOINTMENT (OUTPATIENT)
Dept: CT IMAGING | Facility: HOSPITAL | Age: 77
End: 2022-09-09

## 2022-09-09 ENCOUNTER — HOSPITAL ENCOUNTER (INPATIENT)
Facility: HOSPITAL | Age: 77
LOS: 7 days | Discharge: NURSING, REHAB OR LONG-TERM CARE | End: 2022-09-16
Attending: EMERGENCY MEDICINE | Admitting: INTERNAL MEDICINE

## 2022-09-09 ENCOUNTER — APPOINTMENT (OUTPATIENT)
Dept: MRI IMAGING | Facility: HOSPITAL | Age: 77
End: 2022-09-09

## 2022-09-09 DIAGNOSIS — R41.82 ALTERED MENTAL STATUS, UNSPECIFIED ALTERED MENTAL STATUS TYPE: Primary | ICD-10-CM

## 2022-09-09 DIAGNOSIS — E87.6 HYPOKALEMIA: ICD-10-CM

## 2022-09-09 LAB
ALT SERPL W P-5'-P-CCNC: 34 U/L (ref 1–33)
AMMONIA BLD-SCNC: 16 UMOL/L (ref 11–51)
AMPHET+METHAMPHET UR QL: NEGATIVE
AMPHETAMINES UR QL: NEGATIVE
ANION GAP SERPL CALCULATED.3IONS-SCNC: 21 MMOL/L (ref 5–15)
APTT PPP: 28.8 SECONDS (ref 22–39)
AST SERPL-CCNC: 43 U/L (ref 1–32)
BARBITURATES UR QL SCN: NEGATIVE
BASOPHILS # BLD AUTO: 0.11 10*3/MM3 (ref 0–0.2)
BASOPHILS NFR BLD AUTO: 1.5 % (ref 0–1.5)
BENZODIAZ UR QL SCN: POSITIVE
BILIRUB UR QL STRIP: NEGATIVE
BUN BLDA-MCNC: 11 MG/DL (ref 8–26)
BUN SERPL-MCNC: 11 MG/DL (ref 8–23)
BUN/CREAT SERPL: 13.1 (ref 7–25)
BUPRENORPHINE SERPL-MCNC: NEGATIVE NG/ML
CA-I BLDA-SCNC: 0.73 MMOL/L (ref 1.2–1.32)
CALCIUM SPEC-SCNC: 10.2 MG/DL (ref 8.6–10.5)
CANNABINOIDS SERPL QL: NEGATIVE
CHLORIDE BLDA-SCNC: 88 MMOL/L (ref 98–109)
CHLORIDE SERPL-SCNC: 87 MMOL/L (ref 98–107)
CLARITY UR: CLEAR
CO2 BLDA-SCNC: 29 MMOL/L (ref 24–29)
CO2 SERPL-SCNC: 25 MMOL/L (ref 22–29)
COCAINE UR QL: NEGATIVE
COLOR UR: YELLOW
CREAT BLDA-MCNC: 0.8 MG/DL (ref 0.6–1.3)
CREAT SERPL-MCNC: 0.84 MG/DL (ref 0.57–1)
D-LACTATE SERPL-SCNC: 2.8 MMOL/L (ref 0.5–2)
DEPRECATED RDW RBC AUTO: 44 FL (ref 37–54)
EGFRCR SERPLBLD CKD-EPI 2021: 72.6 ML/MIN/1.73
EGFRCR SERPLBLD CKD-EPI 2021: 76.9 ML/MIN/1.73
EOSINOPHIL # BLD AUTO: 0.04 10*3/MM3 (ref 0–0.4)
EOSINOPHIL NFR BLD AUTO: 0.6 % (ref 0.3–6.2)
ERYTHROCYTE [DISTWIDTH] IN BLOOD BY AUTOMATED COUNT: 14.3 % (ref 12.3–15.4)
FLUAV SUBTYP SPEC NAA+PROBE: NOT DETECTED
FLUBV RNA ISLT QL NAA+PROBE: NOT DETECTED
GLUCOSE BLDC GLUCOMTR-MCNC: 107 MG/DL (ref 70–130)
GLUCOSE SERPL-MCNC: 104 MG/DL (ref 65–99)
GLUCOSE UR STRIP-MCNC: NEGATIVE MG/DL
HCT VFR BLD AUTO: 40 % (ref 34–46.6)
HCT VFR BLDA CALC: 44 % (ref 38–51)
HGB BLD-MCNC: 13.6 G/DL (ref 12–15.9)
HGB BLDA-MCNC: 15 G/DL (ref 12–17)
HGB UR QL STRIP.AUTO: NEGATIVE
HOLD SPECIMEN: NORMAL
IMM GRANULOCYTES # BLD AUTO: 0.02 10*3/MM3 (ref 0–0.05)
IMM GRANULOCYTES NFR BLD AUTO: 0.3 % (ref 0–0.5)
INR PPP: 1 (ref 0.8–1.2)
KETONES UR QL STRIP: ABNORMAL
LEUKOCYTE ESTERASE UR QL STRIP.AUTO: NEGATIVE
LYMPHOCYTES # BLD AUTO: 3.51 10*3/MM3 (ref 0.7–3.1)
LYMPHOCYTES NFR BLD AUTO: 49.4 % (ref 19.6–45.3)
MAGNESIUM SERPL-MCNC: 1.6 MG/DL (ref 1.6–2.4)
MCH RBC QN AUTO: 28.8 PG (ref 26.6–33)
MCHC RBC AUTO-ENTMCNC: 34 G/DL (ref 31.5–35.7)
MCV RBC AUTO: 84.6 FL (ref 79–97)
METHADONE UR QL SCN: NEGATIVE
MONOCYTES # BLD AUTO: 1.19 10*3/MM3 (ref 0.1–0.9)
MONOCYTES NFR BLD AUTO: 16.7 % (ref 5–12)
NEUTROPHILS NFR BLD AUTO: 2.24 10*3/MM3 (ref 1.7–7)
NEUTROPHILS NFR BLD AUTO: 31.5 % (ref 42.7–76)
NITRITE UR QL STRIP: NEGATIVE
NRBC BLD AUTO-RTO: 0 /100 WBC (ref 0–0.2)
OPIATES UR QL: POSITIVE
OXYCODONE UR QL SCN: NEGATIVE
PCP UR QL SCN: NEGATIVE
PH UR STRIP.AUTO: 8 [PH] (ref 5–8)
PLATELET # BLD AUTO: 163 10*3/MM3 (ref 140–450)
PMV BLD AUTO: 10.9 FL (ref 6–12)
POTASSIUM BLDA-SCNC: 2.8 MMOL/L (ref 3.5–4.9)
POTASSIUM SERPL-SCNC: 3.1 MMOL/L (ref 3.5–5.2)
PROPOXYPH UR QL: NEGATIVE
PROT UR QL STRIP: NEGATIVE
PROTHROMBIN TIME: 11.9 SECONDS (ref 12.8–15.2)
RBC # BLD AUTO: 4.73 10*6/MM3 (ref 3.77–5.28)
SARS-COV-2 RNA PNL SPEC NAA+PROBE: NOT DETECTED
SODIUM BLD-SCNC: 130 MMOL/L (ref 138–146)
SODIUM SERPL-SCNC: 133 MMOL/L (ref 136–145)
SP GR UR STRIP: 1.03 (ref 1–1.03)
TRICYCLICS UR QL SCN: NEGATIVE
TROPONIN T SERPL-MCNC: <0.01 NG/ML (ref 0–0.03)
TROPONIN T SERPL-MCNC: <0.01 NG/ML (ref 0–0.03)
TSH SERPL DL<=0.05 MIU/L-ACNC: 2.07 UIU/ML (ref 0.27–4.2)
UROBILINOGEN UR QL STRIP: ABNORMAL
WBC NRBC COR # BLD: 7.11 10*3/MM3 (ref 3.4–10.8)
WHOLE BLOOD HOLD COAG: NORMAL
WHOLE BLOOD HOLD SPECIMEN: NORMAL

## 2022-09-09 PROCEDURE — 70496 CT ANGIOGRAPHY HEAD: CPT

## 2022-09-09 PROCEDURE — 85060 BLOOD SMEAR INTERPRETATION: CPT | Performed by: INTERNAL MEDICINE

## 2022-09-09 PROCEDURE — 84145 PROCALCITONIN (PCT): CPT | Performed by: NURSE PRACTITIONER

## 2022-09-09 PROCEDURE — 84460 ALANINE AMINO (ALT) (SGPT): CPT | Performed by: EMERGENCY MEDICINE

## 2022-09-09 PROCEDURE — 85610 PROTHROMBIN TIME: CPT

## 2022-09-09 PROCEDURE — 93005 ELECTROCARDIOGRAM TRACING: CPT | Performed by: EMERGENCY MEDICINE

## 2022-09-09 PROCEDURE — 85014 HEMATOCRIT: CPT

## 2022-09-09 PROCEDURE — 84484 ASSAY OF TROPONIN QUANT: CPT | Performed by: EMERGENCY MEDICINE

## 2022-09-09 PROCEDURE — 84450 TRANSFERASE (AST) (SGOT): CPT | Performed by: EMERGENCY MEDICINE

## 2022-09-09 PROCEDURE — 82140 ASSAY OF AMMONIA: CPT | Performed by: NURSE PRACTITIONER

## 2022-09-09 PROCEDURE — 80048 BASIC METABOLIC PNL TOTAL CA: CPT | Performed by: INTERNAL MEDICINE

## 2022-09-09 PROCEDURE — 0 IOPAMIDOL PER 1 ML: Performed by: EMERGENCY MEDICINE

## 2022-09-09 PROCEDURE — 85730 THROMBOPLASTIN TIME PARTIAL: CPT | Performed by: EMERGENCY MEDICINE

## 2022-09-09 PROCEDURE — 80047 BASIC METABLC PNL IONIZED CA: CPT

## 2022-09-09 PROCEDURE — 99223 1ST HOSP IP/OBS HIGH 75: CPT | Performed by: INTERNAL MEDICINE

## 2022-09-09 PROCEDURE — 0042T HC CT CEREBRAL PERFUSION W/WO CONTRAST: CPT

## 2022-09-09 PROCEDURE — 81003 URINALYSIS AUTO W/O SCOPE: CPT | Performed by: EMERGENCY MEDICINE

## 2022-09-09 PROCEDURE — 84484 ASSAY OF TROPONIN QUANT: CPT | Performed by: INTERNAL MEDICINE

## 2022-09-09 PROCEDURE — 83605 ASSAY OF LACTIC ACID: CPT | Performed by: NURSE PRACTITIONER

## 2022-09-09 PROCEDURE — 70498 CT ANGIOGRAPHY NECK: CPT

## 2022-09-09 PROCEDURE — 85025 COMPLETE CBC W/AUTO DIFF WBC: CPT | Performed by: EMERGENCY MEDICINE

## 2022-09-09 PROCEDURE — 70551 MRI BRAIN STEM W/O DYE: CPT

## 2022-09-09 PROCEDURE — 4A03X5D MEASUREMENT OF ARTERIAL FLOW, INTRACRANIAL, EXTERNAL APPROACH: ICD-10-PCS | Performed by: RADIOLOGY

## 2022-09-09 PROCEDURE — 80306 DRUG TEST PRSMV INSTRMNT: CPT | Performed by: NURSE PRACTITIONER

## 2022-09-09 PROCEDURE — 71045 X-RAY EXAM CHEST 1 VIEW: CPT

## 2022-09-09 PROCEDURE — P9612 CATHETERIZE FOR URINE SPEC: HCPCS

## 2022-09-09 PROCEDURE — 83735 ASSAY OF MAGNESIUM: CPT | Performed by: EMERGENCY MEDICINE

## 2022-09-09 PROCEDURE — 84443 ASSAY THYROID STIM HORMONE: CPT | Performed by: INTERNAL MEDICINE

## 2022-09-09 PROCEDURE — 99285 EMERGENCY DEPT VISIT HI MDM: CPT

## 2022-09-09 PROCEDURE — 70450 CT HEAD/BRAIN W/O DYE: CPT

## 2022-09-09 PROCEDURE — 87636 SARSCOV2 & INF A&B AMP PRB: CPT | Performed by: INTERNAL MEDICINE

## 2022-09-09 PROCEDURE — 99223 1ST HOSP IP/OBS HIGH 75: CPT | Performed by: NURSE PRACTITIONER

## 2022-09-09 RX ORDER — SODIUM CHLORIDE 0.9 % (FLUSH) 0.9 %
10 SYRINGE (ML) INJECTION AS NEEDED
Status: DISCONTINUED | OUTPATIENT
Start: 2022-09-09 | End: 2022-09-16 | Stop reason: HOSPADM

## 2022-09-09 RX ORDER — ASPIRIN 300 MG/1
300 SUPPOSITORY RECTAL DAILY
Status: DISCONTINUED | OUTPATIENT
Start: 2022-09-09 | End: 2022-09-16 | Stop reason: HOSPADM

## 2022-09-09 RX ORDER — CLOPIDOGREL BISULFATE 75 MG/1
75 TABLET ORAL DAILY
Status: DISCONTINUED | OUTPATIENT
Start: 2022-09-09 | End: 2022-09-09

## 2022-09-09 RX ORDER — ASPIRIN 325 MG
325 TABLET ORAL DAILY
Status: DISCONTINUED | OUTPATIENT
Start: 2022-09-09 | End: 2022-09-16 | Stop reason: HOSPADM

## 2022-09-09 RX ADMIN — IOPAMIDOL 115 ML: 755 INJECTION, SOLUTION INTRAVENOUS at 18:32

## 2022-09-09 RX ADMIN — ASPIRIN 325 MG ORAL TABLET 325 MG: 325 PILL ORAL at 19:52

## 2022-09-09 NOTE — CONSULTS
Stroke Consult Note    Patient Name: Vane Villavicencio   MRN: [id number]  Age: 77 y.o.  Sex: female  : 1945    Primary Care Physician: Rolando Jeronimo MD  Referring Physician:  Dr. Vyas    TIME STROKE TEAM CALLED: 18:10 EST     TIME PATIENT SEEN: 18:15 EST    Handedness: Right  Race:     Chief Complaint/Reason for Consultation: AMS    Subjective .  HPI: Vane Villavicencio is a 77-year-old, , right-handed female with known diagnosis of HTN, HLD, SVT, tremors, chronic back pain, RA, and hypothyroidism who presents with altered mental status.   Patient's  states it was about midday yesterday (unable to identify specific time) when he noticed that she was significantly weaker than usual and seemed to not be making sense.  She was making off-the-wall comments and could not answer simple questions.  She typically ambulates with a cane short distances around the house.  Yesterday she was unable to stand without assistance.  She had an episode of incontinence of urine and feces as she was unable to make it to the bathroom.  He states she is normally alert and oriented x3 and today she is unable to tell us where she is, nor the month or the year.  She is also been having difficulty with word finding as well.  She does have back pain and left hip pain which is chronic; receives treatment from pain clinic.  He tells me that she did recently start a new medication for her arthritis about 2 weeks ago.  Denies visual changes, denies headaches, denies unilateral weakness, denies difficulty with swallowing.  Patient is on aspirin at home, she is no longer taking Plavix    Last Known Normal Date/Time: 2022 AM    Review of Systems   Constitutional: Positive for activity change. Negative for fever.   Eyes: Negative for visual disturbance.   Respiratory: Negative for shortness of breath.    Cardiovascular: Negative for chest pain.   Gastrointestinal: Negative for nausea.   Genitourinary: Negative for  dysuria.   Musculoskeletal: Positive for arthralgias, back pain and gait problem.   Neurological: Positive for tremors, speech difficulty and weakness. Negative for numbness and headaches.   Psychiatric/Behavioral: Positive for confusion.      Past Medical History:   Diagnosis Date   • Asthma    • Benign hypertension    • Disease of thyroid gland    • Family history of heart disease    • Family history of heart disease    • Hypercholesterolemia    • Rheumatoid arthritis (HCC)    • Rheumatoid arthritis (HCC)    • Supraventricular tachycardia (HCC)     first diagnosed 06/01/2012   • Supraventricular tachycardia (HCC)      Past Surgical History:   Procedure Laterality Date   • CARDIAC CATHETERIZATION N/A 7/10/2020    Procedure: Left Heart Cath;  Surgeon: Pankaj Pollard MD;  Location: [facility] CATH INVASIVE LOCATION;  Service: Cardiology;  Laterality: N/A;   • SINUS SURGERY       Family History   Problem Relation Age of Onset   • Heart disease Other    • Hypertension Sister      Social History     Socioeconomic History   • Marital status:    Tobacco Use   • Smoking status: Never Smoker   • Smokeless tobacco: Never Used   Vaping Use   • Vaping Use: Never used   Substance and Sexual Activity   • Alcohol use: No   • Drug use: No   • Sexual activity: Defer     No Known Allergies  Prior to Admission medications    Medication Sig Start Date End Date Taking? Authorizing Provider   Abatacept (ORENCIA) 125 MG/ML solution prefilled syringe Inject  under the skin 1 (one) time per week.    ProviderGustavo MD   amLODIPine (NORVASC) 5 MG tablet Take 5 mg by mouth 2 (two) times a day.    Gustavo Sharif MD   aspirin 81 MG EC tablet Take 81 mg by mouth Daily.    Gustavo Sharif MD   cholecalciferol (VITAMIN D3) 25 MCG (1000 UT) tablet Take 2,000 Units by mouth Daily.    Gustavo Sharif MD   clopidogrel (PLAVIX) 75 MG tablet Take 1 tablet by mouth Daily. 7/10/20   Pankaj Pollard MD   fluticasone  (FLONASE) 50 MCG/ACT nasal spray 2 sprays into each nostril as needed. Administer 2 sprays in each nostril for each dose.     Gustavo hSarif MD   hydroCHLOROthiazide (HYDRODIURIL) 25 MG tablet Take 25 mg by mouth Daily.    Gustavo Sharif MD   leflunomide (ARAVA) 20 MG tablet Take 20 mg by mouth daily.    Gustavo Sharif MD   levothyroxine (SYNTHROID, LEVOTHROID) 75 MCG tablet Take 75 mcg by mouth daily.    Gustavo Sharif MD   LORazepam (ATIVAN) 0.5 MG tablet Take 0.5 mg by mouth every 8 (eight) hours as needed for anxiety.    Gustavo Sharif MD   metoprolol tartrate (LOPRESSOR) 50 MG tablet Take 50 mg by mouth 2 (Two) Times a Day.    Gustavo Sharif MD   Multiple Vitamins-Minerals (WOMENS BONE HEALTH PO) Take 1 tablet by mouth daily.    Gustavo Sharfi MD   potassium chloride (MICRO-K) 10 MEQ CR capsule Take 20 mEq by mouth Daily.    Gustavo Sharif MD   potassium chloride 10 MEQ CR tablet Take 1 tablet by mouth 2 (Two) Times a Day. 5/13/21   Valarie Avila MD   pravastatin (PRAVACHOL) 80 MG tablet Take 80 mg by mouth daily.    Gustavo Sharif MD   predniSONE (DELTASONE) 2.5 MG tablet Take 5 mg by mouth daily.    Gustavo Sharif MD   traMADol (ULTRAM) 50 MG tablet Take 50 mg by mouth Every 6 (Six) Hours As Needed for Moderate Pain .    Gustavo Sharif MD             Objective     Temp:  [98.7 °F (37.1 °C)] 98.7 °F (37.1 °C)  Heart Rate:  [99] 99  Resp:  [18] 18  BP: (141)/(109) 141/109  Neurological Exam  Mental Status  Awake and alert. Oriented only to person. Unable to tell me her age or the month. Speech is normal. Expressive aphasia present. Patient can tell me that she is taking aspirin only and not Plavix.  But yet she is unable to tell me the month, the year, nor her location.  She also has difficulty identifying some objects. Follows one-step commands.    Cranial Nerves  CN II: Visual fields full to confrontation.  CN III, IV, VI:  Extraocular movements intact bilaterally. Normal lids and orbits bilaterally. Pupils equal round and reactive to light bilaterally.  CN V: Facial sensation is normal.  CN VII: Full and symmetric facial movement.  CN IX, X: Palate elevates symmetrically  CN XI: Shoulder shrug strength is normal.  CN XII: Tongue midline without atrophy or fasciculations.    Motor  Decreased muscle bulk throughout. No fasciculations present. Normal muscle tone. The following abnormal movements were seen: Tremors (present at baseline).   Strength is 5/5 in all four extremities except as noted.  BLE 3/5, BUE 4/5.    Sensory  Light touch is normal in upper and lower extremities.     Coordination    No obvious dysmetria.    Gait    Not tested.      Physical Exam  Vitals reviewed.   Constitutional:       General: She is awake.      Appearance: Normal appearance.   HENT:      Head: Normocephalic and atraumatic.   Eyes:      General: Lids are normal.      Extraocular Movements: Extraocular movements intact.      Pupils: Pupils are equal, round, and reactive to light.   Cardiovascular:      Rate and Rhythm: Normal rate.   Pulmonary:      Effort: Pulmonary effort is normal. No respiratory distress.   Abdominal:      General: There is no distension.      Palpations: Abdomen is soft.   Musculoskeletal:         General: No swelling. Normal range of motion.      Cervical back: Normal range of motion and neck supple.   Skin:     General: Skin is warm and dry.   Neurological:      Mental Status: She is alert. She is disoriented.      Motor: Weakness present.   Psychiatric:         Mood and Affect: Mood normal.         Speech: Speech normal.         Acute Stroke Data    IV Thrombolytic (TPA/Tenecteplase) Inclusion / Exclusion Criteria    Time: 18:20 EDT  Person Administering Scale: MIHAI Sanchez    Inclusion Criteria  [x]   18 years of age or greater   []   Onset of symptoms < 4.5 hours before beginning treatment (stroke onset = time patient  was last seen well or without symptoms).   []   Diagnosis of acute ischemic stroke causing measurable disabling deficit (Complete Hemianopia, Any Aphasia, Visual or Sensory Extinction, Any weakness limiting sustained effort against gravity)   []   Any remaining deficit considered potentially disabling in view of patient and practitioner   Exclusion criteria (Do not proceed with Alteplase if any are checked under exclusion criteria)  [x]   Onset unknown or GREATER than 4.5 hours   []   ICH on CT/MRI   []   CT demonstrates hypodensity representing acute or subacute infarct   []   Significant head trauma or prior stroke in the previous 3 months   []   Symptoms suggestive of subarachnoid hemorrhage   []   History of un-ruptured intracranial aneurysm GREATER than 10 mm   []   Recent intracranial or intraspinal surgery within the last 3 months   []   Arterial puncture at a non-compressible site in the previous 7 days   []   Active internal bleeding   []   Acute bleeding tendency   []   Platelet count LESS than 100,000 for known hematological diseases such as leukemia, thrombocytopenia or chronic cirrhosis   []   Current use of anticoagulant with INR GREATER than 1.7 or PT GREATER than 15 seconds, aPTT GREATER than 40 seconds   []   Heparin received within 48 hours, resulting in abnormally elevated aPTT GREATER than upper limit of normal   []   Current use of direct thrombin inhibitors or direct factor Xa inhibitors in the past 48 hours   []   Elevated blood pressure refractory to treatment (systolic GREATER than 185 mm/Hg or diastolic  GREATER than 110 mm/Hg   []   Suspected infective endocarditis and aortic arch dissection   []   Current use of therapeutic treatment dose of low-molecular-weight heparin (LMWH) within the previous 24 hours   []   Structural GI malignancy or bleed   Relative exclusion for all patients  []   Only minor nondisabling symptoms   []   Pregnancy   []   Seizure at onset with postictal residual  neurological impairments   []   Major surgery or previous trauma within past 14 days   []   History of previous spontaneous ICH, intracranial neoplasm, or AV malformation   []   Postpartum (within previous 14 days)   []   Recent GI or urinary tract hemorrhage (within previous 21 days)   []   Recent acute MI (within previous 3 months)   []   History of unruptured intracranial aneurysm LESS than 10 mm   []   History of ruptured intracranial aneurysm   []   Blood glucose LESS than 50 mg/dL (2.7 mmol/L)   []   Dural puncture within the last 7 days   []   Known GREATER than 10 cerebral microbleeds   Additional exclusions for patients with symptoms onset between 3 and 4.5 hours.  []   Age > 80.   []   On any anticoagulants regardless of INR  >>> Warfarin (Coumadin), Heparin, Enoxaparin (Lovenox), fondaparinux (Arixtra), bivalirudin (Angiomax), Argatroban, dabigatran (Pradaxa), rivaroxaban (Xarelto), or apixaban (Eliquis)   []   Severe stroke (NIHSS > 25).   []   History of BOTH diabetes and previous ischemic stroke.   []   The risks and benefits have been discussed with the patient or family related to the administration of IV alteplase for stroke symptoms.   []   I have discussed and reviewed the patient's case and imaging with the attending prior to IV Thrombolytic (TPA/Tenecteplase).    Time Thrombolytic administered       Hospital Meds:  Scheduled- aspirin, 325 mg, Oral, Daily   Or  aspirin, 300 mg, Rectal, Daily      Infusions-     PRNs- sodium chloride    Functional Status Prior to Current Stroke/[place] Score: MRS 1/2    NIH Stroke Scale  Time: 18:20 EDT  Person Administering Scale: MIHAI Sanchez    1a  Level of consciousness: 0=alert; keenly responsive   1b. LOC questions:  2=Answers neither question correctly   1c. LOC commands: 0=Performs both tasks correctly   2.  Best Gaze: 0=normal   3.  Visual: 0=No visual loss   4. Facial Palsy: 0=Normal symmetric movement   5a.  Motor left arm: 0=No drift, limb  holds 90 (or 45) degrees for full 10 seconds   5b.  Motor right arm: 0=No drift, limb holds 90 (or 45) degrees for full 10 seconds   6a. motor left le=No drift, limb holds 90 (or 45) degrees for full 10 seconds   6b  Motor right le=No drift, limb holds 90 (or 45) degrees for full 10 seconds   7. Limb Ataxia: 0=Absent   8.  Sensory: 0=Normal; no sensory loss   9. Best Language:  1=Mild to moderate aphasia; some obvious loss of fluency or facility of comprehension without significant limitation on ideas expressed or form of expression.   10. Dysarthria: 0=Normal   11. Extinction and Inattention: 0=No abnormality    Total:   3       Results Reviewed:  I have personally reviewed current lab, radiology, and data and agree with results.  WBC   Date Value Ref Range Status   2022 7.11 3.40 - 10.80 10*3/mm3 Final     RBC   Date Value Ref Range Status   2022 4.73 3.77 - 5.28 10*6/mm3 Final     Hemoglobin   Date Value Ref Range Status   2022 15.0 12.0 - 17.0 g/dL Final     Comment:     Serial Number: [id number]:  [id number]   2022 13.6 12.0 - 15.9 g/dL Final     Hematocrit   Date Value Ref Range Status   2022 44 38 - 51 % Final   2022 40.0 34.0 - 46.6 % Final     MCV   Date Value Ref Range Status   2022 84.6 79.0 - 97.0 fL Final     MCH   Date Value Ref Range Status   2022 28.8 26.6 - 33.0 pg Final     MCHC   Date Value Ref Range Status   2022 34.0 31.5 - 35.7 g/dL Final     RDW   Date Value Ref Range Status   2022 14.3 12.3 - 15.4 % Final     RDW-SD   Date Value Ref Range Status   2022 44.0 37.0 - 54.0 fl Final     MPV   Date Value Ref Range Status   2022 10.9 6.0 - 12.0 fL Final     Platelets   Date Value Ref Range Status   2022 163 140 - 450 10*3/mm3 Final     Neutrophil %   Date Value Ref Range Status   2022 31.5 (L) 42.7 - 76.0 % Final     Lymphocyte %   Date Value Ref Range Status   2022 49.4 (H) 19.6 - 45.3 % Final      Monocyte %   Date Value Ref Range Status   09/09/2022 16.7 (H) 5.0 - 12.0 % Final     Eosinophil %   Date Value Ref Range Status   09/09/2022 0.6 0.3 - 6.2 % Final     Basophil %   Date Value Ref Range Status   09/09/2022 1.5 0.0 - 1.5 % Final     Immature Grans %   Date Value Ref Range Status   09/09/2022 0.3 0.0 - 0.5 % Final     Neutrophils, Absolute   Date Value Ref Range Status   09/09/2022 2.24 1.70 - 7.00 10*3/mm3 Final     Lymphocytes, Absolute   Date Value Ref Range Status   09/09/2022 3.51 (H) 0.70 - 3.10 10*3/mm3 Final     Monocytes, Absolute   Date Value Ref Range Status   09/09/2022 1.19 (H) 0.10 - 0.90 10*3/mm3 Final     Eosinophils, Absolute   Date Value Ref Range Status   09/09/2022 0.04 0.00 - 0.40 10*3/mm3 Final     Basophils, Absolute   Date Value Ref Range Status   09/09/2022 0.11 0.00 - 0.20 10*3/mm3 Final     Immature Grans, Absolute   Date Value Ref Range Status   09/09/2022 0.02 0.00 - 0.05 10*3/mm3 Final     nRBC   Date Value Ref Range Status   09/09/2022 0.0 0.0 - 0.2 /100 WBC Final     Lab Results   Component Value Date    GLUCOSE 101 (H) 08/07/2022    BUN 11 08/07/2022    CREATININE 0.80 09/09/2022    EGFRIFNONA 43 (L) 08/19/2021    BCR 13.3 08/07/2022    K 3.2 (L) 08/07/2022    CO2 26.9 08/07/2022    CALCIUM 9.6 08/07/2022    ALBUMIN 3.90 08/07/2022    AST 43 (H) 09/09/2022    ALT 34 (H) 09/09/2022     CT Angiogram Neck    Result Date: 9/9/2022   1. Minimal atherosclerotic plaque without evidence of stenosis, occlusion, aneurysm or dissection in the neck or the head. 2. Cervical degenerative changes. 3. 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  This report was finalized on 9/9/2022 6:57 PM by Omar Hughes MD.      CT Head Without Contrast Stroke Protocol    Result Date: 9/9/2022   1. No acute intracranial abnormality. 2. Mild chronic small vessel ischemic change. 3. Severe obstructive right-sided paranasal sinus mucosal disease.  This report was  finalized on 9/9/2022 6:14 PM by Omar Hughes MD.      CT Angiogram Head w AI Analysis of LVO    Result Date: 9/9/2022   1. Minimal atherosclerotic plaque without evidence of stenosis, occlusion, aneurysm or dissection in the neck or the head. 2. Cervical degenerative changes. 3. 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  This report was finalized on 9/9/2022 6:57 PM by Omar Hughes MD.      CT CEREBRAL PERFUSION WITH & WITHOUT CONTRAST    Result Date: 9/9/2022  No perfusion abnormalities in the brain. No evidence of brain risk or cortical infarct.  This report was finalized on 9/9/2022 6:46 PM by Omar Hughes MD.            Assessment/Plan:  77-year-old, , right-handed female with known diagnosis of HTN, HLD, SVT, tremorschronic back pain, RA, and hypothyroidism who presents with altered mental status and generalized weakness which began sometime yesterday.  NIHSS 3, /109.    PTA antiplatelets: Aspirin  PT A anticoagulation: None        1. AMS -wide list of differentials including CVA v infection v metabolic encephalopathy  -TIA/ischemic stroke order set without thrombolytic therapy  -CT head is negative for acute abnormality  -CT perfusion is negative  -CTA head and neck negative for hemodynamically significant flow-limiting stenosis, no LVO, no aneurysm.  There is severe chronic obstructive sinus disease  -MRI brain without  -Consider EEG in am  -Check A1c, ammonia, vitamin B12, folate  -UA pending  -UDS  -PT/OT/SLP eval  -Activity as tolerated  -Cardiac diet if passes bedside stroke dysphagia screen  -Stroke neurology will follow    2.  HTN  -141/109 on arrival  -Echo with bubble study  -Allow autoregulation of blood pressure, SBP <220    3.  HLD  -Lipid panel  -Atorvastatin 80 mg daily      Plan of care was discussed with patient, family, and Dr. Vyas.  Stroke neurology will continue to follow    MIHAI Sanchez  September 9, 2022  19:06 EDT

## 2022-09-09 NOTE — ED PROVIDER NOTES
Subjective   PIT    Patient is a pleasant 77-year-old female who presents with altered mental status.  Reportedly, at midday yesterday, she became confused and disoriented.  She developed a generalized tremor and the symptoms have persisted overnight resulting in her emergency department visit today.  Patient is able to answer many questions but is also more confused than usual.  She also developed a significant tremor over the same time and complains of significant generalized weakness..  She denies any pain associated with these problems.  Denies fever, chills, chest pain, shortness of breath, abdominal pain, vomiting, diarrhea, or other acute complaints.          Review of Systems   All other systems reviewed and are negative.      Past Medical History:   Diagnosis Date   • Asthma    • Benign hypertension    • Disease of thyroid gland    • Family history of heart disease    • Family history of heart disease    • Hypercholesterolemia    • Rheumatoid arthritis (HCC)    • Rheumatoid arthritis (HCC)    • Supraventricular tachycardia (HCC)     first diagnosed 06/01/2012   • Supraventricular tachycardia (HCC)        No Known Allergies    Past Surgical History:   Procedure Laterality Date   • CARDIAC CATHETERIZATION N/A 7/10/2020    Procedure: Left Heart Cath;  Surgeon: Pankaj Pollard MD;  Location: [facility] CATH INVASIVE LOCATION;  Service: Cardiology;  Laterality: N/A;   • SINUS SURGERY         Family History   Problem Relation Age of Onset   • Heart disease Other    • Hypertension Sister        Social History     Socioeconomic History   • Marital status:    Tobacco Use   • Smoking status: Never Smoker   • Smokeless tobacco: Never Used   Vaping Use   • Vaping Use: Never used   Substance and Sexual Activity   • Alcohol use: No   • Drug use: No   • Sexual activity: Defer           Objective   Physical Exam  Constitutional:       Appearance: Normal appearance.   HENT:      Head: Normocephalic.   Eyes:       Extraocular Movements: Extraocular movements intact.      Pupils: Pupils are equal, round, and reactive to light.   Cardiovascular:      Rate and Rhythm: Normal rate and regular rhythm.      Pulses: Normal pulses.      Heart sounds: Normal heart sounds.   Pulmonary:      Effort: Pulmonary effort is normal. No respiratory distress.      Breath sounds: Normal breath sounds.   Abdominal:      General: Bowel sounds are normal. There is no distension.      Palpations: Abdomen is soft.      Tenderness: There is no abdominal tenderness.   Musculoskeletal:         General: Normal range of motion.      Cervical back: Normal range of motion.   Skin:     General: Skin is warm and dry.      Capillary Refill: Capillary refill takes less than 2 seconds.   Neurological:      General: No focal deficit present.      Mental Status: She is alert. She is disoriented.      Comments: Generalized weakness.  Moderate generalized tremor.   Psychiatric:         Behavior: Behavior normal.         Procedures           ED Course      Recent Results (from the past 24 hour(s))   Troponin    Collection Time: 09/09/22  6:17 PM    Specimen: Blood   Result Value Ref Range    Troponin T <0.010 0.000 - 0.030 ng/mL   aPTT    Collection Time: 09/09/22  6:17 PM    Specimen: Blood   Result Value Ref Range    PTT 28.8 22.0 - 39.0 seconds   AST    Collection Time: 09/09/22  6:17 PM    Specimen: Blood   Result Value Ref Range    AST (SGOT) 43 (H) 1 - 32 U/L   ALT    Collection Time: 09/09/22  6:17 PM    Specimen: Blood   Result Value Ref Range    ALT (SGPT) 34 (H) 1 - 33 U/L   Green Top (Gel)    Collection Time: 09/09/22  6:17 PM   Result Value Ref Range    Extra Tube Hold for add-ons.    Lavender Top    Collection Time: 09/09/22  6:17 PM   Result Value Ref Range    Extra Tube hold for add-on    Gold Top - SST    Collection Time: 09/09/22  6:17 PM   Result Value Ref Range    Extra Tube Hold for add-ons.    Gray Top    Collection Time: 09/09/22  6:17 PM    Result Value Ref Range    Extra Tube Hold for add-ons.    Light Blue Top    Collection Time: 09/09/22  6:17 PM   Result Value Ref Range    Extra Tube Hold for add-ons.    CBC Auto Differential    Collection Time: 09/09/22  6:17 PM    Specimen: Blood   Result Value Ref Range    WBC 7.11 3.40 - 10.80 10*3/mm3    RBC 4.73 3.77 - 5.28 10*6/mm3    Hemoglobin 13.6 12.0 - 15.9 g/dL    Hematocrit 40.0 34.0 - 46.6 %    MCV 84.6 79.0 - 97.0 fL    MCH 28.8 26.6 - 33.0 pg    MCHC 34.0 31.5 - 35.7 g/dL    RDW 14.3 12.3 - 15.4 %    RDW-SD 44.0 37.0 - 54.0 fl    MPV 10.9 6.0 - 12.0 fL    Platelets 163 140 - 450 10*3/mm3    Neutrophil % 31.5 (L) 42.7 - 76.0 %    Lymphocyte % 49.4 (H) 19.6 - 45.3 %    Monocyte % 16.7 (H) 5.0 - 12.0 %    Eosinophil % 0.6 0.3 - 6.2 %    Basophil % 1.5 0.0 - 1.5 %    Immature Grans % 0.3 0.0 - 0.5 %    Neutrophils, Absolute 2.24 1.70 - 7.00 10*3/mm3    Lymphocytes, Absolute 3.51 (H) 0.70 - 3.10 10*3/mm3    Monocytes, Absolute 1.19 (H) 0.10 - 0.90 10*3/mm3    Eosinophils, Absolute 0.04 0.00 - 0.40 10*3/mm3    Basophils, Absolute 0.11 0.00 - 0.20 10*3/mm3    Immature Grans, Absolute 0.02 0.00 - 0.05 10*3/mm3    nRBC 0.0 0.0 - 0.2 /100 WBC   Magnesium    Collection Time: 09/09/22  6:17 PM    Specimen: Blood   Result Value Ref Range    Magnesium 1.6 1.6 - 2.4 mg/dL   Basic Metabolic Panel    Collection Time: 09/09/22  6:17 PM    Specimen: Blood   Result Value Ref Range    Glucose 104 (H) 65 - 99 mg/dL    BUN 11 8 - 23 mg/dL    Creatinine 0.84 0.57 - 1.00 mg/dL    Sodium 133 (L) 136 - 145 mmol/L    Potassium 3.1 (L) 3.5 - 5.2 mmol/L    Chloride 87 (L) 98 - 107 mmol/L    CO2 25.0 22.0 - 29.0 mmol/L    Calcium 10.2 8.6 - 10.5 mg/dL    BUN/Creatinine Ratio 13.1 7.0 - 25.0    Anion Gap 21.0 (H) 5.0 - 15.0 mmol/L    eGFR 72.6 >60.0 mL/min/1.73   Peripheral Blood Smear    Collection Time: 09/09/22  6:17 PM    Specimen: Blood   Result Value Ref Range    Performed by: Dr. Marsha Blevins M.D.       Pathologist  Interpretation       Mild normocytic anemia. Platelets within normal limit.    TSH    Collection Time: 09/09/22  6:17 PM    Specimen: Blood   Result Value Ref Range    TSH 2.070 0.270 - 4.200 uIU/mL   POC CHEM 8    Collection Time: 09/09/22  6:18 PM    Specimen: Blood   Result Value Ref Range    Glucose 107 70 - 130 mg/dL    BUN 11 8 - 26 mg/dL    Creatinine 0.80 0.60 - 1.30 mg/dL    Sodium 130 (L) 138 - 146 mmol/L    POC Potassium 2.8 (L) 3.5 - 4.9 mmol/L    Chloride 88 (L) 98 - 109 mmol/L    Total CO2 29 24 - 29 mmol/L    Hemoglobin 15.0 12.0 - 17.0 g/dL    Hematocrit 44 38 - 51 %    Ionized Calcium 0.73 (L) 1.20 - 1.32 mmol/L    eGFR 76.9 >60.0 mL/min/1.73   POC Protime / INR    Collection Time: 09/09/22  6:18 PM    Specimen: Blood   Result Value Ref Range    Protime 11.9 (L) 12.8 - 15.2 seconds    INR 1.0 0.8 - 1.2   Urinalysis With Culture If Indicated - Urine, Catheter In/Out    Collection Time: 09/09/22  7:30 PM    Specimen: Urine, Catheter In/Out   Result Value Ref Range    Color, UA Yellow Yellow, Straw    Appearance, UA Clear Clear    pH, UA 8.0 5.0 - 8.0    Specific Gravity, UA 1.035 (H) 1.001 - 1.030    Glucose, UA Negative Negative    Ketones, UA Trace (A) Negative    Bilirubin, UA Negative Negative    Blood, UA Negative Negative    Protein, UA Negative Negative    Leuk Esterase, UA Negative Negative    Nitrite, UA Negative Negative    Urobilinogen, UA 0.2 E.U./dL 0.2 - 1.0 E.U./dL   Urine Drug Screen - Urine, Clean Catch    Collection Time: 09/09/22  7:33 PM    Specimen: Urine, Clean Catch   Result Value Ref Range    THC, Screen, Urine Negative Negative    Phencyclidine (PCP), Urine Negative Negative    Cocaine Screen, Urine Negative Negative    Methamphetamine, Ur Negative Negative    Opiate Screen Positive (A) Negative    Amphetamine Screen, Urine Negative Negative    Benzodiazepine Screen, Urine Positive (A) Negative    Tricyclic Antidepressants Screen Negative Negative    Methadone Screen, Urine  Negative Negative    Barbiturates Screen, Urine Negative Negative    Oxycodone Screen, Urine Negative Negative    Propoxyphene Screen Negative Negative    Buprenorphine, Screen, Urine Negative Negative   ECG 12 Lead    Collection Time: 09/09/22  7:42 PM   Result Value Ref Range    QT Interval 366 ms    QTC Interval 440 ms   Ammonia    Collection Time: 09/09/22  7:46 PM    Specimen: Arm, Right; Blood   Result Value Ref Range    Ammonia 16 11 - 51 umol/L   COVID-19 and FLU A/B PCR - Swab, Nasopharynx    Collection Time: 09/09/22  9:15 PM    Specimen: Nasopharynx; Swab   Result Value Ref Range    COVID19 Not Detected Not Detected - Ref. Range    Influenza A PCR Not Detected Not Detected    Influenza B PCR Not Detected Not Detected   Troponin    Collection Time: 09/09/22  9:16 PM    Specimen: Arm, Left; Blood   Result Value Ref Range    Troponin T <0.010 0.000 - 0.030 ng/mL   Procalcitonin    Collection Time: 09/09/22  9:16 PM    Specimen: Arm, Left; Blood   Result Value Ref Range    Procalcitonin 0.03 0.00 - 0.25 ng/mL   Lactic Acid, Plasma    Collection Time: 09/09/22 11:25 PM    Specimen: Blood   Result Value Ref Range    Lactate 2.8 (C) 0.5 - 2.0 mmol/L   POC Glucose Once    Collection Time: 09/10/22 12:47 AM    Specimen: Blood   Result Value Ref Range    Glucose 103 70 - 130 mg/dL   Hemoglobin A1c    Collection Time: 09/10/22  4:37 AM    Specimen: Blood   Result Value Ref Range    Hemoglobin A1C 5.90 (H) 4.80 - 5.60 %   Lipid Panel    Collection Time: 09/10/22  4:37 AM    Specimen: Blood   Result Value Ref Range    Total Cholesterol 228 (H) 0 - 200 mg/dL    Triglycerides 225 (H) 0 - 150 mg/dL    HDL Cholesterol 57 40 - 60 mg/dL    LDL Cholesterol  131 (H) 0 - 100 mg/dL    VLDL Cholesterol 40 5 - 40 mg/dL    LDL/HDL Ratio 2.21    Folate    Collection Time: 09/10/22  4:37 AM    Specimen: Blood   Result Value Ref Range    Folate 11.50 4.78 - 24.20 ng/mL   Vitamin B12    Collection Time: 09/10/22  4:37 AM    Specimen:  Blood   Result Value Ref Range    Vitamin B-12 1,143 (H) 211 - 946 pg/mL   CBC Auto Differential    Collection Time: 09/10/22  4:37 AM    Specimen: Blood   Result Value Ref Range    WBC 5.91 3.40 - 10.80 10*3/mm3    RBC 4.17 3.77 - 5.28 10*6/mm3    Hemoglobin 11.9 (L) 12.0 - 15.9 g/dL    Hematocrit 34.9 34.0 - 46.6 %    MCV 83.7 79.0 - 97.0 fL    MCH 28.5 26.6 - 33.0 pg    MCHC 34.1 31.5 - 35.7 g/dL    RDW 14.4 12.3 - 15.4 %    RDW-SD 43.6 37.0 - 54.0 fl    MPV 11.4 6.0 - 12.0 fL    Platelets 151 140 - 450 10*3/mm3   Comprehensive Metabolic Panel    Collection Time: 09/10/22  4:37 AM    Specimen: Blood   Result Value Ref Range    Glucose 106 (H) 65 - 99 mg/dL    BUN 11 8 - 23 mg/dL    Creatinine 0.74 0.57 - 1.00 mg/dL    Sodium 133 (L) 136 - 145 mmol/L    Potassium 2.6 (C) 3.5 - 5.2 mmol/L    Chloride 90 (L) 98 - 107 mmol/L    CO2 29.0 22.0 - 29.0 mmol/L    Calcium 9.3 8.6 - 10.5 mg/dL    Total Protein 6.4 6.0 - 8.5 g/dL    Albumin 3.90 3.50 - 5.20 g/dL    ALT (SGPT) 25 1 - 33 U/L    AST (SGOT) 39 (H) 1 - 32 U/L    Alkaline Phosphatase 35 (L) 39 - 117 U/L    Total Bilirubin 0.6 0.0 - 1.2 mg/dL    Globulin 2.5 gm/dL    A/G Ratio 1.6 g/dL    BUN/Creatinine Ratio 14.9 7.0 - 25.0    Anion Gap 14.0 5.0 - 15.0 mmol/L    eGFR 84.5 >60.0 mL/min/1.73   Magnesium    Collection Time: 09/10/22  4:37 AM    Specimen: Blood   Result Value Ref Range    Magnesium 1.6 1.6 - 2.4 mg/dL   Sodium    Collection Time: 09/10/22  4:37 AM    Specimen: Blood   Result Value Ref Range    Sodium 133 (L) 136 - 145 mmol/L   Lactic Acid, Plasma    Collection Time: 09/10/22  4:37 AM    Specimen: Blood   Result Value Ref Range    Lactate 1.7 0.5 - 2.0 mmol/L   Manual Differential    Collection Time: 09/10/22  4:37 AM    Specimen: Blood   Result Value Ref Range    Neutrophil % 20.0 (L) 42.7 - 76.0 %    Lymphocyte % 59.0 (H) 19.6 - 45.3 %    Monocyte % 15.0 (H) 5.0 - 12.0 %    Eosinophil % 2.0 0.3 - 6.2 %    Basophil % 0.0 0.0 - 1.5 %    Bands %  1.0  0.0 - 5.0 %    Atypical Lymphocyte % 3.0 0.0 - 5.0 %    Neutrophils Absolute 1.24 (L) 1.70 - 7.00 10*3/mm3    Lymphocytes Absolute 3.66 (H) 0.70 - 3.10 10*3/mm3    Monocytes Absolute 0.89 0.10 - 0.90 10*3/mm3    Eosinophils Absolute 0.12 0.00 - 0.40 10*3/mm3    Basophils Absolute 0.00 0.00 - 0.20 10*3/mm3    RBC Morphology Normal Normal    WBC Morphology Normal Normal    Platelet Morphology Normal Normal   Respiratory Panel PCR w/COVID-19(SARS-CoV-2) [facility] In-House, NP Swab in UTM/VTM, 3-4 HR TAT - Swab, Nasopharynx    Collection Time: 09/10/22  5:06 AM    Specimen: Nasopharynx; Swab   Result Value Ref Range    ADENOVIRUS, PCR Not Detected Not Detected    Coronavirus 229E Not Detected Not Detected    Coronavirus HKU1 Not Detected Not Detected    Coronavirus NL63 Not Detected Not Detected    Coronavirus OC43 Not Detected Not Detected    COVID19 Not Detected Not Detected - Ref. Range    Human Metapneumovirus Not Detected Not Detected    Human Rhinovirus/Enterovirus Not Detected Not Detected    Influenza A PCR Not Detected Not Detected    Influenza B PCR Not Detected Not Detected    Parainfluenza Virus 1 Not Detected Not Detected    Parainfluenza Virus 2 Not Detected Not Detected    Parainfluenza Virus 3 Not Detected Not Detected    Parainfluenza Virus 4 Not Detected Not Detected    RSV, PCR Not Detected Not Detected    Bordetella pertussis pcr Not Detected Not Detected    Bordetella parapertussis PCR Not Detected Not Detected    Chlamydophila pneumoniae PCR Not Detected Not Detected    Mycoplasma pneumo by PCR Not Detected Not Detected   POC Glucose Once    Collection Time: 09/10/22  6:09 AM    Specimen: Blood   Result Value Ref Range    Glucose 133 (H) 70 - 130 mg/dL   Sodium    Collection Time: 09/10/22  2:25 PM    Specimen: Blood   Result Value Ref Range    Sodium 132 (L) 136 - 145 mmol/L   Adult Transthoracic Echo Complete W/ Cont if Necessary Per Protocol (With Agitated Saline)    Collection  Time: 09/10/22  2:55 PM   Result Value Ref Range    Target HR (85%) 123 bpm    Max. Pred. HR (100%) 145 bpm    BH CV VAS BP RIGHT /71 mmHg    RV S' 14.50 cm/sec    Ascending aorta 2.7 cm    IVRT 88.0 msec    LA ESV Index (BP) 14.9 ml/m2    Avg E/e' ratio 12.84     Ao root diam 2.34 cm    Ao pk evans 160.0 cm/sec    Ao V2 VTI 29.6 cm    SARAH(I,D) 1.76 cm2    EDV(cubed) 43.7 ml    EDV(MOD-sp2) 28.0 ml    EDV(MOD-sp4) 26.0 ml    EF(MOD-bp) 67.0 %    EF(MOD-sp2) 67.9 %    EF(MOD-sp4) 65.4 %    ESV(cubed) 11.7 ml    ESV(MOD-sp2) 9.0 ml    ESV(MOD-sp4) 9.0 ml    IVS/LVPW 1.09 cm    Lat Peak E' Evans 5.8 cm/sec    LV mass(C)d 124.9 grams    LV V1 max PG 5.2 mmHg    LV V1 mean PG 2.28 mmHg    LV V1 max 114.0 cm/sec    LVPWd 1.08 cm    Med Peak E' Evans 5.20 cm/sec    MV dec slope 689.6 cm/sec2    MV dec time 0.34 msec    MV P1/2t 54.2 msec    PA acc slope 729.7 cm/sec2    PA acc time 0.14 sec    PA pr(Accel) 17.2 mmHg    SV(LVOT) 52.1 ml    SV(MOD-sp2) 19.0 ml    SV(MOD-sp4) 17.0 ml    TR max PG 29.7 mmHg    Ao max PG 10.2 mmHg    Ao mean PG 5.1 mmHg    FS 35.5 %    IVSd 1.18 cm    LA dimension (2D)  2.25 cm    LV V1 VTI 22.6 cm    LVIDd 3.5 cm    LVIDs 2.27 cm    LVOT area 2.31 cm2    LVOT diam 1.71 cm    MV E/A 0.73     MVA(P1/2t) 4.1 cm2    MV A max evans 96.3 cm/sec    MV E max evans 70.6 cm/sec    TR max evans 272.4 cm/sec    TAPSE (>1.6) 2.00 cm     Note: In addition to lab results from this visit, the labs listed above may include labs taken at another facility or during a different encounter within the last 24 hours. Please correlate lab times with ED admission and discharge times for further clarification of the services performed during this visit.    MRI Brain Without Contrast   Final Result      1. No acute infarct.   2. Mild changes chronic small vessel ischemic disease. Volume loss.   3. Severe right-sided paranasal sinus disease. Mastoid effusions.          Electronically signed by:  Huey Briones M.D.      9/10/2022 1:47 AM Mountain Time      MRI Lumbar Spine Without Contrast   Final Result      1.  Severe degenerative changes of the lumbar spine. Moderate scoliosis.   2.  At L2-L3, there is severe spinal canal stenosis with crowding/compression of the cauda equina nerve roots due to a circumferential disc extrusion and facet arthropathy. There is also severe right foraminal narrowing.   3.  At L4-L5, there is severe spinal canal stenosis due to grade 2 anterolisthesis and facet arthropathy. There is also severe left foraminal narrowing.   4.  Additional milder degenerative changes detailed above.            Electronically signed by:  Jose Alfredo Finch DO     9/9/2022 11:09 PM Mountain Time      XR Chest 1 View   Final Result       1. No acute cardiopulmonary abnormality.   2. Suspected hiatal hernia.       This report was finalized on 9/9/2022 7:34 PM by Omar Hughes MD.          CT CEREBRAL PERFUSION WITH & WITHOUT CONTRAST   Final Result   No perfusion abnormalities in the brain. No evidence of brain risk or   cortical infarct.       This report was finalized on 9/9/2022 6:46 PM by Omar Hughes MD.          CT Angiogram Head w AI Analysis of LVO   Final Result       1. Minimal atherosclerotic plaque without evidence of stenosis,   occlusion, aneurysm or dissection in the neck or the head.   2. Cervical degenerative changes.   3. Severe chronic obstructive paranasal sinus mucosal disease on the   right.       This report was finalized on 9/9/2022 6:57 PM by Omar Hughes MD.          CT Angiogram Neck   Final Result       1. Minimal atherosclerotic plaque without evidence of stenosis,   occlusion, aneurysm or dissection in the neck or the head.   2. Cervical degenerative changes.   3. Severe chronic obstructive paranasal sinus mucosal disease on the   right.       This report was finalized on 9/9/2022 6:57 PM by Omar Hughes MD.          CT Head Without Contrast Stroke Protocol   Final Result       1. No acute  "intracranial abnormality.   2. Mild chronic small vessel ischemic change.   3. Severe obstructive right-sided paranasal sinus mucosal disease.       This report was finalized on 9/9/2022 6:14 PM by Omar Huhges MD.          IR LUMBAR PUNCTURE DIAGNOSTIC    (Results Pending)     Vitals:    09/10/22 1100 09/10/22 1205 09/10/22 1300 09/10/22 1455   BP:  145/71     BP Location:  Left arm     Patient Position:  Lying     Pulse: 87 92 92    Resp:  18     Temp:  98.9 °F (37.2 °C)     TempSrc:  Oral     SpO2: 93% 97% 96%    Weight:    63 kg (138 lb 14.2 oz)   Height:    162 cm (63.78\")     Medications   sodium chloride 0.9 % flush 10 mL (has no administration in time range)   sodium chloride 0.9 % flush 10 mL (10 mL Intravenous Not Given 9/10/22 0858)   sodium chloride 0.9 % flush 10 mL (has no administration in time range)   atorvastatin (LIPITOR) tablet 80 mg (80 mg Oral Given 9/10/22 0056)   aspirin tablet 325 mg (0 mg Oral Hold 9/10/22 0855)     Or   aspirin suppository 300 mg ( Rectal Not Given:  See Alt 9/10/22 0855)   cholecalciferol (VITAMIN D3) tablet 2,000 Units (2,000 Units Oral Given 9/10/22 0859)   LORazepam (ATIVAN) tablet 0.5 mg (has no administration in time range)   multivitamin with minerals 1 tablet (1 tablet Oral Given 9/10/22 0900)   predniSONE (DELTASONE) tablet 5 mg (5 mg Oral Given 9/10/22 0906)   fluticasone (FLONASE) 50 MCG/ACT nasal spray 2 spray (has no administration in time range)   sodium chloride 0.9 % infusion (75 mL/hr Intravenous New Bag 9/10/22 0230)   Pharmacy to dose vancomycin (has no administration in time range)   ! Hold anticoagulants for 24 hours prior to lumbar puncture (1 each Does not apply Not Given 9/10/22 0856)   cefTRIAXone (ROCEPHIN) 2 g/100 mL 0.9% NS IVPB (MBP) (2 g Intravenous New Bag 9/10/22 8591)   vancomycin (VANCOCIN) 1000 mg/200 mL dextrose 5% IVPB (has no administration in time range)   ! Vancomycin level before 18:00 dose on Sunday 9/11/22; hold dose until level " checked by a pharmacist (has no administration in time range)   Magnesium Sulfate 2 gram Bolus, followed by 8 gram infusion (total Mg dose 10 grams)- Mg less than or equal to 1mg/dL (has no administration in time range)     Or   Magnesium Sulfate 2 gram / 50mL Infusion (GIVE X 3 BAGS TO EQUAL 6GM TOTAL DOSE) - Mg 1.1 - 1.5 mg/dl (has no administration in time range)     Or   Magnesium Sulfate 4 gram infusion- Mg 1.6-1.9 mg/dL (has no administration in time range)   potassium phosphate 45 mmol in sodium chloride 0.9 % 500 mL infusion (has no administration in time range)     Or   potassium phosphate 30 mmol in sodium chloride 0.9 % 250 mL infusion (has no administration in time range)     Or   potassium phosphate 15 mmol in 0.9% sodium chloride 100 mL IVPB (has no administration in time range)     Or   sodium phosphates 45 mmol in sodium chloride 0.9 % 250 mL IVPB (has no administration in time range)     Or   sodium phosphates 30 mmol in sodium chloride 0.9 % 250 mL IVPB (has no administration in time range)     Or   sodium phosphates 15 mmol in sodium chloride 0.9 % 250 mL IVPB (has no administration in time range)   potassium chloride (MICRO-K) CR capsule 40 mEq (40 mEq Oral Given 9/10/22 1516)     Or   potassium chloride (KLOR-CON) packet 40 mEq ( Oral Not Given:  See Alt 9/10/22 1516)     Or   potassium chloride 10 mEq in 100 mL IVPB ( Intravenous Not Given:  See Alt 9/10/22 1516)   acetaminophen (TYLENOL) tablet 650 mg (650 mg Oral Given 9/10/22 1345)   iopamidol (ISOVUE-370) 76 % injection 115 mL (115 mL Intravenous Given 9/9/22 1832)   acyclovir (ZOVIRAX) 550 mg in sodium chloride 0.9 % 100 mL IVPB (550 mg Intravenous New Bag 9/10/22 3520)   vancomycin 1250 mg/250 mL 0.9% NS IVPB ([facility]) (1,250 mg Intravenous New Bag 9/10/22 4689)   potassium chloride (MICRO-K) CR capsule 40 mEq (40 mEq Oral Given 9/10/22 7159)   magnesium sulfate in D5W 1g/100mL (PREMIX) (3 g Intravenous New Bag 9/10/22 3916)   ampicillin 2  g/100 mL 0.9% NS (MBP) (2 g Intravenous New Bag 9/10/22 0403)     ECG/EMG Results (last 24 hours)     Procedure Component Value Units Date/Time    ECG 12 Lead [[id number]] Collected: 09/09/22 1942     Updated: 09/09/22 1944     QT Interval 366 ms      QTC Interval 440 ms     Narrative:      Test Reason : Acute Stroke Protocol (onset < 12 hrs)  Blood Pressure :   */*   mmHG  Vent. Rate :  87 BPM     Atrial Rate :  87 BPM     P-R Int : 144 ms          QRS Dur :  78 ms      QT Int : 366 ms       P-R-T Axes :  45 -52  73 degrees     QTc Int : 440 ms    ** Poor data quality, interpretation may be adversely affected  Normal sinus rhythm  Left axis deviation  Low voltage QRS  Cannot rule out Anterior infarct (cited on or before 10-JUL-2020)  Abnormal ECG  When compared with ECG of 10-JUL-2020 09:33,  Vent. rate has increased BY  30 BPM  Incomplete right bundle branch block is no longer present  Questionable change in initial forces of Septal leads  T wave inversion no longer evident in Anterior leads    Referred By: EDMD           Confirmed By:     Adult Transthoracic Echo Complete W/ Cont if Necessary Per Protocol (With Agitated Saline) [[id number]] Resulted: 09/10/22 1456     Updated: 09/10/22 1504     Target HR (85%) 123 bpm      Max. Pred. HR (100%) 145 bpm      BH CV VAS BP RIGHT /71 mmHg      RV S' 14.50 cm/sec      Ascending aorta 2.7 cm      IVRT 88.0 msec      LA ESV Index (BP) 14.9 ml/m2      Avg E/e' ratio 12.84     Ao root diam 2.34 cm      Ao pk evans 160.0 cm/sec      Ao V2 VTI 29.6 cm      SARAH(I,D) 1.76 cm2      EDV(cubed) 43.7 ml      EDV(MOD-sp2) 28.0 ml      EDV(MOD-sp4) 26.0 ml      EF(MOD-bp) 67.0 %      EF(MOD-sp2) 67.9 %      EF(MOD-sp4) 65.4 %      ESV(cubed) 11.7 ml      ESV(MOD-sp2) 9.0 ml      ESV(MOD-sp4) 9.0 ml      IVS/LVPW 1.09 cm      Lat Peak E' Evans 5.8 cm/sec      LV mass(C)d 124.9 grams      LV V1 max PG 5.2 mmHg      LV V1 mean PG 2.28 mmHg      LV V1 max 114.0 cm/sec      LVPWd 1.08  cm      Med Peak E' Evans 5.20 cm/sec      MV dec slope 689.6 cm/sec2      MV dec time 0.34 msec      MV P1/2t 54.2 msec      PA acc slope 729.7 cm/sec2      PA acc time 0.14 sec      PA pr(Accel) 17.2 mmHg      SV(LVOT) 52.1 ml      SV(MOD-sp2) 19.0 ml      SV(MOD-sp4) 17.0 ml      TR max PG 29.7 mmHg      Ao max PG 10.2 mmHg      Ao mean PG 5.1 mmHg      FS 35.5 %      IVSd 1.18 cm      LA dimension (2D)  2.25 cm      LV V1 VTI 22.6 cm      LVIDd 3.5 cm      LVIDs 2.27 cm      LVOT area 2.31 cm2      LVOT diam 1.71 cm      MV E/A 0.73     MVA(P1/2t) 4.1 cm2      MV A max evans 96.3 cm/sec      MV E max evans 70.6 cm/sec      TR max evans 272.4 cm/sec      TAPSE (>1.6) 2.00 cm     Narrative:      · Saline test results are negative.       Impression:              ECG 12 Lead   Preliminary Result   Test Reason : Acute Stroke Protocol (onset < 12 hrs)   Blood Pressure :   */*   mmHG   Vent. Rate :  87 BPM     Atrial Rate :  87 BPM      P-R Int : 144 ms          QRS Dur :  78 ms       QT Int : 366 ms       P-R-T Axes :  45 -52  73 degrees      QTc Int : 440 ms      ** Poor data quality, interpretation may be adversely affected   Normal sinus rhythm   Left axis deviation   Low voltage QRS   Cannot rule out Anterior infarct (cited on or before 10-JUL-2020)   Abnormal ECG   When compared with ECG of 10-JUL-2020 09:33,   Vent. rate has increased BY  30 BPM   Incomplete right bundle branch block is no longer present   Questionable change in initial forces of Septal leads   T wave inversion no longer evident in Anterior leads      Referred By: EDMD           Confirmed By:                                                MDM    Final diagnoses:   Altered mental status, unspecified altered mental status type   Hypokalemia       ED Disposition  ED Disposition     ED Disposition   Decision to Admit    Condition   --    Comment   Level of Care: Telemetry [5]   Diagnosis: AMS (altered mental status) [[id number]]   Admitting Physician: MAYTE  SLOANE HOOK [[id number]]   Attending Physician: SLOANE HU [[id number]]   Certification: I Certify That Inpatient Hospital Services Are Medically Necessary For Greater Than 2 Midnights                  Rob Vyas DO  09/10/22 5999

## 2022-09-10 ENCOUNTER — APPOINTMENT (OUTPATIENT)
Dept: MRI IMAGING | Facility: HOSPITAL | Age: 77
End: 2022-09-10

## 2022-09-10 ENCOUNTER — APPOINTMENT (OUTPATIENT)
Dept: CARDIOLOGY | Facility: HOSPITAL | Age: 77
End: 2022-09-10

## 2022-09-10 LAB
ALBUMIN SERPL-MCNC: 3.9 G/DL (ref 3.5–5.2)
ALBUMIN/GLOB SERPL: 1.6 G/DL
ALP SERPL-CCNC: 35 U/L (ref 39–117)
ALT SERPL W P-5'-P-CCNC: 25 U/L (ref 1–33)
ANION GAP SERPL CALCULATED.3IONS-SCNC: 14 MMOL/L (ref 5–15)
ASCENDING AORTA: 2.7 CM
AST SERPL-CCNC: 39 U/L (ref 1–32)
B PARAPERT DNA SPEC QL NAA+PROBE: NOT DETECTED
B PERT DNA SPEC QL NAA+PROBE: NOT DETECTED
BASOPHILS # BLD MANUAL: 0 10*3/MM3 (ref 0–0.2)
BASOPHILS NFR BLD MANUAL: 0 % (ref 0–1.5)
BH CV ECHO MEAS - AO MAX PG: 10.2 MMHG
BH CV ECHO MEAS - AO MEAN PG: 5.1 MMHG
BH CV ECHO MEAS - AO ROOT DIAM: 2.34 CM
BH CV ECHO MEAS - AO V2 MAX: 160 CM/SEC
BH CV ECHO MEAS - AO V2 VTI: 29.6 CM
BH CV ECHO MEAS - AVA(I,D): 1.76 CM2
BH CV ECHO MEAS - EDV(CUBED): 43.7 ML
BH CV ECHO MEAS - EDV(MOD-SP2): 28 ML
BH CV ECHO MEAS - EDV(MOD-SP4): 26 ML
BH CV ECHO MEAS - EF(MOD-BP): 67 %
BH CV ECHO MEAS - EF(MOD-SP2): 67.9 %
BH CV ECHO MEAS - EF(MOD-SP4): 65.4 %
BH CV ECHO MEAS - ESV(CUBED): 11.7 ML
BH CV ECHO MEAS - ESV(MOD-SP2): 9 ML
BH CV ECHO MEAS - ESV(MOD-SP4): 9 ML
BH CV ECHO MEAS - FS: 35.5 %
BH CV ECHO MEAS - IVS/LVPW: 1.09 CM
BH CV ECHO MEAS - IVSD: 1.18 CM
BH CV ECHO MEAS - LA DIMENSION: 2.25 CM
BH CV ECHO MEAS - LAT PEAK E' VEL: 5.8 CM/SEC
BH CV ECHO MEAS - LV MASS(C)D: 124.9 GRAMS
BH CV ECHO MEAS - LV MAX PG: 5.2 MMHG
BH CV ECHO MEAS - LV MEAN PG: 2.28 MMHG
BH CV ECHO MEAS - LV V1 MAX: 114 CM/SEC
BH CV ECHO MEAS - LV V1 VTI: 22.6 CM
BH CV ECHO MEAS - LVIDD: 3.5 CM
BH CV ECHO MEAS - LVIDS: 2.27 CM
BH CV ECHO MEAS - LVOT AREA: 2.31 CM2
BH CV ECHO MEAS - LVOT DIAM: 1.71 CM
BH CV ECHO MEAS - LVPWD: 1.08 CM
BH CV ECHO MEAS - MED PEAK E' VEL: 5.2 CM/SEC
BH CV ECHO MEAS - MV A MAX VEL: 96.3 CM/SEC
BH CV ECHO MEAS - MV DEC SLOPE: 689.6 CM/SEC2
BH CV ECHO MEAS - MV DEC TIME: 0.34 MSEC
BH CV ECHO MEAS - MV E MAX VEL: 70.6 CM/SEC
BH CV ECHO MEAS - MV E/A: 0.73
BH CV ECHO MEAS - MV P1/2T: 54.2 MSEC
BH CV ECHO MEAS - MVA(P1/2T): 4.1 CM2
BH CV ECHO MEAS - PA ACC SLOPE: 729.7 CM/SEC2
BH CV ECHO MEAS - PA ACC TIME: 0.14 SEC
BH CV ECHO MEAS - PA PR(ACCEL): 17.2 MMHG
BH CV ECHO MEAS - SV(LVOT): 52.1 ML
BH CV ECHO MEAS - SV(MOD-SP2): 19 ML
BH CV ECHO MEAS - SV(MOD-SP4): 17 ML
BH CV ECHO MEAS - TAPSE (>1.6): 2 CM
BH CV ECHO MEAS - TR MAX PG: 29.7 MMHG
BH CV ECHO MEAS - TR MAX VEL: 272.4 CM/SEC
BH CV ECHO MEASUREMENTS AVERAGE E/E' RATIO: 12.84
BH CV VAS BP RIGHT ARM: NORMAL MMHG
BH CV XLRA - TDI S': 14.5 CM/SEC
BILIRUB SERPL-MCNC: 0.6 MG/DL (ref 0–1.2)
BUN SERPL-MCNC: 11 MG/DL (ref 8–23)
BUN/CREAT SERPL: 14.9 (ref 7–25)
C PNEUM DNA NPH QL NAA+NON-PROBE: NOT DETECTED
CALCIUM SPEC-SCNC: 9.3 MG/DL (ref 8.6–10.5)
CHLORIDE SERPL-SCNC: 90 MMOL/L (ref 98–107)
CHOLEST SERPL-MCNC: 228 MG/DL (ref 0–200)
CO2 SERPL-SCNC: 29 MMOL/L (ref 22–29)
CREAT SERPL-MCNC: 0.74 MG/DL (ref 0.57–1)
CYTOLOGIST CVX/VAG CYTO: NORMAL
D-LACTATE SERPL-SCNC: 1.7 MMOL/L (ref 0.5–2)
DEPRECATED RDW RBC AUTO: 43.6 FL (ref 37–54)
EGFRCR SERPLBLD CKD-EPI 2021: 84.5 ML/MIN/1.73
EOSINOPHIL # BLD MANUAL: 0.12 10*3/MM3 (ref 0–0.4)
EOSINOPHIL NFR BLD MANUAL: 2 % (ref 0.3–6.2)
ERYTHROCYTE [DISTWIDTH] IN BLOOD BY AUTOMATED COUNT: 14.4 % (ref 12.3–15.4)
FLUAV SUBTYP SPEC NAA+PROBE: NOT DETECTED
FLUBV RNA ISLT QL NAA+PROBE: NOT DETECTED
FOLATE SERPL-MCNC: 11.5 NG/ML (ref 4.78–24.2)
GLOBULIN UR ELPH-MCNC: 2.5 GM/DL
GLUCOSE BLDC GLUCOMTR-MCNC: 103 MG/DL (ref 70–130)
GLUCOSE BLDC GLUCOMTR-MCNC: 133 MG/DL (ref 70–130)
GLUCOSE SERPL-MCNC: 106 MG/DL (ref 65–99)
HADV DNA SPEC NAA+PROBE: NOT DETECTED
HBA1C MFR BLD: 5.9 % (ref 4.8–5.6)
HCOV 229E RNA SPEC QL NAA+PROBE: NOT DETECTED
HCOV HKU1 RNA SPEC QL NAA+PROBE: NOT DETECTED
HCOV NL63 RNA SPEC QL NAA+PROBE: NOT DETECTED
HCOV OC43 RNA SPEC QL NAA+PROBE: NOT DETECTED
HCT VFR BLD AUTO: 34.9 % (ref 34–46.6)
HDLC SERPL-MCNC: 57 MG/DL (ref 40–60)
HGB BLD-MCNC: 11.9 G/DL (ref 12–15.9)
HMPV RNA NPH QL NAA+NON-PROBE: NOT DETECTED
HPIV1 RNA ISLT QL NAA+PROBE: NOT DETECTED
HPIV2 RNA SPEC QL NAA+PROBE: NOT DETECTED
HPIV3 RNA NPH QL NAA+PROBE: NOT DETECTED
HPIV4 P GENE NPH QL NAA+PROBE: NOT DETECTED
IVRT: 88 MSEC
LDLC SERPL CALC-MCNC: 131 MG/DL (ref 0–100)
LDLC/HDLC SERPL: 2.21 {RATIO}
LEFT ATRIUM VOLUME INDEX: 14.9 ML/M2
LYMPHOCYTES # BLD MANUAL: 3.66 10*3/MM3 (ref 0.7–3.1)
LYMPHOCYTES NFR BLD MANUAL: 15 % (ref 5–12)
M PNEUMO IGG SER IA-ACNC: NOT DETECTED
MAGNESIUM SERPL-MCNC: 1.6 MG/DL (ref 1.6–2.4)
MAXIMAL PREDICTED HEART RATE: 145 BPM
MCH RBC QN AUTO: 28.5 PG (ref 26.6–33)
MCHC RBC AUTO-ENTMCNC: 34.1 G/DL (ref 31.5–35.7)
MCV RBC AUTO: 83.7 FL (ref 79–97)
MONOCYTES # BLD: 0.89 10*3/MM3 (ref 0.1–0.9)
NEUTROPHILS # BLD AUTO: 1.24 10*3/MM3 (ref 1.7–7)
NEUTROPHILS NFR BLD MANUAL: 20 % (ref 42.7–76)
NEUTS BAND NFR BLD MANUAL: 1 % (ref 0–5)
PATH INTERP BLD-IMP: NORMAL
PLAT MORPH BLD: NORMAL
PLATELET # BLD AUTO: 151 10*3/MM3 (ref 140–450)
PMV BLD AUTO: 11.4 FL (ref 6–12)
POTASSIUM SERPL-SCNC: 2.6 MMOL/L (ref 3.5–5.2)
PROCALCITONIN SERPL-MCNC: 0.03 NG/ML (ref 0–0.25)
PROT SERPL-MCNC: 6.4 G/DL (ref 6–8.5)
RBC # BLD AUTO: 4.17 10*6/MM3 (ref 3.77–5.28)
RBC MORPH BLD: NORMAL
RHINOVIRUS RNA SPEC NAA+PROBE: NOT DETECTED
RSV RNA NPH QL NAA+NON-PROBE: NOT DETECTED
SARS-COV-2 RNA NPH QL NAA+NON-PROBE: NOT DETECTED
SODIUM SERPL-SCNC: 131 MMOL/L (ref 136–145)
SODIUM SERPL-SCNC: 132 MMOL/L (ref 136–145)
SODIUM SERPL-SCNC: 133 MMOL/L (ref 136–145)
SODIUM SERPL-SCNC: 133 MMOL/L (ref 136–145)
STRESS TARGET HR: 123 BPM
TRIGL SERPL-MCNC: 225 MG/DL (ref 0–150)
VARIANT LYMPHS NFR BLD MANUAL: 3 % (ref 0–5)
VARIANT LYMPHS NFR BLD MANUAL: 59 % (ref 19.6–45.3)
VIT B12 BLD-MCNC: 1143 PG/ML (ref 211–946)
VLDLC SERPL-MCNC: 40 MG/DL (ref 5–40)
WBC MORPH BLD: NORMAL
WBC NRBC COR # BLD: 5.91 10*3/MM3 (ref 3.4–10.8)

## 2022-09-10 PROCEDURE — 99232 SBSQ HOSP IP/OBS MODERATE 35: CPT | Performed by: PSYCHIATRY & NEUROLOGY

## 2022-09-10 PROCEDURE — 25010000002 ACYCLOVIR PER 5 MG: Performed by: INTERNAL MEDICINE

## 2022-09-10 PROCEDURE — 93306 TTE W/DOPPLER COMPLETE: CPT | Performed by: INTERNAL MEDICINE

## 2022-09-10 PROCEDURE — 0202U NFCT DS 22 TRGT SARS-COV-2: CPT | Performed by: INTERNAL MEDICINE

## 2022-09-10 PROCEDURE — 92523 SPEECH SOUND LANG COMPREHEN: CPT

## 2022-09-10 PROCEDURE — 25010000002 AMPICILLIN PER 500 MG: Performed by: INTERNAL MEDICINE

## 2022-09-10 PROCEDURE — 83605 ASSAY OF LACTIC ACID: CPT | Performed by: INTERNAL MEDICINE

## 2022-09-10 PROCEDURE — B01BZZZ FLUOROSCOPY OF SPINAL CORD: ICD-10-PCS | Performed by: STUDENT IN AN ORGANIZED HEALTH CARE EDUCATION/TRAINING PROGRAM

## 2022-09-10 PROCEDURE — 83036 HEMOGLOBIN GLYCOSYLATED A1C: CPT | Performed by: NURSE PRACTITIONER

## 2022-09-10 PROCEDURE — 97166 OT EVAL MOD COMPLEX 45 MIN: CPT

## 2022-09-10 PROCEDURE — 82962 GLUCOSE BLOOD TEST: CPT

## 2022-09-10 PROCEDURE — 25010000002 CEFTRIAXONE PER 250 MG: Performed by: INTERNAL MEDICINE

## 2022-09-10 PROCEDURE — 86592 SYPHILIS TEST NON-TREP QUAL: CPT | Performed by: PSYCHIATRY & NEUROLOGY

## 2022-09-10 PROCEDURE — 84295 ASSAY OF SERUM SODIUM: CPT | Performed by: NURSE PRACTITIONER

## 2022-09-10 PROCEDURE — 25010000002 MAGNESIUM SULFATE IN D5W 1G/100ML (PREMIX) 1-5 GM/100ML-% SOLUTION: Performed by: INTERNAL MEDICINE

## 2022-09-10 PROCEDURE — 85007 BL SMEAR W/DIFF WBC COUNT: CPT | Performed by: NURSE PRACTITIONER

## 2022-09-10 PROCEDURE — 85025 COMPLETE CBC W/AUTO DIFF WBC: CPT | Performed by: NURSE PRACTITIONER

## 2022-09-10 PROCEDURE — 82746 ASSAY OF FOLIC ACID SERUM: CPT | Performed by: NURSE PRACTITIONER

## 2022-09-10 PROCEDURE — 80061 LIPID PANEL: CPT | Performed by: NURSE PRACTITIONER

## 2022-09-10 PROCEDURE — 87040 BLOOD CULTURE FOR BACTERIA: CPT | Performed by: INTERNAL MEDICINE

## 2022-09-10 PROCEDURE — 99232 SBSQ HOSP IP/OBS MODERATE 35: CPT | Performed by: STUDENT IN AN ORGANIZED HEALTH CARE EDUCATION/TRAINING PROGRAM

## 2022-09-10 PROCEDURE — 93306 TTE W/DOPPLER COMPLETE: CPT

## 2022-09-10 PROCEDURE — 83735 ASSAY OF MAGNESIUM: CPT | Performed by: NURSE PRACTITIONER

## 2022-09-10 PROCEDURE — 25010000002 VANCOMYCIN 10 G RECONSTITUTED SOLUTION

## 2022-09-10 PROCEDURE — 80053 COMPREHEN METABOLIC PANEL: CPT | Performed by: NURSE PRACTITIONER

## 2022-09-10 PROCEDURE — 009U3ZX DRAINAGE OF SPINAL CANAL, PERCUTANEOUS APPROACH, DIAGNOSTIC: ICD-10-PCS | Performed by: STUDENT IN AN ORGANIZED HEALTH CARE EDUCATION/TRAINING PROGRAM

## 2022-09-10 PROCEDURE — 82607 VITAMIN B-12: CPT | Performed by: NURSE PRACTITIONER

## 2022-09-10 PROCEDURE — 63710000001 PREDNISONE PER 5 MG: Performed by: NURSE PRACTITIONER

## 2022-09-10 PROCEDURE — 70551 MRI BRAIN STEM W/O DYE: CPT

## 2022-09-10 RX ORDER — PREDNISONE 1 MG/1
5 TABLET ORAL DAILY
Status: DISCONTINUED | OUTPATIENT
Start: 2022-09-10 | End: 2022-09-16 | Stop reason: HOSPADM

## 2022-09-10 RX ORDER — MAGNESIUM SULFATE 1 G/100ML
3 INJECTION INTRAVENOUS ONCE
Status: COMPLETED | OUTPATIENT
Start: 2022-09-10 | End: 2022-09-10

## 2022-09-10 RX ORDER — POTASSIUM CHLORIDE 1.5 G/1.77G
40 POWDER, FOR SOLUTION ORAL AS NEEDED
Status: DISCONTINUED | OUTPATIENT
Start: 2022-09-10 | End: 2022-09-16 | Stop reason: HOSPADM

## 2022-09-10 RX ORDER — SODIUM CHLORIDE 450 MG/100ML
75 INJECTION, SOLUTION INTRAVENOUS CONTINUOUS
Status: DISCONTINUED | OUTPATIENT
Start: 2022-09-10 | End: 2022-09-10

## 2022-09-10 RX ORDER — FLUTICASONE PROPIONATE 50 MCG
2 SPRAY, SUSPENSION (ML) NASAL DAILY PRN
Status: DISCONTINUED | OUTPATIENT
Start: 2022-09-10 | End: 2022-09-16 | Stop reason: HOSPADM

## 2022-09-10 RX ORDER — FENTANYL/ROPIVACAINE/NS/PF 2-625MCG/1
15 PLASTIC BAG, INJECTION (ML) EPIDURAL AS NEEDED
Status: DISCONTINUED | OUTPATIENT
Start: 2022-09-10 | End: 2022-09-16 | Stop reason: HOSPADM

## 2022-09-10 RX ORDER — SODIUM CHLORIDE 9 MG/ML
75 INJECTION, SOLUTION INTRAVENOUS CONTINUOUS
Status: DISCONTINUED | OUTPATIENT
Start: 2022-09-10 | End: 2022-09-10

## 2022-09-10 RX ORDER — POTASSIUM CHLORIDE 750 MG/1
40 CAPSULE, EXTENDED RELEASE ORAL AS NEEDED
Status: DISCONTINUED | OUTPATIENT
Start: 2022-09-10 | End: 2022-09-16 | Stop reason: HOSPADM

## 2022-09-10 RX ORDER — SODIUM CHLORIDE 0.9 % (FLUSH) 0.9 %
10 SYRINGE (ML) INJECTION EVERY 12 HOURS SCHEDULED
Status: DISCONTINUED | OUTPATIENT
Start: 2022-09-10 | End: 2022-09-16 | Stop reason: HOSPADM

## 2022-09-10 RX ORDER — MELATONIN
2000 DAILY
Status: DISCONTINUED | OUTPATIENT
Start: 2022-09-10 | End: 2022-09-16 | Stop reason: HOSPADM

## 2022-09-10 RX ORDER — FLUTICASONE PROPIONATE 50 MCG
2 SPRAY, SUSPENSION (ML) NASAL 2 TIMES DAILY
Status: DISCONTINUED | OUTPATIENT
Start: 2022-09-10 | End: 2022-09-10

## 2022-09-10 RX ORDER — ACETAMINOPHEN 325 MG/1
650 TABLET ORAL EVERY 6 HOURS PRN
Status: DISCONTINUED | OUTPATIENT
Start: 2022-09-10 | End: 2022-09-16 | Stop reason: HOSPADM

## 2022-09-10 RX ORDER — LEFLUNOMIDE 10 MG/1
20 TABLET ORAL DAILY
Status: DISCONTINUED | OUTPATIENT
Start: 2022-09-10 | End: 2022-09-10

## 2022-09-10 RX ORDER — SODIUM CHLORIDE 9 MG/ML
75 INJECTION, SOLUTION INTRAVENOUS CONTINUOUS
Status: ACTIVE | OUTPATIENT
Start: 2022-09-10 | End: 2022-09-10

## 2022-09-10 RX ORDER — ATORVASTATIN CALCIUM 40 MG/1
80 TABLET, FILM COATED ORAL NIGHTLY
Status: DISCONTINUED | OUTPATIENT
Start: 2022-09-10 | End: 2022-09-16 | Stop reason: HOSPADM

## 2022-09-10 RX ORDER — SODIUM CHLORIDE 0.9 % (FLUSH) 0.9 %
10 SYRINGE (ML) INJECTION AS NEEDED
Status: DISCONTINUED | OUTPATIENT
Start: 2022-09-10 | End: 2022-09-16 | Stop reason: HOSPADM

## 2022-09-10 RX ORDER — MAGNESIUM SULFATE HEPTAHYDRATE 40 MG/ML
4 INJECTION, SOLUTION INTRAVENOUS AS NEEDED
Status: DISCONTINUED | OUTPATIENT
Start: 2022-09-10 | End: 2022-09-16 | Stop reason: HOSPADM

## 2022-09-10 RX ORDER — MULTIPLE VITAMINS W/ MINERALS TAB 9MG-400MCG
1 TAB ORAL DAILY
Status: DISCONTINUED | OUTPATIENT
Start: 2022-09-10 | End: 2022-09-16 | Stop reason: HOSPADM

## 2022-09-10 RX ORDER — MAGNESIUM SULFATE HEPTAHYDRATE 40 MG/ML
2 INJECTION, SOLUTION INTRAVENOUS AS NEEDED
Status: DISCONTINUED | OUTPATIENT
Start: 2022-09-10 | End: 2022-09-16 | Stop reason: HOSPADM

## 2022-09-10 RX ORDER — PRAVASTATIN SODIUM 40 MG
80 TABLET ORAL DAILY
Status: DISCONTINUED | OUTPATIENT
Start: 2022-09-10 | End: 2022-09-10 | Stop reason: ALTCHOICE

## 2022-09-10 RX ORDER — VANCOMYCIN HYDROCHLORIDE 1 G/200ML
1000 INJECTION, SOLUTION INTRAVENOUS
Status: DISCONTINUED | OUTPATIENT
Start: 2022-09-11 | End: 2022-09-10

## 2022-09-10 RX ORDER — POTASSIUM CHLORIDE 7.45 MG/ML
10 INJECTION INTRAVENOUS
Status: DISCONTINUED | OUTPATIENT
Start: 2022-09-10 | End: 2022-09-16 | Stop reason: HOSPADM

## 2022-09-10 RX ORDER — LORAZEPAM 0.5 MG/1
0.5 TABLET ORAL EVERY 8 HOURS PRN
Status: DISCONTINUED | OUTPATIENT
Start: 2022-09-10 | End: 2022-09-16 | Stop reason: HOSPADM

## 2022-09-10 RX ORDER — ASPIRIN 81 MG/1
81 TABLET ORAL DAILY
Status: DISCONTINUED | OUTPATIENT
Start: 2022-09-10 | End: 2022-09-10 | Stop reason: ALTCHOICE

## 2022-09-10 RX ORDER — POTASSIUM CHLORIDE 750 MG/1
40 CAPSULE, EXTENDED RELEASE ORAL ONCE
Status: COMPLETED | OUTPATIENT
Start: 2022-09-10 | End: 2022-09-10

## 2022-09-10 RX ADMIN — SODIUM CHLORIDE 2 G: 900 INJECTION INTRAVENOUS at 04:41

## 2022-09-10 RX ADMIN — PREDNISONE 5 MG: 5 TABLET ORAL at 09:06

## 2022-09-10 RX ADMIN — AMPICILLIN SODIUM 2 G: 2 INJECTION, POWDER, FOR SOLUTION INTRAVENOUS at 04:03

## 2022-09-10 RX ADMIN — POTASSIUM CHLORIDE 40 MEQ: 750 CAPSULE, EXTENDED RELEASE ORAL at 04:29

## 2022-09-10 RX ADMIN — ACETAMINOPHEN 650 MG: 325 TABLET, FILM COATED ORAL at 20:52

## 2022-09-10 RX ADMIN — POTASSIUM CHLORIDE 40 MEQ: 750 CAPSULE, EXTENDED RELEASE ORAL at 10:51

## 2022-09-10 RX ADMIN — ACETAMINOPHEN 650 MG: 325 TABLET, FILM COATED ORAL at 13:45

## 2022-09-10 RX ADMIN — ATORVASTATIN CALCIUM 80 MG: 40 TABLET, FILM COATED ORAL at 20:52

## 2022-09-10 RX ADMIN — Medication 10 ML: at 20:53

## 2022-09-10 RX ADMIN — POTASSIUM CHLORIDE 40 MEQ: 750 CAPSULE, EXTENDED RELEASE ORAL at 17:53

## 2022-09-10 RX ADMIN — POTASSIUM CHLORIDE 40 MEQ: 750 CAPSULE, EXTENDED RELEASE ORAL at 15:16

## 2022-09-10 RX ADMIN — Medication 2000 UNITS: at 08:59

## 2022-09-10 RX ADMIN — ATORVASTATIN CALCIUM 80 MG: 40 TABLET, FILM COATED ORAL at 00:56

## 2022-09-10 RX ADMIN — MAGNESIUM SULFATE HEPTAHYDRATE 3 G: 10 INJECTION, SOLUTION INTRAVENOUS at 04:31

## 2022-09-10 RX ADMIN — MULTIPLE VITAMINS W/ MINERALS TAB 1 TABLET: TAB at 09:00

## 2022-09-10 RX ADMIN — ACYCLOVIR SODIUM 550 MG: 500 INJECTION, SOLUTION INTRAVENOUS at 04:40

## 2022-09-10 RX ADMIN — SODIUM CHLORIDE 2 G: 900 INJECTION INTRAVENOUS at 17:52

## 2022-09-10 RX ADMIN — SODIUM CHLORIDE 75 ML/HR: 9 INJECTION, SOLUTION INTRAVENOUS at 02:30

## 2022-09-10 RX ADMIN — VANCOMYCIN HYDROCHLORIDE 1250 MG: 10 INJECTION, POWDER, LYOPHILIZED, FOR SOLUTION INTRAVENOUS at 04:39

## 2022-09-10 RX ADMIN — SODIUM CHLORIDE 75 ML/HR: 9 INJECTION, SOLUTION INTRAVENOUS at 00:55

## 2022-09-10 NOTE — THERAPY EVALUATION
Acute Care - Speech Language Pathology Initial Evaluation  [facility]   Cognitive-Communication Evaluation       Patient Name: Vane Villavicencio  : 1945  MRN: [id number]  Today's Date: 9/10/2022               Admit Date: 2022     Visit Dx:    ICD-10-CM ICD-9-CM   1. Altered mental status, unspecified altered mental status type  R41.82 780.97   2. Hypokalemia  E87.6 276.8   3. Cognitive communication deficit  R41.841 799.52     Patient Active Problem List   Diagnosis   • Supraventricular tachycardia (HCC)   • Rheumatoid arthritis (HCC)   • Hypercholesterolemia   • Benign hypertension   • Abnormal stress test   • AMS (altered mental status)   • Hypokalemia     Past Medical History:   Diagnosis Date   • Asthma    • Benign hypertension    • Disease of thyroid gland    • Family history of heart disease    • Family history of heart disease    • Hypercholesterolemia    • Rheumatoid arthritis (HCC)    • Rheumatoid arthritis (HCC)    • Supraventricular tachycardia (HCC)     first diagnosed 2012   • Supraventricular tachycardia (HCC)      Past Surgical History:   Procedure Laterality Date   • CARDIAC CATHETERIZATION N/A 7/10/2020    Procedure: Left Heart Cath;  Surgeon: Pankaj Pollard MD;  Location:  [facility];  Service: Cardiology;  Laterality: N/A;   • SINUS SURGERY         SLP Recommendation and Plan  SLP Diagnosis: Pt presents with moderate-severe cognitive-communication deficits. (09/10/22 1430)        [facility] Criteria for Skilled Therapy Interventions Met: yes (09/10/22 1430)  Anticipated Discharge Disposition (SLP): unknown, anticipate therapy at next level of care (09/10/22 143)        Predicted Duration Therapy Intervention (Days): until discharge (09/10/22 1430)                    Plan of Care Reviewed With: patient, spouse, family (09/10/22 1621)      SLP EVALUATION (last 72 hours)     SLP SLC Evaluation     Row Name 09/10/22 143                   Communication  Assessment/Intervention    Document Type evaluation  -DV        Subjective Information no complaints  -DV        Patient Observations alert;cooperative  -DV        Patient/Family/Caregiver Comments/Observations spouse and family present  -DV        Patient Effort good  -DV        Symptoms Noted During/After Treatment none  -DV                  General Information    Patient Profile Reviewed yes  -DV        Pertinent History Of Current Problem 78yo adm w/ AMS. Imaging negative for acute infarction. ?sepsis  -DV        Precautions/Limitations, Vision WFL;for purposes of eval  -DV        Precautions/Limitations, Hearing WFL;for purposes of eval  -DV        Prior Level of Function-Communication WFL  -DV        Plans/Goals Discussed with patient;spouse/S.O.;family;agreed upon  -DV        Barriers to Rehab cognitive status  -DV        Patient's Goals for Discharge patient did not state  -DV        Family Goals for Discharge family did not state  -DV                  Pain    Additional Documentation Pain Scale: Numbers Pre/Post-Treatment (Group)  -DV                  Pain Scale: Numbers Pre/Post-Treatment    Pretreatment Pain Rating 0/10 - no pain  -DV        Posttreatment Pain Rating 0/10 - no pain  -DV                  Comprehension Assessment/Intervention    Comprehension Assessment/Intervention Auditory Comprehension  -DV                  Auditory Comprehension Assessment/Intervention    Auditory Comprehension (Communication) moderate impairment  -DV        Answers Questions (Communication) yes/no;simple;WFL  -DV        Able to Follow Commands (Communication) 3-step;moderate impairment;2-step;WFL  -DV                  Expression Assessment/Intervention    Expression Assessment/Intervention verbal expression  -DV                  Verbal Expression Assessment/Intervention    Verbal Expression moderate impairment  -DV        Responsive Naming moderate impairment  -DV        Confrontational Naming WFL  -DV         Spontaneous/Functional Words --  -DV        Sentence Formulation simple;WFL  -DV        Conversational Discourse/Fluency moderate impairment  -DV                  Motor Speech Assessment/Intervention    Motor Speech Function WFL  -DV                  Cursory Voice Assessment/Intervention    Quality and Resonance (Voice) WFL  -DV                  Cognitive Assessment Intervention- SLP    Cognitive Function (Cognition) moderate impairment;severe impairment  -DV        Orientation Status (Cognition) awareness of basic personal information;moderate impairment  -DV        Memory (Cognitive) short-term;severe impairment  -DV        Attention (Cognitive) mild impairment  -DV        Thought Organization (Cognitive) concrete divergent;severe impairment  -DV        Reasoning (Cognitive) mental flexibility;moderate impairment  -DV                  SLP Evaluation Clinical Impressions    SLP Diagnosis Pt presents with moderate-severe cognitive-communication deficits.  -DV        Rehab Potential/Prognosis good  -DV        SLC Criteria for Skilled Therapy Interventions Met yes  -DV        Functional Impact functional impact in social situations;functional impact in ADLs;needs occasional supervision;difficulty communicating in an emergency;difficulty in expressing complex messages;decreased ability to respond to situations safely;Poor Judgement  -DV                  Recommendations    Therapy Frequency (SLP SLC) 5 days per week  -DV        Predicted Duration Therapy Intervention (Days) until discharge  -DV        Anticipated Discharge Disposition (SLP) unknown;anticipate therapy at next level of care  -DV              User Key  (r) = Recorded By, (t) = Taken By, (c) = Cosigned By    Initials Name Effective Dates    Zuri Prado MS CCC-SLP 06/16/21 -                    EDUCATION  The patient has been educated in the following areas:     Cognitive Impairment Communication Impairment.           SLP GOALS     Row Name 09/10/22 0970              Follow Directions Goal 2 (SLP)    Improve Ability to Follow Directions Goal 1 (SLP) 3 step commands;80%;with minimal cues (75-90%)  -DV      Time Frame (Follow Directions Goal 1, SLP) short term goal (STG)  -DV      Progress/Outcomes (Follow Directions Goal 1, SLP) new goal  -DV              Word Retrieval Skills Goal 1 (SLP)    Improve Word Retrieval Skills By Goal 1 (SLP) responsive naming task;completing a divergent task;90%;independently (over 90% accuracy)  -DV      Time Frame (Word Retrieval Goal 1, SLP) short term goal (STG)  -DV      Progress/Outcomes (Word Retrieval Goal 1, SLP) new goal  -DV              Connected Speech to Express Thoughts Goal 1 (SLP)    Improve Narrative Discourse to Express Thoughts By Goal 1 (SLP) describing a picture;conversational task on a given topic;90%;independently (over 90% accuracy)  -DV      Time Frame (Connected Speech Goal 1, SLP) short term goal (STG)  -DV      Progress/Outcomes (Connected Speech Goal 1, SLP) new goal  -DV              Awareness of Basic Personal Information Goal 1 (SLP)    Improve Awareness of Basic Personal Information Goal 1 (SLP) answering open-ended questions regarding personal/biographical information;90%;with minimal cues (75-90%)  -DV      Time Frame (Awareness of Basic Personal Information Goal 1, SLP) short term goal (STG)  -DV      Progress/Outcomes (Awareness of Basic Personal Information Goal 1, SLP) new goal  -DV              Organizational Skills Goal 1 (SLP)    Improve Thought Organization Through Goal 1 (SLP) naming similarities and differences;completing a divergent naming task;80%;with minimal cues (75-90%)  -DV      Time Frame (Thought Organization Skills Goal 1, SLP) short term goal (STG)  -DV      Progress/Outcomes (Thought Organization Skills Goal 1, SLP) new goal  -DV              Additional Goal 1 (SLP)    Additional Goal 1, SLP LTG: Pt will demonstrate funcitonal cognitive-communication skills for discharge  environment with mod cues.  -DV      Time Frame (Additional Goal 1, SLP) by discharge  -DV      Progress/Outcomes (Additional Goal 1, SLP) new goal  -DV            User Key  (r) = Recorded By, (t) = Taken By, (c) = Cosigned By    Initials Name Provider Type    Zuri Prado MS CCC-SLP Speech and Language Pathologist                        Time Calculation:      Time Calculation- SLP     Row Name 09/10/22 1622             Time Calculation- SLP    SLP Start Time 1430  -DV      SLP Received On 09/10/22  -DV              Untimed Charges    SLP Eval/Re-eval  ST Eval Speech and Production w/ Language - 26976  -DV      [id number] Eval Speech and Production w/ Language Minutes 40  -DV              Total Minutes    Untimed Charges Total Minutes 40  -DV       Total Minutes 40  -DV            User Key  (r) = Recorded By, (t) = Taken By, (c) = Cosigned By    Initials Name Provider Type    Zuri Prado MS CCC-SLP Speech and Language Pathologist                Therapy Charges for Today     Code Description Service Date Service Provider Modifiers Qty    [id number] HC ST EVAL SPEECH AND PROD W LANG  3 9/10/2022 Zuri Silva MS CCC-SLP GN 1                     Zuri Silva MS CCC-CRISTOPHER  9/10/2022

## 2022-09-10 NOTE — CONSULTS
INFECTIOUS DISEASE CONSULT/INITIAL HOSPITAL VISIT    Vane Villavicencio  1945  [id number]    Date of Consult: 9/10/22    Admission Date: 9/9/2022      Requesting Provider: Chapincito Ramirez DO  Evaluating Physician: Les Morin MD    CC: Weakness    Reason for Consultation: back pain, recent injection, tnf inh, confusion, concern for occult cns infection    History of present illness:    Patient is a 77 y.o. female with h/o HTN, hypothyroidism, on Plavix, RA/Arava/pred/Kevzara injections were started 2 weeks ago--2nd injection prior to admission (previously on orencia), HLD, and SVT who presented to [facility] ED on 9/9 for altered mental status.  Her  was able to provide history on arrival.  The patient ambulates with cane, but became increasingly weaker and not make sense.  The patient was alert and oriented x 3 in ED.  She denies fever, chills, shortness of breath, nausea, vomiting, diarrhea, dysuria, headaches, or dizziness.  She has ongoing back pain and had epidural injections about 1 to 2 weeks prior to arrival.  She also noted that she has had worsening RLE weakness.  She has developed a tremor and she had complained that her home was freezing cold.  The stroke team was called on arrival and imaging was negative for acute findings.  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  She has remained afebrile on room air.  Admitting labs were WBC 7100 with 31% neutrophils/49% lymphocytes, ALT 34, AST 43, Na 133, K 3.1, lactic acid 2.8, and UA benign.  A repeat WBC today was 5600 with 20% segs/1% bands/59% lymphocytes.  A UDS was positive for opiates and benzos. A COVID/flu PCR was negative.  A respiratory panel PCR was negative.  An MRI of lumbar spine showed severe degenerative changes of lumbar spine, moderate scoliosis, L2-L3 severe spinal stenosis with crowding of cauda equina nerve roots, and L4-L5 severe spinal canal stenosis. A CXR showed no acute findings. She  is currently on Rocephin and Vancomycin after getting Acyclovir and ampicillin.  ID was asked to evaluate and manage any antibiotic therapy.     Past Medical History:   Diagnosis Date   • Asthma    • Benign hypertension    • Disease of thyroid gland    • Family history of heart disease    • Family history of heart disease    • Hypercholesterolemia    • Rheumatoid arthritis (HCC)    • Rheumatoid arthritis (HCC)    • Supraventricular tachycardia (HCC)     first diagnosed 06/01/2012   • Supraventricular tachycardia (HCC)        Past Surgical History:   Procedure Laterality Date   • CARDIAC CATHETERIZATION N/A 7/10/2020    Procedure: Left Heart Cath;  Surgeon: Pankaj Pollard MD;  Location: [facility] CATH INVASIVE LOCATION;  Service: Cardiology;  Laterality: N/A;   • SINUS SURGERY         Family History   Problem Relation Age of Onset   • Heart disease Other    • Hypertension Sister        Social History     Socioeconomic History   • Marital status:    Tobacco Use   • Smoking status: Never Smoker   • Smokeless tobacco: Never Used   Vaping Use   • Vaping Use: Never used   Substance and Sexual Activity   • Alcohol use: No   • Drug use: No   • Sexual activity: Defer       No Known Allergies      Medication:    Current Facility-Administered Medications:   •  ! Hold anticoagulants for 24 hours prior to lumbar puncture, 1 each, Does not apply, BID, Chapincito Ramirez, DO  •  [START ON 9/11/2022] ! Vancomycin level before 18:00 dose on Sunday 9/11/22; hold dose until level checked by a pharmacist, , Does not apply, Once, Pankaj Roblero, [facility]  •  acetaminophen (TYLENOL) tablet 650 mg, 650 mg, Oral, Q6H PRN, Shannen Marks MD, 650 mg at 09/10/22 2052  •  aspirin tablet 325 mg, 325 mg, Oral, Daily, 325 mg at 09/09/22 1952 **OR** aspirin suppository 300 mg, 300 mg, Rectal, Daily, Linda Lind APRN  •  atorvastatin (LIPITOR) tablet 80 mg, 80 mg, Oral, Nightly, Linda Lind APRN, 80 mg at 09/10/22 2052  •  cefTRIAXone  (ROCEPHIN) 2 g/100 mL 0.9% NS IVPB (MBP), 2 g, Intravenous, Q12H, Chapincito Ramirez, DO, 2 g at 09/10/22 1752  •  cholecalciferol (VITAMIN D3) tablet 2,000 Units, 2,000 Units, Oral, Daily, Kareen, Kia, APRN, 2,000 Units at 09/10/22 0859  •  fluticasone (FLONASE) 50 MCG/ACT nasal spray 2 spray, 2 spray, Each Nare, Daily PRN, Pankaj Roblero, [facility]  •  LORazepam (ATIVAN) tablet 0.5 mg, 0.5 mg, Oral, Q8H PRN, Kareen, Kia, APRN  •  Magnesium Sulfate 2 gram Bolus, followed by 8 gram infusion (total Mg dose 10 grams)- Mg less than or equal to 1mg/dL, 2 g, Intravenous, PRN **OR** Magnesium Sulfate 2 gram / 50mL Infusion (GIVE X 3 BAGS TO EQUAL 6GM TOTAL DOSE) - Mg 1.1 - 1.5 mg/dl, 2 g, Intravenous, PRN **OR** Magnesium Sulfate 4 gram infusion- Mg 1.6-1.9 mg/dL, 4 g, Intravenous, PRN, Shannen Marks MD  •  multivitamin with minerals 1 tablet, 1 tablet, Oral, Daily, Kareen, Kia, APRN, 1 tablet at 09/10/22 0900  •  potassium chloride (MICRO-K) CR capsule 40 mEq, 40 mEq, Oral, PRN, 40 mEq at 09/10/22 1753 **OR** potassium chloride (KLOR-CON) packet 40 mEq, 40 mEq, Oral, PRN **OR** potassium chloride 10 mEq in 100 mL IVPB, 10 mEq, Intravenous, Q1H PRN, Shannen Marks MD  •  potassium phosphate 45 mmol in sodium chloride 0.9 % 500 mL infusion, 45 mmol, Intravenous, PRN **OR** potassium phosphate 30 mmol in sodium chloride 0.9 % 250 mL infusion, 30 mmol, Intravenous, PRN **OR** potassium phosphate 15 mmol in 0.9% sodium chloride 100 mL IVPB, 15 mmol, Intravenous, PRN **OR** sodium phosphates 45 mmol in sodium chloride 0.9 % 250 mL IVPB, 45 mmol, Intravenous, PRN **OR** sodium phosphates 30 mmol in sodium chloride 0.9 % 250 mL IVPB, 30 mmol, Intravenous, PRN **OR** sodium phosphates 15 mmol in sodium chloride 0.9 % 250 mL IVPB, 15 mmol, Intravenous, PRN, Shannen Marks MD  •  predniSONE (DELTASONE) tablet 5 mg, 5 mg, Oral, Daily, Kia Mathur, APRN, 5 mg at 09/10/22 0906  •  sodium chloride 0.9 % flush 10 mL, 10 mL,  Intravenous, PRN, [place], Rob, DO  •  sodium chloride 0.9 % flush 10 mL, 10 mL, Intravenous, Q12H, Linda Lind, APRN, 10 mL at 09/10/22 2053  •  sodium chloride 0.9 % flush 10 mL, 10 mL, Intravenous, PRN, Linda Lind, APRN    Antibiotics:  Anti-Infectives (From admission, onward)    Ordered     Dose/Rate Route Frequency Start Stop    09/10/22 0318  ampicillin 2 g/100 mL 0.9% NS (MBP)        Ordering Provider: Chapincito Ramirez DO    2 g  over 30 Minutes Intravenous Once 09/10/22 0415 09/10/22 0433    09/10/22 0218  vancomycin 1250 mg/250 mL 0.9% NS IVPB ([facility])        Ordering Provider: Pankaj Roblero, RPH    20 mg/kg × 63.7 kg  over 90 Minutes Intravenous Once 09/10/22 0330 09/10/22 0609    09/10/22 0211  cefTRIAXone (ROCEPHIN) 2 g/100 mL 0.9% NS IVPB (MBP)        Ordering Provider: Chapincito Ramirez DO    2 g  over 30 Minutes Intravenous Every 12 Hours Scheduled 09/10/22 0245 09/15/22 0559    09/10/22 0152  acyclovir (ZOVIRAX) 550 mg in sodium chloride 0.9 % 100 mL IVPB        Ordering Provider: Chapincito Ramirez DO    10 mg/kg × 54.7 kg (Ideal)  over 60 Minutes Intravenous Every 8 Hours Scheduled 09/10/22 0230 09/10/22 0540            Review of Systems:  Constitutional-- No Fever,+ chills no sweats.  Appetite decreased, and no malaise. No fatigue.  HEENT-- No new vision, hearing or throat complaints.  No epistaxis or oral sores.  Denies odynophagia or dysphagia. No headache, photophobia or neck stiffness.  CV-- No chest pain, palpitation or syncope  Resp-- No SOB/cough/Hemoptysis  GI- + nausea, no vomiting, or diarrhea.  No hematochezia, melena, or hematemesis. Denies jaundice or chronic liver disease.  -- No dysuria, hematuria, or flank pain.  Denies hesitancy, urgency or burning with urination.  Lymph- no swollen lymph nodes in neck/axilla or groin.   Heme- No active bruising or bleeding; no Hx of DVT or PE.  MS-- chronic joint pain.  No new back pain.  Neuro-- No acute focal weakness or numbness in the  arms or legs.  No seizures.  Skin--No rashes or lesions      Physical Exam:   Vital Signs  Temp (24hrs), Av.7 °F (37.1 °C), Min:97.8 °F (36.6 °C), Max:99.1 °F (37.3 °C)    Temp  Min: 97.8 °F (36.6 °C)  Max: 99.1 °F (37.3 °C)  BP  Min: 130/68  Max: 145/71  Pulse  Min: 83  Max: 94  Resp  Min: 18  Max: 18  SpO2  Min: 93 %  Max: 99 %    GENERAL: Awake and alert, in no acute distress.   HEENT: Normocephalic, atraumatic.  PERRL. EOMI. No conjunctival injection. No icterus. Oropharynx clear without evidence of thrush or exudate.  NECK: Supple without nuchal rigidity. No mass.  LYMPH: No cervical, axillary or inguinal lymphadenopathy.  HEART: RRR; No murmur, rubs, gallops.   LUNGS: Clear to auscultation bilaterally without wheezing, rales, rhonchi. Normal respiratory effort. Nonlabored.  ABDOMEN: Soft, nontender, nondistended. Positive bowel sounds. No rebound or guarding. NO mass or HSM.  EXT:  No cyanosis, clubbing or edema. No cord.  :  With Ortiz catheter.  MSK:  FROM, no joint effusions.  No spinal tenderness.  SKIN: Warm and dry without cutaneous eruptions on Inspection/palpation.    NEURO: Oriented to PPT.  Normal speech and cognition.  Some LT memory issues.  PSYCHIATRIC: Normal insight and judgment. Cooperative with PE    Laboratory Data    Results from last 7 days   Lab Units 09/10/22  0437 09/09/22  1818 09/09/22  1817   WBC 10*3/mm3 5.91  --  7.11   HEMOGLOBIN g/dL 11.9*  --  13.6   HEMOGLOBIN, POC g/dL  --  15.0  --    HEMATOCRIT % 34.9  --  40.0   HEMATOCRIT POC %  --  44  --    PLATELETS 10*3/mm3 151  --  163     Results from last 7 days   Lab Units 09/10/22  1914 09/10/22  1425 09/10/22  0437   SODIUM mmol/L 131*   < > 133*  133*   POTASSIUM mmol/L  --   --  2.6*   CHLORIDE mmol/L  --   --  90*   CO2 mmol/L  --   --  29.0   BUN mg/dL  --   --  11   CREATININE mg/dL  --   --  0.74   GLUCOSE mg/dL  --   --  106*   CALCIUM mg/dL  --   --  9.3    < > = values in this interval not displayed.     Results  from last 7 days   Lab Units 09/10/22  0437   ALK PHOS U/L 35*   BILIRUBIN mg/dL 0.6   ALT (SGPT) U/L 25   AST (SGOT) U/L 39*             Results from last 7 days   Lab Units 09/10/22  0437   LACTATE mmol/L 1.7             Estimated Creatinine Clearance: 65.3 mL/min (by C-G formula based on SCr of 0.74 mg/dL).      Microbiology:  Microbiology Results (last 10 days)     Procedure Component Value - Date/Time    Respiratory Panel PCR w/COVID-19(SARS-CoV-2) TITA/HELENA/BRANDY/PAD/COR/MAD/RISA In-House, NP Swab in UTM/VTM, 3-4 HR TAT - Swab, Nasopharynx [[id number]]  (Normal) Collected: 09/10/22 0506    Lab Status: Final result Specimen: Swab from Nasopharynx Updated: 09/10/22 0571     ADENOVIRUS, PCR Not Detected     Coronavirus 229E Not Detected     Coronavirus HKU1 Not Detected     Coronavirus NL63 Not Detected     Coronavirus OC43 Not Detected     COVID19 Not Detected     Human Metapneumovirus Not Detected     Human Rhinovirus/Enterovirus Not Detected     Influenza A PCR Not Detected     Influenza B PCR Not Detected     Parainfluenza Virus 1 Not Detected     Parainfluenza Virus 2 Not Detected     Parainfluenza Virus 3 Not Detected     Parainfluenza Virus 4 Not Detected     RSV, PCR Not Detected     Bordetella pertussis pcr Not Detected     Bordetella parapertussis PCR Not Detected     Chlamydophila pneumoniae PCR Not Detected     Mycoplasma pneumo by PCR Not Detected    Narrative:      In the setting of a positive respiratory panel with a viral infection PLUS a negative procalcitonin without other underlying concern for bacterial infection, consider observing off antibiotics or discontinuation of antibiotics and continue supportive care. If the respiratory panel is positive for atypical bacterial infection (Bordetella pertussis, Chlamydophila pneumoniae, or Mycoplasma pneumoniae), consider antibiotic de-escalation to target atypical bacterial infection.    COVID PRE-OP / PRE-PROCEDURE SCREENING ORDER (NO ISOLATION) - Swab,  Nasopharynx [[id number]]  (Normal) Collected: 09/09/22 2115    Lab Status: Final result Specimen: Swab from Nasopharynx Updated: 09/09/22 2146    Narrative:      The following orders were created for panel order COVID PRE-OP / PRE-PROCEDURE SCREENING ORDER (NO ISOLATION) - Swab, Nasopharynx.  Procedure                               Abnormality         Status                     ---------                               -----------         ------                     COVID-19 and FLU A/B PCR...[[id number]]  Normal              Final result                 Please view results for these tests on the individual orders.    COVID-19 and FLU A/B PCR - Swab, Nasopharynx [[id number]]  (Normal) Collected: 09/09/22 2115    Lab Status: Final result Specimen: Swab from Nasopharynx Updated: 09/09/22 2146     COVID19 Not Detected     Influenza A PCR Not Detected     Influenza B PCR Not Detected    Narrative:      Fact sheet for providers: https://www.fda.gov/media/334040/download    Fact sheet for patients: https://www.fda.gov/media/479406/download    Test performed by PCR.                Radiology:  Adult Transthoracic Echo Complete W/ Cont if Necessary Per Protocol (With Agitated Saline)    Addendum Date: 9/10/2022    · Left ventricular systolic function is hyperdynamic (EF > 70%). · Left ventricular diastolic function is consistent with (grade I) impaired relaxation. · The cardiac valves are anatomically and functionally normal. · Nondiagnostic bubble study      CT Angiogram Neck    Result Date: 9/9/2022   1. Minimal atherosclerotic plaque without evidence of stenosis, occlusion, aneurysm or dissection in the neck or the head. 2. Cervical degenerative changes. 3. 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  This report was finalized on 9/9/2022 6:57 PM by Omar Hughes MD.      MRI Brain Without Contrast    Result Date: 9/10/2022  1. No acute infarct. 2. Mild changes chronic small vessel ischemic  disease. Volume loss. 3. Severe right-sided paranasal sinus disease. Mastoid effusions.  Electronically signed by:  Huey Briones M.D.  9/10/2022 1:47 AM Mountain Time    MRI Lumbar Spine Without Contrast    Result Date: 9/10/2022  1.  Severe degenerative changes of the lumbar spine. Moderate scoliosis. 2.  At L2-L3, there is severe spinal canal stenosis with crowding/compression of the cauda equina nerve roots due to a circumferential disc extrusion and facet arthropathy. There is also severe right foraminal narrowing. 3.  At L4-L5, there is severe spinal canal stenosis due to grade 2 anterolisthesis and facet arthropathy. There is also severe left foraminal narrowing. 4.  Additional milder degenerative changes detailed above. Electronically signed by:  Jose Alfredo Finch DO  9/9/2022 11:09 PM Mountain Time    XR Chest 1 View    Result Date: 9/9/2022   1. No acute cardiopulmonary abnormality. 2. Suspected hiatal hernia.  This report was finalized on 9/9/2022 7:34 PM by Omar Hughes MD.      CT Head Without Contrast Stroke Protocol    Result Date: 9/9/2022   1. No acute intracranial abnormality. 2. Mild chronic small vessel ischemic change. 3. Severe obstructive right-sided paranasal sinus mucosal disease.  This report was finalized on 9/9/2022 6:14 PM by Omar Hughes MD.      CT Angiogram Head w AI Analysis of LVO    Result Date: 9/9/2022   1. Minimal atherosclerotic plaque without evidence of stenosis, occlusion, aneurysm or dissection in the neck or the head. 2. Cervical degenerative changes. 3. 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  This report was finalized on 9/9/2022 6:57 PM by Omar Hughes MD.      CT CEREBRAL PERFUSION WITH & WITHOUT CONTRAST    Result Date: 9/9/2022  No perfusion abnormalities in the brain. No evidence of brain risk or cortical infarct.  This report was finalized on 9/9/2022 6:46 PM by Omar Hughes MD.          PROBLEM LIST:   - Encephalopathy,  acute.  Possible drug adverse event or recent epidural injections.  -Elevated lymphocytes on CBC may be explained by drug adverse event to Kevzara  - Chronic back pain, recent epdiural injections.  MRI not indicative of active infection.  - Lactic acidosis, improved  - Hyponatremia  - Hypokalemia  - Rheumatoid arthritis/on Arava, prednisone.  Start Kevzara 2 weeks ago with 2nd injection prior to admission. Was on orenzia.  - Immunocompromised host  - Essential hypertension  - H/o supraventricular tachycardia.    ASSESSMENT: 78 yo female with h/o RA/on Arava, prednisone, and recently started Kevzara injections 2 weeks prior with 2nd dose prior to admission, HTN, and SVT who was admitted for altered mental status.  She was lucent after arrival.  A CVA work up was negative.  Her labs were significant for neutropenia which may be a adverse reaction to Kevzara. Her UA is benign.  Her CXR is negative.  She recently had lumbar back epidural injections.  An MRI of her back does not shown any active infection, but shows chronic changes.  She is currently off Arava and Kevzara.  She is currently on Rocephin and Vancomycin.  She was given doses of Acyclovir and ampicillin.  Will change to Rocephin monotherapy as appropriate today without headache or neck stiffness see no clinical signs of spinal infection or meningitis at this time      PLAN/RECOMMENDATIONS:   Discontinue vancomycin no further ampicillin or acyclovir  Continue ceftriaxone for now  Follow-up cultures  See no indication for lumbar puncture at this time  Get ESR CRP in a.m.    Les Morin MD saw and examined patient, verified hx and PE, read all radiographic studies, reviewed labs and micro data, and formulated dx, plan for treatment and all medical decision making.      Hansel Acosta PA-C for MD Les Peterson MD  9/10/2022  22:26 EDT

## 2022-09-10 NOTE — PROGRESS NOTES
"Pharmacy Consult-Vancomycin Dosing  Vane Villavicencio is a  77 y.o. female receiving vancomycin therapy.     Indication: Altered mental status (Possible CNS infection)  Consulting Provider:  ID Consult: Yes    Goal AUC: 400 - 600 mg/L*hr    Current Antimicrobial Therapy  Anti-Infectives (From admission, onward)      Ordered     Dose/Rate Route Frequency Start Stop    09/10/22 0224  vancomycin (VANCOCIN) 1000 mg/200 mL dextrose 5% IVPB        Ordering Provider: Pankaj Roblero, RPH    1,000 mg  over 60 Minutes Intravenous Every 18 Hours 09/11/22 0000 09/16/22 0559    09/10/22 0218  vancomycin 1250 mg/250 mL 0.9% NS IVPB ([facility])        Ordering Provider: Pankaj Roblero RPH    20 mg/kg × 63.7 kg  over 90 Minutes Intravenous Once 09/10/22 0330      09/10/22 0211  cefTRIAXone (ROCEPHIN) 2 g/100 mL 0.9% NS IVPB (MBP)        Ordering Provider: Chapincito Ramirez DO    2 g  over 30 Minutes Intravenous Every 12 Hours Scheduled 09/10/22 0245 09/15/22 0559    09/10/22 0152  acyclovir (ZOVIRAX) 550 mg in sodium chloride 0.9 % 100 mL IVPB        Ordering Provider: Chapincito Ramirez DO    10 mg/kg × 54.7 kg (Ideal)  over 60 Minutes Intravenous Every 8 Hours Scheduled 09/10/22 0230 09/10/22 0959    09/10/22 0152  Pharmacy to dose vancomycin        Ordering Provider: Chapincito Ramirez DO     Does not apply Continuous PRN 09/10/22 0149 09/17/22 0148            Allergies  Allergies as of 09/09/2022    (No Known Allergies)       Labs    Results from last 7 days   Lab Units 09/09/22  1818 09/09/22  1817   BUN mg/dL  --  11   CREATININE mg/dL 0.80 0.84       Results from last 7 days   Lab Units 09/09/22  1817   WBC 10*3/mm3 7.11       Evaluation of Dosing     Last Dose Received in the ED/Outside Facility: None noted  Is Patient on Dialysis or Renal Replacement:     Ht - 162.6 cm (64\")  Wt - 63.7 kg (140 lb 6.4 oz)    Estimated Creatinine Clearance: 61.1 mL/min (by C-G formula based on SCr of 0.8 mg/dL).    Intake & Output (last 3 days)       None "            Microbiology and Radiology  Microbiology Results (last 10 days)       Procedure Component Value - Date/Time    COVID PRE-OP / PRE-PROCEDURE SCREENING ORDER (NO ISOLATION) - Swab, Nasopharynx [[id number]]  (Normal) Collected: 09/09/22 2115    Lab Status: Final result Specimen: Swab from Nasopharynx Updated: 09/09/22 2146    Narrative:      The following orders were created for panel order COVID PRE-OP / PRE-PROCEDURE SCREENING ORDER (NO ISOLATION) - Swab, Nasopharynx.  Procedure                               Abnormality         Status                     ---------                               -----------         ------                     COVID-19 and FLU A/B PCR...[[id number]]  Normal              Final result                 Please view results for these tests on the individual orders.    COVID-19 and FLU A/B PCR - Swab, Nasopharynx [[id number]]  (Normal) Collected: 09/09/22 2115    Lab Status: Final result Specimen: Swab from Nasopharynx Updated: 09/09/22 2146     COVID19 Not Detected     Influenza A PCR Not Detected     Influenza B PCR Not Detected    Narrative:      Fact sheet for providers: https://www.fda.gov/media/682912/download    Fact sheet for patients: https://www.fda.gov/media/349648/download    Test performed by PCR.            Reported Vancomycin Levels                         [employer] AUC Calculation:    Current AUC: 0 mg/L*hr    Predicted Steady State AUC on Current Dose: 514 mg/L*hr (1000 mg Q18H)  _________________________________    Predicted Steady State AUC on New Dose:   mg/L*hr    Assessment/Plan:   Vancomycin 1250 mg IV x 1, then Vancomycin 1000 mg Q18H (16 mg/kg/dose)  Vancomycin level 12:00 Noon on Sunday 9/11/22 (12 hours after 2nd dose)  BMP x 3 days  Pharmacy to follow; ID Consult ordered    Pankaj Roblero [facility]  9/10/22 03:00

## 2022-09-10 NOTE — PROGRESS NOTES
[facility] Medicine Services  PROGRESS NOTE    Patient Name: Vane Villavicencio  : 1945  MRN: [id number]    Date of Admission: 2022  Primary Care Physician: Rolando Jeronimo MD    Subjective   Subjective     CC:  F/u weakness, confusion    HPI:  Family at bedside, no acute complaints. Tremors much better today    ROS:  Gen- No fevers, chills  CV- No chest pain, palpitations  Resp- No cough, dyspnea  GI- No N/V/D, abd pain         Objective   Objective     Vital Signs:   Temp:  [98.7 °F (37.1 °C)-99.1 °F (37.3 °C)] 98.9 °F (37.2 °C)  Heart Rate:  [83-99] 93  Resp:  [18] 18  BP: (130-156)/() 130/68     Physical Exam:  Constitutional: No acute distress, awake, alert  HENT: NCAT, mucous membranes moist  Respiratory: Clear to auscultation bilaterally, respiratory effort normal   Cardiovascular: RRR, holosystolic murmur present  Gastrointestinal: Positive bowel sounds, soft, nontender, nondistended  Musculoskeletal: No bilateral ankle edema  Psychiatric: Appropriate affect, cooperative  Neurologic: Oriented x 3, strength symmetric in all extremities, BLE tremors  Skin: No rashes      Results Reviewed:  LAB RESULTS:      Lab 09/10/22  0437 22  2325 22  2116 22  1818 22  1817   WBC 5.91  --   --   --  7.11   HEMOGLOBIN 11.9*  --   --   --  13.6   HEMOGLOBIN, POC  --   --   --  15.0  --    HEMATOCRIT 34.9  --   --   --  40.0   HEMATOCRIT POC  --   --   --  44  --    PLATELETS 151  --   --   --  163   NEUTROS ABS 1.24*  --   --   --  2.24   IMMATURE GRANS (ABS)  --   --   --   --  0.02   LYMPHS ABS  --   --   --   --  3.51*   MONOS ABS  --   --   --   --  1.19*   EOS ABS 0.12  --   --   --  0.04   MCV 83.7  --   --   --  84.6   PROCALCITONIN  --   --  0.03  --   --    LACTATE 1.7 2.8*  --   --   --    PROTIME  --   --   --  11.9*  --    APTT  --   --   --   --  28.8         Lab 09/10/22  0437 22   SODIUM 133*  133*  --  133*    POTASSIUM 2.6*  --  3.1*   CHLORIDE 90*  --  87*   CO2 29.0  --  25.0   ANION GAP 14.0  --  21.0*   BUN 11  --  11   CREATININE 0.74 0.80 0.84   EGFR 84.5 76.9 72.6   GLUCOSE 106*  --  104*   CALCIUM 9.3  --  10.2   MAGNESIUM 1.6  --  1.6   HEMOGLOBIN A1C 5.90*  --   --    TSH  --   --  2.070         Lab 09/10/22  0437 09/09/22  1817   TOTAL PROTEIN 6.4  --    ALBUMIN 3.90  --    GLOBULIN 2.5  --    ALT (SGPT) 25 34*   AST (SGOT) 39* 43*   BILIRUBIN 0.6  --    ALK PHOS 35*  --          Lab 09/09/22 2116 09/09/22 1818 09/09/22 1817   TROPONIN T <0.010  --  <0.010   PROTIME  --  11.9*  --    INR  --  1.0  --          Lab 09/10/22  0437   CHOLESTEROL 228*   LDL CHOL 131*   HDL CHOL 57   TRIGLYCERIDES 225*             Brief Urine Lab Results  (Last result in the past 365 days)      Color   Clarity   Blood   Leuk Est   Nitrite   Protein   CREAT   Urine HCG        09/09/22 1930 Yellow   Clear   Negative   Negative   Negative   Negative                 Microbiology Results Abnormal     Procedure Component Value - Date/Time    Respiratory Panel PCR w/COVID-19(SARS-CoV-2) TITA/HELENA/BRANDY/PAD/COR/MAD/RISA In-House, NP Swab in UTM/VTM, 3-4 HR TAT - Swab, Nasopharynx [[id number]]  (Normal) Collected: 09/10/22 0502    Lab Status: Final result Specimen: Swab from Nasopharynx Updated: 09/10/22 3687     ADENOVIRUS, PCR Not Detected     Coronavirus 229E Not Detected     Coronavirus HKU1 Not Detected     Coronavirus NL63 Not Detected     Coronavirus OC43 Not Detected     COVID19 Not Detected     Human Metapneumovirus Not Detected     Human Rhinovirus/Enterovirus Not Detected     Influenza A PCR Not Detected     Influenza B PCR Not Detected     Parainfluenza Virus 1 Not Detected     Parainfluenza Virus 2 Not Detected     Parainfluenza Virus 3 Not Detected     Parainfluenza Virus 4 Not Detected     RSV, PCR Not Detected     Bordetella pertussis pcr Not Detected     Bordetella parapertussis PCR Not Detected     Chlamydophila pneumoniae  PCR Not Detected     Mycoplasma pneumo by PCR Not Detected    Narrative:      In the setting of a positive respiratory panel with a viral infection PLUS a negative procalcitonin without other underlying concern for bacterial infection, consider observing off antibiotics or discontinuation of antibiotics and continue supportive care. If the respiratory panel is positive for atypical bacterial infection (Bordetella pertussis, Chlamydophila pneumoniae, or Mycoplasma pneumoniae), consider antibiotic de-escalation to target atypical bacterial infection.    COVID PRE-OP / PRE-PROCEDURE SCREENING ORDER (NO ISOLATION) - Swab, Nasopharynx [[id number]]  (Normal) Collected: 09/09/22 2115    Lab Status: Final result Specimen: Swab from Nasopharynx Updated: 09/09/22 2146    Narrative:      The following orders were created for panel order COVID PRE-OP / PRE-PROCEDURE SCREENING ORDER (NO ISOLATION) - Swab, Nasopharynx.  Procedure                               Abnormality         Status                     ---------                               -----------         ------                     COVID-19 and FLU A/B PCR...[[id number]]  Normal              Final result                 Please view results for these tests on the individual orders.    COVID-19 and FLU A/B PCR - Swab, Nasopharynx [[id number]]  (Normal) Collected: 09/09/22 2115    Lab Status: Final result Specimen: Swab from Nasopharynx Updated: 09/09/22 2146     COVID19 Not Detected     Influenza A PCR Not Detected     Influenza B PCR Not Detected    Narrative:      Fact sheet for providers: https://www.fda.gov/media/302263/download    Fact sheet for patients: https://www.fda.gov/media/471417/download    Test performed by PCR.          CT Angiogram Neck    Result Date: 9/9/2022  Examination: CT ANGIOGRAM NECK-, CT ANGIOGRAM HEAD W AI ANALYSIS OF LVO-  Date of Exam: 9/9/2022 6:13 PM  Indication: Stroke, follow up.  Comparison: None available.  Technique: Axial volumetric  contrast-enhanced CT angiographic images of the head and neck were acquired utilizing 115 mL Isovue-370  IV contrast. 3-D reconstructions were created for interpretation. Automated exposure control and iterative reconstruction methods were used. AI analysis of LVO was utilized for the CTA Head imaging portion of the study.   Findings: Aortic arch: There is minimal plaque in the aortic arch.  There is 4 vessel arch anatomy with the left vertebral artery arising directly from the aortic arch. The right brachiocephalic and visualized bilateral subclavian arteries are within normal limits.  Right carotid: The right CCA arises as expected from the brachiocephalic trunk.  The CCA follows a normal course and appears normal caliber. There is minimal CCA calcified plaque. The carotid bifurcation is unremarkable.  The external carotid artery and distal branches appear within normal limits.  The cervical internal carotid artery follows a normal course and appears normal caliber throughout the neck and into the head.  The intracranial ICA segments appear within normal limits. The ophthalmic artery origin is normal.  The A1 and M1 segments appear within normal limits.  The visualized distal EDDIE and MCA branches appear patent.  There is  a patent  anterior communicating artery. There is a patent  posterior communicating artery.  Left carotid: The left CCA arises as expected from the aortic arch.  The CCA follows a normal course and appears normal caliber.  There is mild nonstenosing plaque at the carotid bifurcation and distal CCA. The external carotid artery and distal branches appear within normal limits.  The cervical internal carotid artery follows a normal course and appears normal caliber throughout the neck and into the head.  The intracranial ICA segments appear within normal limits.  The ophthalmic artery origin is normal.  The A1 and M1 segments appear within normal limits.  The visualized distal EDDIE and MCA branches  appear patent. There is a patent  posterior communicating artery.  Posterior circulation:Left vertebral artery arises from the aortic arch and the right vertebral artery arises from the subclavian artery. There is right vertebral artery dominance. The vertebral arteries follow a normal course and appear normal caliber throughout the neck and into the head.  The V4 segments are patent.  Visualized posterior inferior cerebellar arteries are within normal limits.  The basilar artery is normal caliber.  Superior cerebellar arteries are patent.  Bilateral P1 segments and posterior cerebral arteries appear within normal limits.   Nonvascular findings: Intracranial structures appear unremarkable. There is evidence of prior lens replacement. There is severe obstructive paranasal sinus mucosal disease throughout the right side. Mastoids are well aerated. Superficial soft tissues and underlying musculature appear within normal limits.  The lung apices are clear.  The thyroid and salivary glands appear unremarkable.  The suprahyoid and infrahyoid spaces of the neck appear unremarkable.  There is no evidence of cervical lymphadenopathy or a neck mass.  There are no acute osseous abnormalities or destructive bone lesions. There are moderate cervical degenerative changes. No high-grade spinal canal stenosis. There is 3 mm anterolisthesis of C4 on C5 and there is severe C5-C6 disc degeneration.      Impression:  1. Minimal atherosclerotic plaque without evidence of stenosis, occlusion, aneurysm or dissection in the neck or the head. 2. Cervical degenerative changes. 3. 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  This report was finalized on 9/9/2022 6:57 PM by Omar Hughes MD.      MRI Brain Without Contrast    Result Date: 9/10/2022  EXAMINATION: MRI BRAIN WITHOUT CONTRAST.  DATE: 9/10/2022 2:56 AM  INDICATION: Stroke follow-up.  COMPARISON: CT 9/9/2022  TECHNIQUE:  Sagittal T1, axial T1, T2,  FLAIR, diffusion, SWI, coronal T1 images through the brain. No contrast. FINDINGS:  Intracranial contents: Ventricular and cisternal spaces are prominent from volume loss. Mild periventricular and subcortical white matter T2 hyperintensities, also noted in the jade. No dominant mass, midline shift, hydrocephalus, extra axial fluid collection or acute hemorrhage.  Flow voids of the proximal major cerebral arteries are patent on T2 images. Diffusion sequence: No focus of restricted diffusion to suggest acute ischemia. Limited due to artifacts. Blood sensitive sequence: No significant blood product. Extracranial soft tissues: Mild mucosal thickening right frontal sinus. Opacified right ethmoid air cells. Opacified right maxillary sinus. Opacified right sphenoid sinus. Mild mucosal thickening left maxillary sinus and left sphenoid sinus. Retention cyst or polyp inferior left maxillary sinus. Increased signal right greater than left mastoid air cells. Cataract surgery.     Impression: 1. No acute infarct. 2. Mild changes chronic small vessel ischemic disease. Volume loss. 3. Severe right-sided paranasal sinus disease. Mastoid effusions.  Electronically signed by:  Huey Briones M.D.  9/10/2022 1:47 AM Mountain Time    MRI Lumbar Spine Without Contrast    Result Date: 9/10/2022  EXAMINATION: MRI LUMBAR SPINE WO CONTRAST DATE: 9/9/2022 11:41 PM  INDICATION: back pain, recent epidural injection, RLE weakness  COMPARISON: None available. TECHNIQUE: Sagittal and axial noncontrast images of the lumbar spine were obtained in the usual manner. FINDINGS: For this report, there are 5 lumbar type vertebral bodies. Moderate scoliosis. 8 mm grade 2 anterolisthesis of L4 on L5. Conus terminates at L2. Nerve roots of the cauda equina are normal. No acute fractures. Vertebral body heights are maintained. No concerning marrow signal. Imaged portions of the sacrum and sacroiliac joints are within normal limits. Imaged abdominal  soft tissues are unremarkable. Moderate atrophy of the lower lumbar paraspinal muscles. More superiorly, mild atrophy of the right posterior paraspinal muscles at the level of the thoracolumbar junction. Degenerative Changes: T12-L1: Seen only on sagittal view. Disc height loss. Disc bulge. Facet arthropathy. Mild bilateral foraminal narrowing. No significant spinal canal narrowing. L1-L2: Left eccentric disc bulge. Mild facet arthropathy. Mild bilateral foraminal narrowing. No significant spinal canal narrowing. L2-L3: Loss of disc height. Large diffuse disc extrusion. Moderate facet arthropathy. Severe spinal canal narrowing. Thecal sac measures 3 mm in AP dimension. There is severe bilateral subarticular zone narrowing, worse on the left. Severe right and moderate left foraminal narrowing. L3-L4: Left eccentric disc bulge. Moderate facet arthropathy. Moderate bilateral foraminal narrowing. Mild left subarticular zone narrowing. Moderate spinal canal narrowing. Thecal sac measures 7 mm in AP dimension. L4-L5: Grade 2 anterolisthesis with uncovering the superior aspect of the disc. Severe facet arthropathy. Moderate right and severe left foraminal narrowing. Severe bilateral subarticular zone narrowing, worse on the left. Severe thecal sac stenosis which measures 5 mm in AP dimension. L5-S1: Mild facet arthropathy. Small disc bulge. No significant spinal canal or foraminal narrowing.     Impression: 1.  Severe degenerative changes of the lumbar spine. Moderate scoliosis. 2.  At L2-L3, there is severe spinal canal stenosis with crowding/compression of the cauda equina nerve roots due to a circumferential disc extrusion and facet arthropathy. There is also severe right foraminal narrowing. 3.  At L4-L5, there is severe spinal canal stenosis due to grade 2 anterolisthesis and facet arthropathy. There is also severe left foraminal narrowing. 4.  Additional milder degenerative changes detailed above. Electronically  signed by:  Jose Alfredo Finch DO  9/9/2022 11:09 PM Mountain Time    XR Chest 1 View    Result Date: 9/9/2022  Examination: XR CHEST 1 VW-  Date of Exam: 9/9/2022 6:44 PM  Indication: Acute Stroke Protocol (onset < 12 hrs).  Comparison: None available.  Technique: Single radiographic view of the chest was obtained.  Findings: There is no pneumothorax, pleural effusion or focal airspace consolidation. Cardiomediastinal silhouette is unremarkable. Pulmonary vasculature appears within normal limits. Regional bones appear grossly intact. There is a retrocardiac rounded density containing gas, likely large hiatal hernia.      Impression:  1. No acute cardiopulmonary abnormality. 2. Suspected hiatal hernia.  This report was finalized on 9/9/2022 7:34 PM by Omar Hughes MD.      CT Head Without Contrast Stroke Protocol    Result Date: 9/9/2022  Examination: CT HEAD WO CONTRAST STROKE PROTOCOL-  Date of Exam: 9/9/2022 6:06 PM  Indication: Neuro deficit, acute, stroke suspected.  Comparison: None available.  Technique: Axial noncontrast CT imaging of the head was performed. Automated exposure control and iterative reconstruction methods were used.  Findings: Superficial soft tissues appear within normal limits. The calvarium is intact.  There is complete filling of the right maxillary sinus and the right sphenoid sinus with mucosal disease. Moderate right ethmoid sinus mucosal disease. The frontal sinuses are aplastic. The mastoids appear well aerated. There is thinning of the orbital lenses bilaterally suggestive of prior lens replacement. There is no acute intracranial hemorrhage.  No mass effect or midline shift.  No abnormal extra-axial collections.  Gray-white differentiation is within normal limits.  There is mild patchy periventricular white matter hypoattenuation. Ventricular size and configuration is normal for age.       Impression:  1. No acute intracranial abnormality. 2. Mild chronic small vessel ischemic change.  3. Severe obstructive right-sided paranasal sinus mucosal disease.  This report was finalized on 9/9/2022 6:14 PM by Omar Hughes MD.      CT Angiogram Head w AI Analysis of LVO    Result Date: 9/9/2022  Examination: CT ANGIOGRAM NECK-, CT ANGIOGRAM HEAD W AI ANALYSIS OF LVO-  Date of Exam: 9/9/2022 6:13 PM  Indication: Stroke, follow up.  Comparison: None available.  Technique: Axial volumetric contrast-enhanced CT angiographic images of the head and neck were acquired utilizing 115 mL Isovue-370  IV contrast. 3-D reconstructions were created for interpretation. Automated exposure control and iterative reconstruction methods were used. AI analysis of LVO was utilized for the CTA Head imaging portion of the study.   Findings: Aortic arch: There is minimal plaque in the aortic arch.  There is 4 vessel arch anatomy with the left vertebral artery arising directly from the aortic arch. The right brachiocephalic and visualized bilateral subclavian arteries are within normal limits.  Right carotid: The right CCA arises as expected from the brachiocephalic trunk.  The CCA follows a normal course and appears normal caliber. There is minimal CCA calcified plaque. The carotid bifurcation is unremarkable.  The external carotid artery and distal branches appear within normal limits.  The cervical internal carotid artery follows a normal course and appears normal caliber throughout the neck and into the head.  The intracranial ICA segments appear within normal limits. The ophthalmic artery origin is normal.  The A1 and M1 segments appear within normal limits.  The visualized distal EDDIE and MCA branches appear patent.  There is  a patent  anterior communicating artery. There is a patent  posterior communicating artery.  Left carotid: The left CCA arises as expected from the aortic arch.  The CCA follows a normal course and appears normal caliber.  There is mild nonstenosing plaque at the carotid bifurcation and distal CCA. The  external carotid artery and distal branches appear within normal limits.  The cervical internal carotid artery follows a normal course and appears normal caliber throughout the neck and into the head.  The intracranial ICA segments appear within normal limits.  The ophthalmic artery origin is normal.  The A1 and M1 segments appear within normal limits.  The visualized distal EDDIE and MCA branches appear patent. There is a patent  posterior communicating artery.  Posterior circulation:Left vertebral artery arises from the aortic arch and the right vertebral artery arises from the subclavian artery. There is right vertebral artery dominance. The vertebral arteries follow a normal course and appear normal caliber throughout the neck and into the head.  The V4 segments are patent.  Visualized posterior inferior cerebellar arteries are within normal limits.  The basilar artery is normal caliber.  Superior cerebellar arteries are patent.  Bilateral P1 segments and posterior cerebral arteries appear within normal limits.   Nonvascular findings: Intracranial structures appear unremarkable. There is evidence of prior lens replacement. There is severe obstructive paranasal sinus mucosal disease throughout the right side. Mastoids are well aerated. Superficial soft tissues and underlying musculature appear within normal limits.  The lung apices are clear.  The thyroid and salivary glands appear unremarkable.  The suprahyoid and infrahyoid spaces of the neck appear unremarkable.  There is no evidence of cervical lymphadenopathy or a neck mass.  There are no acute osseous abnormalities or destructive bone lesions. There are moderate cervical degenerative changes. No high-grade spinal canal stenosis. There is 3 mm anterolisthesis of C4 on C5 and there is severe C5-C6 disc degeneration.      Impression:  1. Minimal atherosclerotic plaque without evidence of stenosis, occlusion, aneurysm or dissection in the neck or the head. 2.  Cervical degenerative changes. 3. 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  This report was finalized on 9/9/2022 6:57 PM by Omar Hughes MD.      CT CEREBRAL PERFUSION WITH & WITHOUT CONTRAST    Result Date: 9/9/2022  DATE OF EXAM: 9/9/2022 6:13 PM  PROCEDURE: CT CEREBRAL PERFUSION W WO CONTRAST-  INDICATIONS: Neuro deficit, acute, stroke suspected  COMPARISON: No comparisons available.  TECHNIQUE: Routine transaxial cuts were obtained through the head without administration of contrast. Routine transaxial cuts were then obtained through the head following the intravenous administration of 115 mL of Isovue 370. Core blood volume, core blood flow, mean transit time, and Tmax images were obtained utilizing the Rapid software protocol. A limited CT angiogram of the head was also performed to measure the blood vessel density.  The radiation dose reduction device was turned on for each scan per the ALARA (As Low as Reasonably Achievable) protocol.  FINDINGS: There is no significant perfusion abnormality in the brain. Cerebral blood flow, cerebral blood volume and mean transit time appear within normal limits.      Impression: No perfusion abnormalities in the brain. No evidence of brain risk or cortical infarct.  This report was finalized on 9/9/2022 6:46 PM by Omar Hughes MD.            I have reviewed the medications:  Scheduled Meds:hold, 1 each, Does not apply, BID  [START ON 9/11/2022] Pharmacy Consult, , Does not apply, Once  aspirin, 325 mg, Oral, Daily   Or  aspirin, 300 mg, Rectal, Daily  atorvastatin, 80 mg, Oral, Nightly  cefTRIAXone, 2 g, Intravenous, Q12H  cholecalciferol, 2,000 Units, Oral, Daily  multivitamin with minerals, 1 tablet, Oral, Daily  predniSONE, 5 mg, Oral, Daily  sodium chloride, 10 mL, Intravenous, Q12H  [START ON 9/11/2022] vancomycin, 1,000 mg, Intravenous, Q18H      Continuous Infusions:Pharmacy to dose vancomycin,   sodium chloride, 75 mL/hr, Last  Rate: 75 mL/hr (09/10/22 0230)      PRN Meds:.fluticasone  •  LORazepam  •  magnesium sulfate **OR** magnesium sulfate **OR** magnesium sulfate  •  Pharmacy to dose vancomycin  •  potassium chloride **OR** potassium chloride **OR** potassium chloride  •  potassium phosphate infusion greater than 15 mMoles **OR** potassium phosphate infusion greater than 15 mMoles **OR** potassium phosphate **OR** sodium phosphate IVPB **OR** sodium phosphate IVPB **OR** sodium phosphate IVPB  •  sodium chloride  •  sodium chloride    Assessment & Plan   Assessment & Plan     Active Hospital Problems    Diagnosis  POA   • AMS (altered mental status) [R41.82]  Yes   • Hypokalemia [E87.6]  Unknown   • Benign hypertension [I10]  Yes   • Hypercholesterolemia [E78.00]  Yes   • Rheumatoid arthritis (HCC) [M06.9]  Yes      Resolved Hospital Problems   No resolved problems to display.        Brief Hospital Course to date:  Vane Villavicencio is a 77 y.o. female w hx of HTN, HLD, SVT, RA who is presenting with altered mental status. Code stroke initiated in ED    Altered mental status  TIA vs CVA vs infectious encephalopathy  - CT of head, CT perfusion, CTA of head and neck show no significant stenosis or perfusion abnormalities, no acute intracranial findings  - MRI brain without contrast shows no acute infarct, shows chronic small vessel ischemic disease  - Consider EEG, defer to neuro  - UA negative  - UDS positive for benzo's and opiates: Vinny verified  - TSH: 2.070  - ASA given in the ED  - Consult PT/OT/SLP  - N.p.o. until passes bedside dysphagia screen  -Bubble echo  - Hold sedating medications  - Fall precautions  - Up with assistance  - Check Pro-Didier and lactic acid  --ID consulted given concern of infection in underlying chronic immunosuppression- LP pending. Empiric abx  --check resp panel pcr to look for possible viral etiology      Low back pain  --recent steroid injections  --MRI showing cauda equina compression at nerve roots,  neurosurgery said no acute intervention at this time.  They would happy to see her outpatient.    --No saddle paresthesia, spoke with  and has not had any incontinence of bladder or bowel today. She can lift her legs off bed currently with some difficulty   --consider consult inpatient if no improvement     hypokalemia  - Replace per protocol  - Check magnesium  - Telemetry monitoring  - Repeat labs in a.m.     essential hypertension  - BP on arrival 141/109  - Left for permissive hypertension     hyperlipidemia  - FLP shows LDL above goal  - High-dose statin      rheumatoid arthritis  - Started kevzara injections 2 weeks ago previously on orencia, received second injection prior to admission  - On a prednisone and Arava (cont prednisone, hold arava until infection ruled out)  - Follows with rheumatology at [facility]    Expected Discharge Location and Transportation: home  Expected Discharge Date: 9/14    DVT prophylaxis:  Mechanical DVT prophylaxis orders are present.          CODE STATUS:   Code Status and Medical Interventions:   Ordered at: 09/09/22 2660     Level Of Support Discussed With:    Patient     Code Status (Patient has no pulse and is not breathing):    CPR (Attempt to Resuscitate)     Medical Interventions (Patient has pulse or is breathing):    Full Support       Shannen Marks MD  09/10/22

## 2022-09-10 NOTE — THERAPY EVALUATION
Patient Name: Vane Villavicencio  : 1945    MRN: [id number]                              Today's Date: 9/10/2022       Admit Date: 2022    Visit Dx: No diagnosis found.  Patient Active Problem List   Diagnosis   • Supraventricular tachycardia (HCC)   • Rheumatoid arthritis (HCC)   • Hypercholesterolemia   • Benign hypertension   • Abnormal stress test   • AMS (altered mental status)   • Hypokalemia     Past Medical History:   Diagnosis Date   • Asthma    • Benign hypertension    • Disease of thyroid gland    • Family history of heart disease    • Family history of heart disease    • Hypercholesterolemia    • Rheumatoid arthritis (HCC)    • Rheumatoid arthritis (HCC)    • Supraventricular tachycardia (HCC)     first diagnosed 2012   • Supraventricular tachycardia (HCC)      Past Surgical History:   Procedure Laterality Date   • CARDIAC CATHETERIZATION N/A 7/10/2020    Procedure: Left Heart Cath;  Surgeon: Pankaj Pollard MD;  Location: [facility] CATH INVASIVE LOCATION;  Service: Cardiology;  Laterality: N/A;   • SINUS SURGERY        General Information     Row Name 09/10/22 1109          OT Time and Intention    Document Type evaluation  -TA     Mode of Treatment occupational therapy  -TA     Row Name 09/10/22 1109          General Information    Patient Profile Reviewed yes  -TA     Prior Level of Function independent:;all household mobility;community mobility;gait;transfer;bed mobility;ADL's  -TA     Existing Precautions/Restrictions fall  -TA     Barriers to Rehab cognitive status;medically complex  -TA     Row Name 09/10/22 1109          Occupational Profile    Reason for Services/Referral (Occupational Profile) fxl decline from PLOF  -TA     Patient Goals (Occupational Profile) return to PLOF  -TA     Row Name 09/10/22 1109          Living Environment    People in Home child(vonnie), adult;spouse  -TA     Row Name 09/10/22 1109          Home Main Entrance    Number of Stairs, Main Entrance one  -TA      Row Name 09/10/22 1109          Stairs Within Home, Primary    Number of Stairs, Within Home, Primary none  -TA     Row Name 09/10/22 1109          Cognition    Orientation Status (Cognition) oriented to;person;place;situation;time  Pt unable to recall birthdate and exhibited mild conversational confusion at times  -TA     Row Name 09/10/22 1109          Safety Issues, Functional Mobility    Safety Issues Affecting Function (Mobility) insight into deficits/self-awareness;safety precautions follow-through/compliance  -TA     Impairments Affecting Function (Mobility) cognition;endurance/activity tolerance;strength  -TA     Cognitive Impairments, Mobility Safety/Performance safety precaution follow-through;insight into deficits/self-awareness  -TA           User Key  (r) = Recorded By, (t) = Taken By, (c) = Cosigned By    Initials Name Provider Type    Jim Gutierres OT Occupational Therapist                 Mobility/ADL's     Row Name 09/10/22 1109          Bed Mobility    Comment, (Bed Mobility) RN cleared pt for bed level eval - on bedrest awaiting LP  -TA     Row Name 09/10/22 1109          Transfers    Comment, (Transfers) Deferred 2/2 bedrest  -TA     Row Name 09/10/22 1109          Activities of Daily Living    BADL Assessment/Intervention grooming;lower body dressing;toileting  -TA     Row Name 09/10/22 1109          Grooming Assessment/Training    [place] Level (Grooming) wash face, hands;set up;supervision;verbal cues  -TA     Position (Grooming) supine  -TA     Row Name 09/10/22 1109          Lower Body Dressing Assessment/Training    [place] Level (Lower Body Dressing) lower body dressing skills;moderate assist (50% patient effort)  clinical projection of fxl level  -TA     Row Name 09/10/22 1109          Toileting Assessment/Training    [place] Level (Toileting) toileting skills;minimum assist (75% patient effort)  Clinical projection of fxl level  -TA           User Key  (r) =  Recorded By, (t) = Taken By, (c) = Cosigned By    Initials Name Provider Type    Jim Gutierres OT Occupational Therapist               Obj/Interventions     Row Name 09/10/22 1109          Sensory Assessment (Somatosensory)    Sensory Assessment (Somatosensory) bilateral UE;sensation intact  -TA     [facility] Name 09/10/22 1109          Vision Assessment/Intervention    Visual Impairment/Limitations WFL;corrective lenses full-time  -TA     Row Name 09/10/22 1109          Range of Motion Comprehensive    General Range of Motion bilateral upper extremity ROM WFL  -TA     [facility] Name 09/10/22 1109          Strength Comprehensive (MMT)    General Manual Muscle Testing (MMT) Assessment upper extremity strength deficits identified  -TA     Comment, General Manual Muscle Testing (MMT) Assessment LUE 4+/5, RUE 4/5  -TA     [facility] Name 09/10/22 1109          Motor Skills    Motor Skills coordination  -TA     Coordination WFL  -TA     [facility] Name 09/10/22 1109          Balance    Comment, Balance Balance activities deferred 2/2 bedrest  -TA           User Key  (r) = Recorded By, (t) = Taken By, (c) = Cosigned By    Initials Name Provider Type    Jim Gutierres OT Occupational Therapist               Goals/Plan     Row Name 09/10/22 1109          Bed Mobility Goal 1 (OT)    Activity/Assistive Device (Bed Mobility Goal 1, OT) sit to supine;supine to sit  -TA     [place] Level/Cues Needed (Bed Mobility Goal 1, OT) independent  -TA     Time Frame (Bed Mobility Goal 1, OT) by discharge  -TA     Progress/Outcomes (Bed Mobility Goal 1, OT) goal ongoing  -TA     [facility] Name 09/10/22 1109          Transfer Goal 1 (OT)    Activity/Assistive Device (Transfer Goal 1, OT) sit-to-stand/stand-to-sit;bed-to-chair/chair-to-bed;toilet;walker, rolling  -TA     [place] Level/Cues Needed (Transfer Goal 1, OT) modified independence  -TA     Time Frame (Transfer Goal 1, OT) by discharge  -TA     Progress/Outcome (Transfer Goal 1, OT)  goal ongoing  -TA     Row Name 09/10/22 1109          Dressing Goal 1 (OT)    Activity/Device (Dressing Goal 1, OT) lower body dressing  -TA     [place]/Cues Needed (Dressing Goal 1, OT) modified independence  -TA     Time Frame (Dressing Goal 1, OT) by discharge  -TA     Progress/Outcome (Dressing Goal 1, OT) goal ongoing  -TA     Row Name 09/10/22 1109          Toileting Goal 1 (OT)    Activity/Device (Toileting Goal 1, OT) adjust/manage clothing;perform perineal hygiene  -TA     [place] Level/Cues Needed (Toileting Goal 1, OT) modified independence  -TA     Time Frame (Toileting Goal 1, OT) by discharge  -TA     Progress/Outcome (Toileting Goal 1, OT) goal ongoing  -TA     Row Name 09/10/22 1109          Therapy Assessment/Plan (OT)    Planned Therapy Interventions (OT) activity tolerance training;BADL retraining;functional balance retraining;occupation/activity based interventions;patient/caregiver education/training;ROM/therapeutic exercise;strengthening exercise;transfer/mobility retraining  -TA           User Key  (r) = Recorded By, (t) = Taken By, (c) = Cosigned By    Initials Name Provider Type    TA Jim Jj, OT Occupational Therapist               Clinical Impression     Row Name 09/10/22 1109          Pain Assessment    Pretreatment Pain Rating 0/10 - no pain  -TA     Posttreatment Pain Rating 0/10 - no pain  -TA     Pre/Posttreatment Pain Comment Pt tolerated, denied pain  -TA     Row Name 09/10/22 1109          Plan of Care Review    Plan of Care Reviewed With patient  -TA     Outcome Evaluation VSS; Pt presents with fxl decline from PLOF, deficits in ADL performance, fxl mobility, occupational endurance. Pt limited by weakness, mild tremors, mild confusion. RN cleared pt for bed level eval/pt on bedrest. Pt set up/SBA for grooming, anticipate Mod A LBD and Min A with toileting. Pt will benefit from skilled OT services to address deficits, facilitate increased fxl I. Recommend home  with assist and  services.  -TA     Row Name 09/10/22 1109          Therapy Assessment/Plan (OT)    Patient/Family Therapy Goal Statement (OT) Return home  -TA     Rehab Potential (OT) good, to achieve stated therapy goals  -TA     Criteria for Skilled Therapeutic Interventions Met (OT) skilled treatment is necessary  -TA     Therapy Frequency (OT) daily  -TA     Predicted Duration of Therapy Intervention (OT) 1 week  -TA     Row Name 09/10/22 1109          Therapy Plan Review/Discharge Plan (OT)    Anticipated Discharge Disposition (OT) home with assist;home with home health  -TA     Row Name 09/10/22 1109          Vital Signs    Pre Systolic BP Rehab --  VSS; RN cleared pt for tx  -TA     O2 Delivery Pre Treatment room air  -TA     O2 Delivery Intra Treatment room air  -TA     O2 Delivery Post Treatment room air  -TA     Pre Patient Position Supine  -TA     Intra Patient Position Supine  -TA     Post Patient Position Supine  -TA     Row Name 09/10/22 1109          Positioning and Restraints    Pre-Treatment Position in bed  -TA     Post Treatment Position bed  -TA     In Bed notified nsg;supine;call light within reach;encouraged to call for assist;exit alarm on;side rails up x2;SCD pump applied;legs elevated  -TA           User Key  (r) = Recorded By, (t) = Taken By, (c) = Cosigned By    Initials Name Provider Type    Jim Gutierres, OT Occupational Therapist               Outcome Measures     Row Name 09/10/22 1109          How much help from another is currently needed...    Putting on and taking off regular lower body clothing? 3  -TA     Bathing (including washing, rinsing, and drying) 3  -TA     Toileting (which includes using toilet bed pan or urinal) 3  -TA     Putting on and taking off regular upper body clothing 3  -TA     Taking care of personal grooming (such as brushing teeth) 3  -TA     Eating meals 4  -TA     AM-PAC 6 Clicks Score (OT) 19  -TA     Row Name 09/10/22 0845          How much  help from another person do you currently need...    Turning from your back to your side while in flat bed without using bedrails? 2  -CA     Moving from lying on back to sitting on the side of a flat bed without bedrails? 2  -CA     Moving to and from a bed to a chair (including a wheelchair)? 2  -CA     Standing up from a chair using your arms (e.g., wheelchair, bedside chair)? 2  -CA     Climbing 3-5 steps with a railing? 2  -CA     To walk in hospital room? 2  -CA     AM-PAC 6 Clicks Score (PT) 12  -CA     Highest level of mobility 4 --> Transferred to chair/commode  -CA     Row Name 09/10/22 1109          Modified [place] Scale    Pre-Stroke Modified [place] Scale 0 - No Symptoms at all.  -TA     Modified [place] Scale 3 - Moderate disability.  Requiring some help, but able to walk without assistance.  -TA     Row Name 09/10/22 1109          Functional Assessment    Outcome Measure Options AM-PAC 6 Clicks Daily Activity (OT);Modified [place]  -TA           User Key  (r) = Recorded By, (t) = Taken By, (c) = Cosigned By    Initials Name Provider Type    Mo Yeager, RN Registered Nurse    Jim Gutierres OT Occupational Therapist                Occupational Therapy Education                 Title: PT OT SLP Therapies (Done)     Topic: Occupational Therapy (Done)     Point: ADL training (Done)     Description:   Instruct learner(s) on proper safety adaptation and remediation techniques during self care or transfers.   Instruct in proper use of assistive devices.              Learning Progress Summary           Patient Acceptance, E, VU,NR by TA at 9/10/2022 1157                   Point: Home exercise program (Done)     Description:   Instruct learner(s) on appropriate technique for monitoring, assisting and/or progressing therapeutic exercises/activities.              Learning Progress Summary           Patient Acceptance, E, VU,NR by TA at 9/10/2022 1157                   Point: Precautions (Done)      Description:   Instruct learner(s) on prescribed precautions during self-care and functional transfers.              Learning Progress Summary           Patient Acceptance, E, VU,NR by TA at 9/10/2022 1157                   Point: Body mechanics (Done)     Description:   Instruct learner(s) on proper positioning and spine alignment during self-care, functional mobility activities and/or exercises.              Learning Progress Summary           Patient Acceptance, E, VU,NR by TA at 9/10/2022 1157                               User Key     Initials Effective Dates Name Provider Type Discipline     06/16/21 -  Jim Jj, OT Occupational Therapist OT              OT Recommendation and Plan  Planned Therapy Interventions (OT): activity tolerance training, BADL retraining, functional balance retraining, occupation/activity based interventions, patient/caregiver education/training, ROM/therapeutic exercise, strengthening exercise, transfer/mobility retraining  Therapy Frequency (OT): daily  Plan of Care Review  Plan of Care Reviewed With: patient  Outcome Evaluation: VSS; Pt presents with fxl decline from PLOF, deficits in ADL performance, fxl mobility, occupational endurance. Pt limited by weakness, mild tremors, mild confusion. RN cleared pt for bed level eval/pt on bedrest. Pt set up/SBA for grooming, anticipate Mod A LBD and Min A with toileting. Pt will benefit from skilled OT services to address deficits, facilitate increased fxl I. Recommend home with assist and HH services.     Time Calculation:    Time Calculation- OT     Row Name 09/10/22 1109             Time Calculation- OT    OT Start Time 1109  ttc 0 minutes  -TA      Total Timed Code Minutes- OT 0 minute(s)  -TA      OT Received On 09/10/22  -TA      OT Goal Re-Cert Due Date 09/20/22  -TA              Untimed Charges    OT Eval/Re-eval Minutes 48  -TA              Total Minutes    Untimed Charges Total Minutes 48  -TA       Total Minutes 48   -ERIC            User Key  (r) = Recorded By, (t) = Taken By, (c) = Cosigned By    Initials Name Provider Type    TA Jim Jj, OT Occupational Therapist              Therapy Charges for Today     Code Description Service Date Service Provider Modifiers Qty    [id number]  OT EVAL MOD COMPLEXITY 4 9/10/2022 Jim Jj OT GO 1               Jim Jj OT  9/10/2022

## 2022-09-10 NOTE — H&P
"    [facility] Medicine Services  HISTORY AND PHYSICAL    Patient Name: Vane Villavicencio  : 1945  MRN: [id number]  Primary Care Physician: Rolando Jeronimo MD  Date of admission: 2022    Subjective   Subjective     Chief Complaint:  AMS    HPI:  Vane Villavicencio is a 77 y.o. female with past medical history significant for essential hypertension, hyperlipidemia, SVT, RA and hypothyroidism presents the ED accompanied by  due to altered mental status.  Patient's  at bedside reports around midday yesterday he noticed patient was significantly weaker overall and not making sense.  At baseline patient ambulates with a cane.  However yesterday  states she was unable to stand without assistance.  He notes today she was unable to tell him where she was nor the month or the year.  Patient is currently alert and oriented x3.  She denies any known fever, chills, nausea, vomiting, abdominal pain, shortness of air, numbness/tingling, vision changes, headache, dysuria, diarrhea, headache or chest pain.  She does however note ongoing back pain in which she has received epidural injections approximately 1 to 2 weeks ago.  She additionally notes worsening weakness of her right lower extremity compared to left.   at bedside notes patient developed a generalized tremor yesterday that has persisted into today.  He additionally reports she complains at home \"freezing\".  Upon arrival to the ED stroke team was called.  Patient was taken to CT scanner for further imaging.  CTA of head and neck showed minimal atherosclerotic plaque without evidence of stenosis, occlusion, aneurysm or dissection of the neck or head.  Cervical degenerative changes.  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  CT perfusion was unremarkable.  CT of head without contrast showed no acute intracranial abnormality.  Patient will be admitted to [facility] under the " care of the hospitalist for further evaluation and treatment.        Review of Systems   Constitutional: Positive for activity change, appetite change and chills. Negative for diaphoresis, fatigue, fever and unexpected weight change.   HENT: Negative.    Eyes: Negative for photophobia and visual disturbance.   Respiratory: Negative for cough and shortness of breath.    Cardiovascular: Positive for leg swelling. Negative for palpitations.   Gastrointestinal: Negative for abdominal distention, abdominal pain, blood in stool, constipation, diarrhea, nausea and vomiting.   Genitourinary: Negative.    Musculoskeletal: Positive for back pain. Negative for neck pain and neck stiffness.   Skin: Negative.    Neurological: Positive for tremors and weakness. Negative for dizziness, syncope, speech difficulty, numbness and headaches.   Psychiatric/Behavioral: Positive for confusion.        All other systems reviewed and are negative.     Personal History     Past Medical History:   Diagnosis Date   • Asthma    • Benign hypertension    • Disease of thyroid gland    • Family history of heart disease    • Family history of heart disease    • Hypercholesterolemia    • Rheumatoid arthritis (HCC)    • Rheumatoid arthritis (HCC)    • Supraventricular tachycardia (HCC)     first diagnosed 06/01/2012   • Supraventricular tachycardia (HCC)              Past Surgical History:   Procedure Laterality Date   • CARDIAC CATHETERIZATION N/A 7/10/2020    Procedure: Left Heart Cath;  Surgeon: Pankaj Pollard MD;  Location: [facility] CATH INVASIVE LOCATION;  Service: Cardiology;  Laterality: N/A;   • SINUS SURGERY         Family History:  family history includes Heart disease in an other family member; Hypertension in her sister. Otherwise pertinent FHx was reviewed and unremarkable.     Social History:  reports that she has never smoked. She has never used smokeless tobacco. She reports that she does not drink alcohol and does not use  drugs.  Social History     Social History Narrative   • Not on file       Medications:  Abatacept, LORazepam, amLODIPine, aspirin, cholecalciferol, clopidogrel, fluticasone, hydroCHLOROthiazide, leflunomide, levothyroxine, metoprolol tartrate, multivitamin with minerals, potassium chloride, pravastatin, predniSONE, and traMADol    No Known Allergies    Objective   Objective     Vital Signs:   Temp:  [98.7 °F (37.1 °C)] 98.7 °F (37.1 °C)  Heart Rate:  [83-99] 83  Resp:  [18] 18  BP: (141-156)/() 147/82    Physical Exam  Vitals and nursing note reviewed.   Constitutional:       General: She is not in acute distress.     Appearance: Normal appearance. She is not ill-appearing, toxic-appearing or diaphoretic.   HENT:      Head: Normocephalic and atraumatic.      Nose: Nose normal.      Mouth/Throat:      Mouth: Mucous membranes are dry.   Eyes:      Extraocular Movements: Extraocular movements intact.      Conjunctiva/sclera: Conjunctivae normal.      Pupils: Pupils are equal, round, and reactive to light.   Cardiovascular:      Rate and Rhythm: Regular rhythm.      Pulses: Normal pulses.      Heart sounds: Normal heart sounds.   Pulmonary:      Effort: Pulmonary effort is normal.      Breath sounds: Normal breath sounds.   Abdominal:      General: Bowel sounds are normal. There is no distension.      Palpations: Abdomen is soft. There is no mass.      Tenderness: There is no abdominal tenderness. There is no right CVA tenderness, left CVA tenderness, guarding or rebound.      Hernia: No hernia is present.   Musculoskeletal:         General: Tenderness present. No swelling, deformity or signs of injury. Normal range of motion.      Cervical back: Normal range of motion and neck supple.      Right lower leg: Edema present.      Left lower leg: No edema.      Comments: TTP to lumbar spine   Required assistance to sit up in bed, had severe pain to lower back when sitting up.    Skin:     General: Skin is warm and  dry.   Neurological:      Mental Status: She is alert.      Comments: Speech clear, no facial droop, tongue midline,  equal, RLE weaker compared to LLE.  Tremor to RUE    Psychiatric:         Mood and Affect: Mood normal.         Behavior: Behavior normal.         Thought Content: Thought content normal.            Results Reviewed:  I have personally reviewed most recent indicated data and agree with findings including:  [x]  Laboratory  [x]  Radiology  [x]  EKG/Telemetry  []  Pathology  []  Cardiac/Vascular Studies  []  Old records  []  Other:  Most pertinent findings include:      LAB RESULTS:      Lab 09/09/22 1818 09/09/22 1817   WBC  --  7.11   HEMOGLOBIN  --  13.6   HEMOGLOBIN, POC 15.0  --    HEMATOCRIT  --  40.0   HEMATOCRIT POC 44  --    PLATELETS  --  163   NEUTROS ABS  --  2.24   IMMATURE GRANS (ABS)  --  0.02   LYMPHS ABS  --  3.51*   MONOS ABS  --  1.19*   EOS ABS  --  0.04   MCV  --  84.6   PROTIME 11.9*  --    APTT  --  28.8         Lab 09/09/22 1818 09/09/22 1817   SODIUM  --  133*   POTASSIUM  --  3.1*   CHLORIDE  --  87*   CO2  --  25.0   ANION GAP  --  21.0*   BUN  --  11   CREATININE 0.80 0.84   EGFR 76.9 72.6   GLUCOSE  --  104*   CALCIUM  --  10.2   MAGNESIUM  --  1.6   TSH  --  2.070         Lab 09/09/22 1817   ALT (SGPT) 34*   AST (SGOT) 43*         Lab 09/09/22 2116 09/09/22 1818 09/09/22 1817   TROPONIN T <0.010  --  <0.010   PROTIME  --  11.9*  --    INR  --  1.0  --                  Brief Urine Lab Results  (Last result in the past 365 days)      Color   Clarity   Blood   Leuk Est   Nitrite   Protein   CREAT   Urine HCG        09/09/22 1930 Yellow   Clear   Negative   Negative   Negative   Negative               Microbiology Results (last 10 days)     Procedure Component Value - Date/Time    COVID PRE-OP / PRE-PROCEDURE SCREENING ORDER (NO ISOLATION) - Swab, Nasopharynx [[id number]]  (Normal) Collected: 09/09/22 2115    Lab Status: Final result Specimen: Swab from Nasopharynx  Updated: 09/09/22 2146    Narrative:      The following orders were created for panel order COVID PRE-OP / PRE-PROCEDURE SCREENING ORDER (NO ISOLATION) - Swab, Nasopharynx.  Procedure                               Abnormality         Status                     ---------                               -----------         ------                     COVID-19 and FLU A/B PCR...[[id number]]  Normal              Final result                 Please view results for these tests on the individual orders.    COVID-19 and FLU A/B PCR - Swab, Nasopharynx [[id number]]  (Normal) Collected: 09/09/22 2115    Lab Status: Final result Specimen: Swab from Nasopharynx Updated: 09/09/22 2146     COVID19 Not Detected     Influenza A PCR Not Detected     Influenza B PCR Not Detected    Narrative:      Fact sheet for providers: https://www.fda.gov/media/350230/download    Fact sheet for patients: https://www.fda.gov/media/370059/download    Test performed by PCR.          CT Angiogram Neck    Result Date: 9/9/2022  Examination: CT ANGIOGRAM NECK-, CT ANGIOGRAM HEAD W AI ANALYSIS OF LVO-  Date of Exam: 9/9/2022 6:13 PM  Indication: Stroke, follow up.  Comparison: None available.  Technique: Axial volumetric contrast-enhanced CT angiographic images of the head and neck were acquired utilizing 115 mL Isovue-370  IV contrast. 3-D reconstructions were created for interpretation. Automated exposure control and iterative reconstruction methods were used. AI analysis of LVO was utilized for the CTA Head imaging portion of the study.   Findings: Aortic arch: There is minimal plaque in the aortic arch.  There is 4 vessel arch anatomy with the left vertebral artery arising directly from the aortic arch. The right brachiocephalic and visualized bilateral subclavian arteries are within normal limits.  Right carotid: The right CCA arises as expected from the brachiocephalic trunk.  The CCA follows a normal course and appears normal caliber. There is  minimal CCA calcified plaque. The carotid bifurcation is unremarkable.  The external carotid artery and distal branches appear within normal limits.  The cervical internal carotid artery follows a normal course and appears normal caliber throughout the neck and into the head.  The intracranial ICA segments appear within normal limits. The ophthalmic artery origin is normal.  The A1 and M1 segments appear within normal limits.  The visualized distal EDDIE and MCA branches appear patent.  There is  a patent  anterior communicating artery. There is a patent  posterior communicating artery.  Left carotid: The left CCA arises as expected from the aortic arch.  The CCA follows a normal course and appears normal caliber.  There is mild nonstenosing plaque at the carotid bifurcation and distal CCA. The external carotid artery and distal branches appear within normal limits.  The cervical internal carotid artery follows a normal course and appears normal caliber throughout the neck and into the head.  The intracranial ICA segments appear within normal limits.  The ophthalmic artery origin is normal.  The A1 and M1 segments appear within normal limits.  The visualized distal EDDIE and MCA branches appear patent. There is a patent  posterior communicating artery.  Posterior circulation:Left vertebral artery arises from the aortic arch and the right vertebral artery arises from the subclavian artery. There is right vertebral artery dominance. The vertebral arteries follow a normal course and appear normal caliber throughout the neck and into the head.  The V4 segments are patent.  Visualized posterior inferior cerebellar arteries are within normal limits.  The basilar artery is normal caliber.  Superior cerebellar arteries are patent.  Bilateral P1 segments and posterior cerebral arteries appear within normal limits.   Nonvascular findings: Intracranial structures appear unremarkable. There is evidence of prior lens replacement.  There is severe obstructive paranasal sinus mucosal disease throughout the right side. Mastoids are well aerated. Superficial soft tissues and underlying musculature appear within normal limits.  The lung apices are clear.  The thyroid and salivary glands appear unremarkable.  The suprahyoid and infrahyoid spaces of the neck appear unremarkable.  There is no evidence of cervical lymphadenopathy or a neck mass.  There are no acute osseous abnormalities or destructive bone lesions. There are moderate cervical degenerative changes. No high-grade spinal canal stenosis. There is 3 mm anterolisthesis of C4 on C5 and there is severe C5-C6 disc degeneration.      Impression:  1. Minimal atherosclerotic plaque without evidence of stenosis, occlusion, aneurysm or dissection in the neck or the head. 2. Cervical degenerative changes. 3. 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  This report was finalized on 9/9/2022 6:57 PM by Omar Hughes MD.      XR Chest 1 View    Result Date: 9/9/2022  Examination: XR CHEST 1 VW-  Date of Exam: 9/9/2022 6:44 PM  Indication: Acute Stroke Protocol (onset < 12 hrs).  Comparison: None available.  Technique: Single radiographic view of the chest was obtained.  Findings: There is no pneumothorax, pleural effusion or focal airspace consolidation. Cardiomediastinal silhouette is unremarkable. Pulmonary vasculature appears within normal limits. Regional bones appear grossly intact. There is a retrocardiac rounded density containing gas, likely large hiatal hernia.      Impression:  1. No acute cardiopulmonary abnormality. 2. Suspected hiatal hernia.  This report was finalized on 9/9/2022 7:34 PM by Omar Hughes MD.      CT Head Without Contrast Stroke Protocol    Result Date: 9/9/2022  Examination: CT HEAD WO CONTRAST STROKE PROTOCOL-  Date of Exam: 9/9/2022 6:06 PM  Indication: Neuro deficit, acute, stroke suspected.  Comparison: None available.  Technique: Axial  noncontrast CT imaging of the head was performed. Automated exposure control and iterative reconstruction methods were used.  Findings: Superficial soft tissues appear within normal limits. The calvarium is intact.  There is complete filling of the right maxillary sinus and the right sphenoid sinus with mucosal disease. Moderate right ethmoid sinus mucosal disease. The frontal sinuses are aplastic. The mastoids appear well aerated. There is thinning of the orbital lenses bilaterally suggestive of prior lens replacement. There is no acute intracranial hemorrhage.  No mass effect or midline shift.  No abnormal extra-axial collections.  Gray-white differentiation is within normal limits.  There is mild patchy periventricular white matter hypoattenuation. Ventricular size and configuration is normal for age.       Impression:  1. No acute intracranial abnormality. 2. Mild chronic small vessel ischemic change. 3. Severe obstructive right-sided paranasal sinus mucosal disease.  This report was finalized on 9/9/2022 6:14 PM by Omar Hughes MD.      CT Angiogram Head w AI Analysis of LVO    Result Date: 9/9/2022  Examination: CT ANGIOGRAM NECK-, CT ANGIOGRAM HEAD W AI ANALYSIS OF LVO-  Date of Exam: 9/9/2022 6:13 PM  Indication: Stroke, follow up.  Comparison: None available.  Technique: Axial volumetric contrast-enhanced CT angiographic images of the head and neck were acquired utilizing 115 mL Isovue-370  IV contrast. 3-D reconstructions were created for interpretation. Automated exposure control and iterative reconstruction methods were used. AI analysis of LVO was utilized for the CTA Head imaging portion of the study.   Findings: Aortic arch: There is minimal plaque in the aortic arch.  There is 4 vessel arch anatomy with the left vertebral artery arising directly from the aortic arch. The right brachiocephalic and visualized bilateral subclavian arteries are within normal limits.  Right carotid: The right CCA arises  as expected from the brachiocephalic trunk.  The CCA follows a normal course and appears normal caliber. There is minimal CCA calcified plaque. The carotid bifurcation is unremarkable.  The external carotid artery and distal branches appear within normal limits.  The cervical internal carotid artery follows a normal course and appears normal caliber throughout the neck and into the head.  The intracranial ICA segments appear within normal limits. The ophthalmic artery origin is normal.  The A1 and M1 segments appear within normal limits.  The visualized distal EDDIE and MCA branches appear patent.  There is  a patent  anterior communicating artery. There is a patent  posterior communicating artery.  Left carotid: The left CCA arises as expected from the aortic arch.  The CCA follows a normal course and appears normal caliber.  There is mild nonstenosing plaque at the carotid bifurcation and distal CCA. The external carotid artery and distal branches appear within normal limits.  The cervical internal carotid artery follows a normal course and appears normal caliber throughout the neck and into the head.  The intracranial ICA segments appear within normal limits.  The ophthalmic artery origin is normal.  The A1 and M1 segments appear within normal limits.  The visualized distal EDDIE and MCA branches appear patent. There is a patent  posterior communicating artery.  Posterior circulation:Left vertebral artery arises from the aortic arch and the right vertebral artery arises from the subclavian artery. There is right vertebral artery dominance. The vertebral arteries follow a normal course and appear normal caliber throughout the neck and into the head.  The V4 segments are patent.  Visualized posterior inferior cerebellar arteries are within normal limits.  The basilar artery is normal caliber.  Superior cerebellar arteries are patent.  Bilateral P1 segments and posterior cerebral arteries appear within normal limits.    Nonvascular findings: Intracranial structures appear unremarkable. There is evidence of prior lens replacement. There is severe obstructive paranasal sinus mucosal disease throughout the right side. Mastoids are well aerated. Superficial soft tissues and underlying musculature appear within normal limits.  The lung apices are clear.  The thyroid and salivary glands appear unremarkable.  The suprahyoid and infrahyoid spaces of the neck appear unremarkable.  There is no evidence of cervical lymphadenopathy or a neck mass.  There are no acute osseous abnormalities or destructive bone lesions. There are moderate cervical degenerative changes. No high-grade spinal canal stenosis. There is 3 mm anterolisthesis of C4 on C5 and there is severe C5-C6 disc degeneration.      Impression:  1. Minimal atherosclerotic plaque without evidence of stenosis, occlusion, aneurysm or dissection in the neck or the head. 2. Cervical degenerative changes. 3. 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  This report was finalized on 9/9/2022 6:57 PM by Omar Hughes MD.      CT CEREBRAL PERFUSION WITH & WITHOUT CONTRAST    Result Date: 9/9/2022  DATE OF EXAM: 9/9/2022 6:13 PM  PROCEDURE: CT CEREBRAL PERFUSION W WO CONTRAST-  INDICATIONS: Neuro deficit, acute, stroke suspected  COMPARISON: No comparisons available.  TECHNIQUE: Routine transaxial cuts were obtained through the head without administration of contrast. Routine transaxial cuts were then obtained through the head following the intravenous administration of 115 mL of Isovue 370. Core blood volume, core blood flow, mean transit time, and Tmax images were obtained utilizing the Rapid software protocol. A limited CT angiogram of the head was also performed to measure the blood vessel density.  The radiation dose reduction device was turned on for each scan per the ALARA (As Low as Reasonably Achievable) protocol.  FINDINGS: There is no significant  perfusion abnormality in the brain. Cerebral blood flow, cerebral blood volume and mean transit time appear within normal limits.      Impression: No perfusion abnormalities in the brain. No evidence of brain risk or cortical infarct.  This report was finalized on 9/9/2022 6:46 PM by Omar Hughes MD.            Assessment & Plan   Assessment & Plan       Rheumatoid arthritis (HCC)    Hypercholesterolemia    Benign hypertension    AMS (altered mental status)    Hypokalemia      77-year-old female presents the ED accompanied by  due to altered mental status that started midday yesterday.    1) altered mental status      Differentials include TIA versus CVA versus infection  - CT of head, CT perfusion, CTA of head and neck as mentioned above  - MRI brain without contrast pending  - Consider EEG, defer to neuro  - Check FLP, hemoglobin A1c, B12, folate, ammonia  - UA negative  - UDS positive for benzo's and opiates: Vinny verified  - TSH: 2.070  - ASA given in the ED  - Consult PT/OT/SLP  - N.p.o. until passes bedside dysphagia screen  - Neuro to see in a.m.  -Bubble echo in a.m.  - Hold sedating medications  - Obtain MRI of lumbar spine  - Fall precautions  - Up with assistance  - Check Pro-Didier and lactic acid  --temp 99, suspect she had rigors earlier. concern for occult infection given recent lumbar spinal injection and now confusion.  Procal, wbc, and temp can all be normal early in infection when immunosuppressed on biologic, Will get ID to see.  Could have reactivation of zoster in CNS as well. Start vanc, rocephin, and one time dose acyclovir.  Ordred LP in radiology. NPO until procedure.  --check resp panel pcr to look for possible viral etiology    2) Low back pain  --recent steroid injections  --MRI showing cauda equina compression at nerve roots, neurosurgery said no acute intervention at this time.  They would happy to see her outpatient.    --No saddle paresthesia, spoke with  and has not  had any incontinence of bladder or bowel today. She can lift her legs off bed currently with some difficulty   --consider consult inpatient if no improvement    3) hypokalemia  - Replace per protocol  - Check magnesium  - Telemetry monitoring  - Repeat labs in a.m.    4) essential hypertension  - BP on arrival 141/109  - Left for permissive hypertension    5) hyperlipidemia  - FLP in a.m.  - High-dose statin    6) rheumatoid arthritis  - Started kevzara injections 2 weeks ago previously on orencia, received second injection on Thursday  - On a prednisone and Arava (cont prednisone, hold arava until infection ruled out)  - [facility]s [facility]ology at [facility]      DVT prophylaxis:  scds    CODE STATUS:  Full Code        This note has been completed as part of a split-shared workflow.     Signature: Electronically signed by MIHAI Mae, 09/09/22, 10:56 PM EDT.          Attending   Admission Attestation       I have performed an independent face-to-face diagnostic evaluation including performing an independent physical examination as documented here.  The documented plan of care above was reviewed and developed with the advanced practice clinician (APC).      Brief Summary Statement:   Vane Villavicencio is a 77 y.o. female with past medical history of RA recently started on Kevzara, SVT, hyperlipidemia, hypothyroidism, essential hypertension, and chronic low back pain for which she is receiving steroid injections who presents to the ER with increased weakness and confusion.    Patient has chronic low back pain and is following with pain management.  They started steroid injections about 2 weeks ago.  She also was recently switched from Orencia to Kevzara for her rheumatoid arthritis.  Today, patient had increased weakness in comparison to her baseline and was unable to stand.  Noted to have increased weakness in the right lower extremity in comparison to the left and was worked up as a stroke alert and  "in ER.  Today, patient noted to have generalized shaking/rigors and was complaining that she was \"freezing\".    MRI of the brain is pending, MRI lumbar spine was ordered and shows cauda equina compression at the roots.  Neurosurgery notified and states that this is chronic and does not require any emergent intervention.  ID has been consulted for the a.m. for possible CNS infection.    Remainder of detailed HPI is as noted by APC and has been reviewed and/or edited by me for completeness.    Attending Physical Exam:  Temp:  [98.7 °F (37.1 °C)-99.1 °F (37.3 °C)] 99.1 °F (37.3 °C)  Heart Rate:  [83-99] 83  Resp:  [18] 18  BP: (139-156)/() 139/89    Constitutional: Awake, alert, confused  Eyes: PERRLA, sclerae anicteric, no conjunctival injection  HENT: NCAT, mucous membranes moist  Neck: Supple, no thyromegaly, no lymphadenopathy, trachea midline  Respiratory: Clear to auscultation bilaterally, nonlabored respirations   Cardiovascular: RRR, no murmurs, rubs, or gallops, palpable pedal pulses bilaterally  Gastrointestinal: Positive bowel sounds, soft, nontender, nondistended  Musculoskeletal: No bilateral ankle edema, no clubbing or cyanosis to extremities  Psychiatric: Appropriate affect, cooperative  Neurologic: Oriented to person and place, weakness in LE b/l, plantar and dorsi flexion with some difficulty, able to elevate , Cranial Nerves grossly intact to confrontation, speech clear  Skin: No rashes      Brief Assessment/Plan :  See detailed assessment and plan developed with APC which I have reviewed and/or edited for completeness.        Admission Status: I believe that this patient meets INPATIENT status due to ams, concern for occult cns infection from recent injections on biologic, .  I feel patient’s risk for adverse outcomes and need for care warrant INPATIENT evaluation and I predict the patient’s care encounter to likely last beyond 2 midnights.        Chapincito Ramirez DO  09/10/22                    "

## 2022-09-10 NOTE — PROGRESS NOTES
Neurology       Patient Care Team:  Rolando Jeronimo MD as PCP - General (Internal Medicine)    Chief complaint:   Chief Complaint   Patient presents with   • Stroke        History: Altered mental status.  77-year-old right-handed white female diagnosis of hypertension, hyperlipidemia, SVT, tremors, chronic back pain, rheumatoid arthritis hypothyroidism.  She came in with altered mental status.  She did not make any sense according to the  day before when she was talking and she was unusually weak all over.  She was not able to stand without assistance.  Usually she is ambulating with the help of a cane at home.  She had 1 episode of incontinence of the urine and feces.  She could not make it to the bathroom.  There was no acute stroke on the MRI.  No significant vascular disease of the cervical carotids or intracranial blood vessels or vertebrals.  Past Medical History:   Diagnosis Date   • Asthma    • Benign hypertension    • Disease of thyroid gland    • Family history of heart disease    • Family history of heart disease    • Hypercholesterolemia    • Rheumatoid arthritis (HCC)    • Rheumatoid arthritis (HCC)    • Supraventricular tachycardia (HCC)     first diagnosed 06/01/2012   • Supraventricular tachycardia (HCC)        Vital Signs   Vitals:    09/09/22 2200 09/10/22 0032 09/10/22 0707 09/10/22 0900   BP: 147/82 139/89 130/68    BP Location:  Left arm Right arm    Patient Position:  Lying Lying    Pulse:  83 93 85   Resp:  18 18    Temp:  99.1 °F (37.3 °C) 98.9 °F (37.2 °C)    TempSrc:  Oral Oral    SpO2: 99% 96% 96% 95%   Weight:  63.7 kg (140 lb 6.4 oz)     Height:           Physical Exam:   General:       Awake and alert however disoriented x3.      Neuro: Very minimal spontaneous speech.  Follows all the commands.  No focal deficits.  Reflexes reduced.  Deformities are noted.    Results Review:    Results from last 7 days   Lab Units 09/10/22  0437   WBC 10*3/mm3 5.91   HEMOGLOBIN g/dL 11.9*    HEMATOCRIT % 34.9   PLATELETS 10*3/mm3 151     Results from last 7 days   Lab Units 09/10/22  0437 09/09/22  1818 09/09/22 1817   SODIUM mmol/L 133*  133*  --  133*   POTASSIUM mmol/L 2.6*  --  3.1*   CHLORIDE mmol/L 90*  --  87*   CO2 mmol/L 29.0  --  25.0   BUN mg/dL 11  --  11   CREATININE mg/dL 0.74 0.80 0.84   CALCIUM mg/dL 9.3  --  10.2   BILIRUBIN mg/dL 0.6  --   --    ALK PHOS U/L 35*  --   --    ALT (SGPT) U/L 25  --  34*   AST (SGOT) U/L 39*  --  43*   GLUCOSE mg/dL 106*  --  104*       Imaging Results (Last 24 Hours)     Procedure Component Value Units Date/Time    MRI Brain Without Contrast [[id number]] Collected: 09/10/22 0344     Updated: 09/10/22 0348    Narrative:      EXAMINATION: MRI BRAIN WITHOUT CONTRAST.      DATE: 9/10/2022 2:56 AM     INDICATION: Stroke follow-up.     COMPARISON: CT 9/9/2022     TECHNIQUE:  Sagittal T1, axial T1, T2, FLAIR, diffusion, SWI, coronal T1 images through the brain. No contrast.        FINDINGS:      Intracranial contents:    Ventricular and cisternal spaces are prominent from volume loss. Mild periventricular and subcortical white matter T2 hyperintensities, also noted in the jade. No dominant mass, midline shift, hydrocephalus, extra axial fluid collection or acute   hemorrhage.  Flow voids of the proximal major cerebral arteries are patent on T2 images.     Diffusion sequence: No focus of restricted diffusion to suggest acute ischemia. Limited due to artifacts.    Blood sensitive sequence: No significant blood product.    Extracranial soft tissues:    Mild mucosal thickening right frontal sinus. Opacified right ethmoid air cells. Opacified right maxillary sinus. Opacified right sphenoid sinus. Mild mucosal thickening left maxillary sinus and left sphenoid sinus. Retention cyst or polyp inferior left   maxillary sinus. Increased signal right greater than left mastoid air cells. Cataract surgery.      Impression:        1. No acute infarct.  2. Mild changes  chronic small vessel ischemic disease. Volume loss.  3. Severe right-sided paranasal sinus disease. Mastoid effusions.       Electronically signed by:  Huey Briones M.D.    9/10/2022 1:47 AM Mountain Time    MRI Lumbar Spine Without Contrast [[id number]] Collected: 09/10/22 0055     Updated: 09/10/22 0110    Narrative:      EXAMINATION: MRI LUMBAR SPINE WO CONTRAST    DATE: 9/9/2022 11:41 PM     INDICATION: back pain, recent epidural injection, RLE weakness     COMPARISON: None available.    TECHNIQUE: Sagittal and axial noncontrast images of the lumbar spine were obtained in the usual manner.    FINDINGS:    For this report, there are 5 lumbar type vertebral bodies. Moderate scoliosis. 8 mm grade 2 anterolisthesis of L4 on L5.     Conus terminates at L2. Nerve roots of the cauda equina are normal.       No acute fractures. Vertebral body heights are maintained. No concerning marrow signal. Imaged portions of the sacrum and sacroiliac joints are within normal limits.    Imaged abdominal soft tissues are unremarkable. Moderate atrophy of the lower lumbar paraspinal muscles. More superiorly, mild atrophy of the right posterior paraspinal muscles at the level of the thoracolumbar junction.      Degenerative Changes:    T12-L1: Seen only on sagittal view. Disc height loss. Disc bulge. Facet arthropathy. Mild bilateral foraminal narrowing. No significant spinal canal narrowing.    L1-L2: Left eccentric disc bulge. Mild facet arthropathy. Mild bilateral foraminal narrowing. No significant spinal canal narrowing.    L2-L3: Loss of disc height. Large diffuse disc extrusion. Moderate facet arthropathy. Severe spinal canal narrowing. Thecal sac measures 3 mm in AP dimension. There is severe bilateral subarticular zone narrowing, worse on the left. Severe right and   moderate left foraminal narrowing.    L3-L4: Left eccentric disc bulge. Moderate facet arthropathy. Moderate bilateral foraminal narrowing. Mild left  subarticular zone narrowing. Moderate spinal canal narrowing. Thecal sac measures 7 mm in AP dimension.    L4-L5: Grade 2 anterolisthesis with uncovering the superior aspect of the disc. Severe facet arthropathy. Moderate right and severe left foraminal narrowing. Severe bilateral subarticular zone narrowing, worse on the left. Severe thecal sac stenosis   which measures 5 mm in AP dimension.    L5-S1: Mild facet arthropathy. Small disc bulge. No significant spinal canal or foraminal narrowing.            Impression:        1.  Severe degenerative changes of the lumbar spine. Moderate scoliosis.  2.  At L2-L3, there is severe spinal canal stenosis with crowding/compression of the cauda equina nerve roots due to a circumferential disc extrusion and facet arthropathy. There is also severe right foraminal narrowing.  3.  At L4-L5, there is severe spinal canal stenosis due to grade 2 anterolisthesis and facet arthropathy. There is also severe left foraminal narrowing.  4.  Additional milder degenerative changes detailed above.        Electronically signed by:  Jose Alfredo Finch DO    9/9/2022 11:09 PM Mountain Time    XR Chest 1 View [[id number]] Collected: 09/09/22 1933     Updated: 09/09/22 1937    Narrative:      Examination: XR CHEST 1 VW-     Date of Exam: 9/9/2022 6:44 PM     Indication: Acute Stroke Protocol (onset < 12 hrs).     Comparison: None available.     Technique: Single radiographic view of the chest was obtained.     Findings:  There is no pneumothorax, pleural effusion or focal airspace  consolidation. Cardiomediastinal silhouette is unremarkable. Pulmonary  vasculature appears within normal limits. Regional bones appear grossly  intact. There is a retrocardiac rounded density containing gas, likely  large hiatal hernia.       Impression:         1. No acute cardiopulmonary abnormality.  2. Suspected hiatal hernia.     This report was finalized on 9/9/2022 7:34 PM by Omar Hughes MD.       CT Angiogram  Head w AI Analysis of LVO [[id number]] Collected: 09/09/22 1850     Updated: 09/09/22 1900    Narrative:      Examination: CT ANGIOGRAM NECK-, CT ANGIOGRAM HEAD W AI ANALYSIS OF LVO-     Date of Exam: 9/9/2022 6:13 PM     Indication: Stroke, follow up.     Comparison: None available.     Technique: Axial volumetric contrast-enhanced CT angiographic images of  the head and neck were acquired utilizing 115 mL Isovue-370  IV  contrast. 3-D reconstructions were created for interpretation. Automated  exposure control and iterative reconstruction methods were used. AI  analysis of LVO was utilized for the CTA Head imaging portion of the  study.         Findings:  Aortic arch: There is minimal plaque in the aortic arch.  There is 4  vessel arch anatomy with the left vertebral artery arising directly from  the aortic arch. The right brachiocephalic and visualized bilateral  subclavian arteries are within normal limits.     Right carotid: The right CCA arises as expected from the brachiocephalic  trunk.  The CCA follows a normal course and appears normal caliber.   There is minimal CCA calcified plaque. The carotid bifurcation is  unremarkable.  The external carotid artery and distal branches appear  within normal limits.  The cervical internal carotid artery follows a  normal course and appears normal caliber throughout the neck and into  the head.  The intracranial ICA segments appear within normal limits.   The ophthalmic artery origin is normal.  The A1 and M1 segments appear  within normal limits.  The visualized distal EDDIE and MCA branches appear  patent.  There is  a patent  anterior communicating artery. There is a  patent  posterior communicating artery.     Left carotid: The left CCA arises as expected from the aortic arch.    The CCA follows a normal course and appears normal caliber.  There is  mild nonstenosing plaque at the carotid bifurcation and distal CCA. The  external carotid artery and distal branches  appear within normal limits.   The cervical internal carotid artery follows a normal course and  appears normal caliber throughout the neck and into the head.  The  intracranial ICA segments appear within normal limits.  The ophthalmic  artery origin is normal.  The A1 and M1 segments appear within normal  limits.  The visualized distal EDDIE and MCA branches appear patent.   There is a patent  posterior communicating artery.     Posterior circulation:Left vertebral artery arises from the aortic arch  and the right vertebral artery arises from the subclavian artery. There  is right vertebral artery dominance. The vertebral arteries follow a  normal course and appear normal caliber throughout the neck and into the  head.  The V4 segments are patent.  Visualized posterior inferior  cerebellar arteries are within normal limits.  The basilar artery is  normal caliber.  Superior cerebellar arteries are patent.  Bilateral P1  segments and posterior cerebral arteries appear within normal limits.        Nonvascular findings: Intracranial structures appear unremarkable.   There is evidence of prior lens replacement. There is severe obstructive  paranasal sinus mucosal disease throughout the right side. Mastoids are  well aerated. Superficial soft tissues and underlying musculature appear  within normal limits.  The lung apices are clear.  The thyroid and  salivary glands appear unremarkable.  The suprahyoid and infrahyoid  spaces of the neck appear unremarkable.  There is no evidence of  cervical lymphadenopathy or a neck mass.  There are no acute osseous  abnormalities or destructive bone lesions. There are moderate cervical  degenerative changes. No high-grade spinal canal stenosis. There is 3 mm  anterolisthesis of C4 on C5 and there is severe C5-C6 disc degeneration.       Impression:         1. Minimal atherosclerotic plaque without evidence of stenosis,  occlusion, aneurysm or dissection in the neck or the head.  2.  Cervical degenerative changes.  3. 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     This report was finalized on 9/9/2022 6:57 PM by Omar Hughes MD.       CT Angiogram Neck [[id number]] Collected: 09/09/22 1850     Updated: 09/09/22 1900    Narrative:      Examination: CT ANGIOGRAM NECK-, CT ANGIOGRAM HEAD W AI ANALYSIS OF LVO-     Date of Exam: 9/9/2022 6:13 PM     Indication: Stroke, follow up.     Comparison: None available.     Technique: Axial volumetric contrast-enhanced CT angiographic images of  the head and neck were acquired utilizing 115 mL Isovue-370  IV  contrast. 3-D reconstructions were created for interpretation. Automated  exposure control and iterative reconstruction methods were used. AI  analysis of LVO was utilized for the CTA Head imaging portion of the  study.         Findings:  Aortic arch: There is minimal plaque in the aortic arch.  There is 4  vessel arch anatomy with the left vertebral artery arising directly from  the aortic arch. The right brachiocephalic and visualized bilateral  subclavian arteries are within normal limits.     Right carotid: The right CCA arises as expected from the brachiocephalic  trunk.  The CCA follows a normal course and appears normal caliber.   There is minimal CCA calcified plaque. The carotid bifurcation is  unremarkable.  The external carotid artery and distal branches appear  within normal limits.  The cervical internal carotid artery follows a  normal course and appears normal caliber throughout the neck and into  the head.  The intracranial ICA segments appear within normal limits.   The ophthalmic artery origin is normal.  The A1 and M1 segments appear  within normal limits.  The visualized distal EDDIE and MCA branches appear  patent.  There is  a patent  anterior communicating artery. There is a  patent  posterior communicating artery.     Left carotid: The left CCA arises as expected from the aortic arch.    The CCA  follows a normal course and appears normal caliber.  There is  mild nonstenosing plaque at the carotid bifurcation and distal CCA. The  external carotid artery and distal branches appear within normal limits.   The cervical internal carotid artery follows a normal course and  appears normal caliber throughout the neck and into the head.  The  intracranial ICA segments appear within normal limits.  The ophthalmic  artery origin is normal.  The A1 and M1 segments appear within normal  limits.  The visualized distal EDDIE and MCA branches appear patent.   There is a patent  posterior communicating artery.     Posterior circulation:Left vertebral artery arises from the aortic arch  and the right vertebral artery arises from the subclavian artery. There  is right vertebral artery dominance. The vertebral arteries follow a  normal course and appear normal caliber throughout the neck and into the  head.  The V4 segments are patent.  Visualized posterior inferior  cerebellar arteries are within normal limits.  The basilar artery is  normal caliber.  Superior cerebellar arteries are patent.  Bilateral P1  segments and posterior cerebral arteries appear within normal limits.        Nonvascular findings: Intracranial structures appear unremarkable.   There is evidence of prior lens replacement. There is severe obstructive  paranasal sinus mucosal disease throughout the right side. Mastoids are  well aerated. Superficial soft tissues and underlying musculature appear  within normal limits.  The lung apices are clear.  The thyroid and  salivary glands appear unremarkable.  The suprahyoid and infrahyoid  spaces of the neck appear unremarkable.  There is no evidence of  cervical lymphadenopathy or a neck mass.  There are no acute osseous  abnormalities or destructive bone lesions. There are moderate cervical  degenerative changes. No high-grade spinal canal stenosis. There is 3 mm  anterolisthesis of C4 on C5 and there is severe  C5-C6 disc degeneration.       Impression:         1. Minimal atherosclerotic plaque without evidence of stenosis,  occlusion, aneurysm or dissection in the neck or the head.  2. Cervical degenerative changes.  3. 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     This report was finalized on 9/9/2022 6:57 PM by Omra Hughes MD.       CT CEREBRAL PERFUSION WITH & WITHOUT CONTRAST [[id number]] Collected: 09/09/22 1844     Updated: 09/09/22 1851    Narrative:      DATE OF EXAM: 9/9/2022 6:13 PM     PROCEDURE: CT CEREBRAL PERFUSION W WO CONTRAST-     INDICATIONS: Neuro deficit, acute, stroke suspected     COMPARISON: No comparisons available.     TECHNIQUE: Routine transaxial cuts were obtained through the head  without administration of contrast. Routine transaxial cuts were then  obtained through the head following the intravenous administration of  115 mL of Isovue 370. Core blood volume, core blood flow, mean transit  time, and Tmax images were obtained utilizing the Rapid software  protocol. A limited CT angiogram of the head was also performed to  measure the blood vessel density.      The radiation dose reduction device was turned on for each scan per the  ALARA (As Low as Reasonably Achievable) protocol.     FINDINGS:   There is no significant perfusion abnormality in the brain. Cerebral  blood flow, cerebral blood volume and mean transit time appear within  normal limits.        Impression:      No perfusion abnormalities in the brain. No evidence of brain risk or  cortical infarct.     This report was finalized on 9/9/2022 6:46 PM by Omar Hughes MD.       CT Head Without Contrast Stroke Protocol [[id number]] Collected: 09/09/22 1812     Updated: 09/09/22 1817    Narrative:      Examination: CT HEAD WO CONTRAST STROKE PROTOCOL-     Date of Exam: 9/9/2022 6:06 PM     Indication: Neuro deficit, acute, stroke suspected.     Comparison: None available.     Technique: Axial noncontrast  CT imaging of the head was performed.  Automated exposure control and iterative reconstruction methods were  used.     Findings:  Superficial soft tissues appear within normal limits. The calvarium is  intact.  There is complete filling of the right maxillary sinus and the  right sphenoid sinus with mucosal disease. Moderate right ethmoid sinus  mucosal disease. The frontal sinuses are aplastic. The mastoids appear  well aerated. There is thinning of the orbital lenses bilaterally  suggestive of prior lens replacement. There is no acute intracranial  hemorrhage.  No mass effect or midline shift.  No abnormal extra-axial  collections.  Gray-white differentiation is within normal limits.  There  is mild patchy periventricular white matter hypoattenuation. Ventricular  size and configuration is normal for age.          Impression:         1. No acute intracranial abnormality.  2. Mild chronic small vessel ischemic change.  3. Severe obstructive right-sided paranasal sinus mucosal disease.     This report was finalized on 9/9/2022 6:14 PM by Omar Hughes MD.             Assessment:  Altered mental status could be underlying sepsis.  Initial lactic acid level was 2.8 now it is 1.7.  She is also on antibiotics.  I believe that she is doing better.  It is likely that she could have underlying sepsis.  Her sodium is slightly low at 133 but not low enough to make her really confused.  Her potassium is observed to be low at 2.6.  Which is being corrected.    Plan:  Continue present medications.    Comment:  As above.         I discussed the patients findings and my recommendations with patient    Diego Jeronimo MD  09/10/22  12:44 EDT

## 2022-09-10 NOTE — PLAN OF CARE
Problem: Adult Inpatient Plan of Care  Goal: Plan of Care Review  9/10/2022 1621 by Zuri Silva MS CCC-SLP  Outcome: Ongoing, Progressing  Flowsheets (Taken 9/10/2022 1621)  Plan of Care Reviewed With:   patient   spouse   family   Goal Outcome Evaluation:  Plan of Care Reviewed With: patient            SLP evaluation completed. Will address cognitive-communicaiton deficits in tx. Please see note for further details and recommendations.

## 2022-09-10 NOTE — PLAN OF CARE
Problem: Adult Inpatient Plan of Care  Goal: Plan of Care Review  Recent Flowsheet Documentation  Taken 9/10/2022 1109 by Jim Jj OT  Plan of Care Reviewed With: patient  Outcome Evaluation:   VSS   Pt presents with fxl decline from PLOF, deficits in ADL performance, fxl mobility, occupational endurance. Pt limited by weakness, mild tremors, mild confusion. RN cleared pt for bed level eval/pt on bedrest. Pt set up/SBA for grooming, anticipate Mod A LBD and Min A with toileting. Pt will benefit from skilled OT services to address deficits, facilitate increased fxl I. Recommend home with assist and HH services.   Goal Outcome Evaluation:  Plan of Care Reviewed With: patient           Outcome Evaluation: VSS; Pt presents with fxl decline from PLOF, deficits in ADL performance, fxl mobility, occupational endurance. Pt limited by weakness, mild tremors, mild confusion. RN cleared pt for bed level eval/pt on bedrest. Pt set up/SBA for grooming, anticipate Mod A LBD and Min A with toileting. Pt will benefit from skilled OT services to address deficits, facilitate increased fxl I. Recommend home with assist and HH services.

## 2022-09-11 LAB
25(OH)D3 SERPL-MCNC: 52 NG/ML (ref 30–100)
ANION GAP SERPL CALCULATED.3IONS-SCNC: 12 MMOL/L (ref 5–15)
BASOPHILS # BLD MANUAL: 0 10*3/MM3 (ref 0–0.2)
BASOPHILS NFR BLD MANUAL: 0 % (ref 0–1.5)
BUN SERPL-MCNC: 10 MG/DL (ref 8–23)
BUN/CREAT SERPL: 17.9 (ref 7–25)
CALCIUM SPEC-SCNC: 8.8 MG/DL (ref 8.6–10.5)
CHLORIDE SERPL-SCNC: 99 MMOL/L (ref 98–107)
CO2 SERPL-SCNC: 23 MMOL/L (ref 22–29)
CREAT SERPL-MCNC: 0.56 MG/DL (ref 0.57–1)
CRP SERPL-MCNC: <0.3 MG/DL (ref 0–0.5)
DEPRECATED RDW RBC AUTO: 44.4 FL (ref 37–54)
EGFRCR SERPLBLD CKD-EPI 2021: 95.3 ML/MIN/1.73
EOSINOPHIL # BLD MANUAL: 0 10*3/MM3 (ref 0–0.4)
EOSINOPHIL NFR BLD MANUAL: 0 % (ref 0.3–6.2)
ERYTHROCYTE [DISTWIDTH] IN BLOOD BY AUTOMATED COUNT: 14.4 % (ref 12.3–15.4)
ERYTHROCYTE [SEDIMENTATION RATE] IN BLOOD: 10 MM/HR (ref 0–30)
GLUCOSE SERPL-MCNC: 93 MG/DL (ref 65–99)
HCT VFR BLD AUTO: 34.2 % (ref 34–46.6)
HGB BLD-MCNC: 11.4 G/DL (ref 12–15.9)
LYMPHOCYTES # BLD MANUAL: 2.16 10*3/MM3 (ref 0.7–3.1)
LYMPHOCYTES NFR BLD MANUAL: 23 % (ref 5–12)
MCH RBC QN AUTO: 28.6 PG (ref 26.6–33)
MCHC RBC AUTO-ENTMCNC: 33.3 G/DL (ref 31.5–35.7)
MCV RBC AUTO: 85.7 FL (ref 79–97)
MONOCYTES # BLD: 1.42 10*3/MM3 (ref 0.1–0.9)
NEUTROPHILS # BLD AUTO: 2.59 10*3/MM3 (ref 1.7–7)
NEUTROPHILS NFR BLD MANUAL: 42 % (ref 42.7–76)
PLAT MORPH BLD: NORMAL
PLATELET # BLD AUTO: 165 10*3/MM3 (ref 140–450)
PMV BLD AUTO: 11.7 FL (ref 6–12)
POTASSIUM SERPL-SCNC: 3.7 MMOL/L (ref 3.5–5.2)
POTASSIUM SERPL-SCNC: 3.7 MMOL/L (ref 3.5–5.2)
POTASSIUM SERPL-SCNC: 4.2 MMOL/L (ref 3.5–5.2)
RBC # BLD AUTO: 3.99 10*6/MM3 (ref 3.77–5.28)
RBC MORPH BLD: NORMAL
RPR SER QL: NORMAL
SODIUM SERPL-SCNC: 132 MMOL/L (ref 136–145)
SODIUM SERPL-SCNC: 132 MMOL/L (ref 136–145)
SODIUM SERPL-SCNC: 134 MMOL/L (ref 136–145)
SODIUM SERPL-SCNC: 135 MMOL/L (ref 136–145)
T3FREE SERPL-MCNC: 2.61 PG/ML (ref 2–4.4)
T4 FREE SERPL-MCNC: 1.16 NG/DL (ref 0.93–1.7)
VARIANT LYMPHS NFR BLD MANUAL: 30 % (ref 19.6–45.3)
VARIANT LYMPHS NFR BLD MANUAL: 5 % (ref 0–5)
WBC MORPH BLD: NORMAL
WBC NRBC COR # BLD: 6.17 10*3/MM3 (ref 3.4–10.8)

## 2022-09-11 PROCEDURE — 85007 BL SMEAR W/DIFF WBC COUNT: CPT | Performed by: STUDENT IN AN ORGANIZED HEALTH CARE EDUCATION/TRAINING PROGRAM

## 2022-09-11 PROCEDURE — 84295 ASSAY OF SERUM SODIUM: CPT | Performed by: NURSE PRACTITIONER

## 2022-09-11 PROCEDURE — 85652 RBC SED RATE AUTOMATED: CPT | Performed by: INTERNAL MEDICINE

## 2022-09-11 PROCEDURE — 84132 ASSAY OF SERUM POTASSIUM: CPT | Performed by: STUDENT IN AN ORGANIZED HEALTH CARE EDUCATION/TRAINING PROGRAM

## 2022-09-11 PROCEDURE — 84481 FREE ASSAY (FT-3): CPT | Performed by: PSYCHIATRY & NEUROLOGY

## 2022-09-11 PROCEDURE — 80048 BASIC METABOLIC PNL TOTAL CA: CPT | Performed by: INTERNAL MEDICINE

## 2022-09-11 PROCEDURE — 87449 NOS EACH ORGANISM AG IA: CPT | Performed by: INTERNAL MEDICINE

## 2022-09-11 PROCEDURE — 99232 SBSQ HOSP IP/OBS MODERATE 35: CPT | Performed by: STUDENT IN AN ORGANIZED HEALTH CARE EDUCATION/TRAINING PROGRAM

## 2022-09-11 PROCEDURE — 99232 SBSQ HOSP IP/OBS MODERATE 35: CPT | Performed by: PSYCHIATRY & NEUROLOGY

## 2022-09-11 PROCEDURE — 84439 ASSAY OF FREE THYROXINE: CPT | Performed by: PSYCHIATRY & NEUROLOGY

## 2022-09-11 PROCEDURE — 82306 VITAMIN D 25 HYDROXY: CPT | Performed by: PSYCHIATRY & NEUROLOGY

## 2022-09-11 PROCEDURE — 25010000002 CEFTRIAXONE PER 250 MG: Performed by: INTERNAL MEDICINE

## 2022-09-11 PROCEDURE — 63710000001 PREDNISONE PER 5 MG: Performed by: NURSE PRACTITIONER

## 2022-09-11 PROCEDURE — 87385 HISTOPLASMA CAPSUL AG IA: CPT | Performed by: INTERNAL MEDICINE

## 2022-09-11 PROCEDURE — 85025 COMPLETE CBC W/AUTO DIFF WBC: CPT | Performed by: STUDENT IN AN ORGANIZED HEALTH CARE EDUCATION/TRAINING PROGRAM

## 2022-09-11 PROCEDURE — 99222 1ST HOSP IP/OBS MODERATE 55: CPT | Performed by: PHYSICIAN ASSISTANT

## 2022-09-11 PROCEDURE — 25010000002 ONDANSETRON PER 1 MG: Performed by: STUDENT IN AN ORGANIZED HEALTH CARE EDUCATION/TRAINING PROGRAM

## 2022-09-11 PROCEDURE — 97530 THERAPEUTIC ACTIVITIES: CPT

## 2022-09-11 PROCEDURE — 97162 PT EVAL MOD COMPLEX 30 MIN: CPT

## 2022-09-11 PROCEDURE — 86140 C-REACTIVE PROTEIN: CPT | Performed by: INTERNAL MEDICINE

## 2022-09-11 RX ORDER — METOPROLOL TARTRATE 50 MG/1
50 TABLET, FILM COATED ORAL EVERY 12 HOURS SCHEDULED
Status: DISCONTINUED | OUTPATIENT
Start: 2022-09-11 | End: 2022-09-16 | Stop reason: HOSPADM

## 2022-09-11 RX ORDER — ONDANSETRON 2 MG/ML
4 INJECTION INTRAMUSCULAR; INTRAVENOUS EVERY 6 HOURS PRN
Status: DISCONTINUED | OUTPATIENT
Start: 2022-09-11 | End: 2022-09-16 | Stop reason: HOSPADM

## 2022-09-11 RX ORDER — AMLODIPINE BESYLATE 5 MG/1
5 TABLET ORAL
Status: DISCONTINUED | OUTPATIENT
Start: 2022-09-11 | End: 2022-09-16 | Stop reason: HOSPADM

## 2022-09-11 RX ADMIN — PREDNISONE 5 MG: 5 TABLET ORAL at 10:39

## 2022-09-11 RX ADMIN — SODIUM CHLORIDE 2 G: 900 INJECTION INTRAVENOUS at 17:11

## 2022-09-11 RX ADMIN — SODIUM CHLORIDE 2 G: 900 INJECTION INTRAVENOUS at 05:45

## 2022-09-11 RX ADMIN — METOPROLOL TARTRATE 50 MG: 50 TABLET, FILM COATED ORAL at 20:43

## 2022-09-11 RX ADMIN — ACETAMINOPHEN 650 MG: 325 TABLET, FILM COATED ORAL at 16:12

## 2022-09-11 RX ADMIN — ACETAMINOPHEN 650 MG: 325 TABLET, FILM COATED ORAL at 06:11

## 2022-09-11 RX ADMIN — Medication 10 ML: at 20:43

## 2022-09-11 RX ADMIN — ATORVASTATIN CALCIUM 80 MG: 40 TABLET, FILM COATED ORAL at 20:43

## 2022-09-11 RX ADMIN — AMLODIPINE BESYLATE 5 MG: 5 TABLET ORAL at 12:58

## 2022-09-11 RX ADMIN — MULTIPLE VITAMINS W/ MINERALS TAB 1 TABLET: TAB at 10:38

## 2022-09-11 RX ADMIN — ASPIRIN 325 MG ORAL TABLET 325 MG: 325 PILL ORAL at 10:36

## 2022-09-11 RX ADMIN — Medication 10 ML: at 10:40

## 2022-09-11 RX ADMIN — Medication 2000 UNITS: at 10:38

## 2022-09-11 RX ADMIN — ONDANSETRON 4 MG: 2 INJECTION INTRAMUSCULAR; INTRAVENOUS at 17:05

## 2022-09-11 RX ADMIN — METOPROLOL TARTRATE 50 MG: 50 TABLET, FILM COATED ORAL at 12:58

## 2022-09-11 NOTE — THERAPY EVALUATION
Patient Name: Vane Villavicencio  : 1945    MRN: [id number]                              Today's Date: 2022       Admit Date: 2022    Visit Dx:     ICD-10-CM ICD-9-CM   1. Altered mental status, unspecified altered mental status type  R41.82 780.97   2. Hypokalemia  E87.6 276.8   3. Cognitive communication deficit  R41.841 799.52     Patient Active Problem List   Diagnosis   • Supraventricular tachycardia (HCC)   • Rheumatoid arthritis (HCC)   • Hypercholesterolemia   • Benign hypertension   • Abnormal stress test   • AMS (altered mental status)   • Hypokalemia     Past Medical History:   Diagnosis Date   • Asthma    • Benign hypertension    • Disease of thyroid gland    • Family history of heart disease    • Family history of heart disease    • Hypercholesterolemia    • Rheumatoid arthritis (HCC)    • Rheumatoid arthritis (HCC)    • Supraventricular tachycardia (HCC)     first diagnosed 2012   • Supraventricular tachycardia (HCC)      Past Surgical History:   Procedure Laterality Date   • CARDIAC CATHETERIZATION N/A 7/10/2020    Procedure: Left Heart Cath;  Surgeon: Pankaj Pollard MD;  Location: [facility] CATH INVASIVE LOCATION;  Service: Cardiology;  Laterality: N/A;   • SINUS SURGERY        General Information     Row Name 22 1535          Physical Therapy Time and Intention    Document Type evaluation  -CD     Mode of Treatment physical therapy  -CD     Row Name 22 1535          General Information    Patient Profile Reviewed yes  -CD     Prior Level of Function independent:;all household mobility;community mobility;gait;transfer;bed mobility;ADL's  PER SPOUSE, USES QUAD CANE FOR MOBILITY. HAS A ROLLATOR AND BSC BUT WAS NOT USING PTA.  -CD     Existing Precautions/Restrictions fall;other (see comments)  HAS YEE CATHETER, RA, GRADE II SPONDYLOLISTHESIS L4-L5, SEVERE SPINAL STENOSIS L2-L3.  -CD     Barriers to Rehab cognitive status;medically complex  -CD     Row Name 22  1535          Home Main Entrance    Number of Stairs, Main Entrance one  -CD     Row Name 09/11/22 1535          Stairs Within Home, Primary    Number of Stairs, Within Home, Primary none  -CD     Row Name 09/11/22 1535          Cognition    Orientation Status (Cognition) oriented x 3  INITIALLY DIFFICULT TO AROUSE BUT CONVERSING AT END OF SESSION. C/O DRY EYES AND CHRONIC LBP/HIP PAIN.  -CD     Row Name 09/11/22 1535          Safety Issues, Functional Mobility    Safety Issues Affecting Function (Mobility) safety precaution awareness;safety precautions follow-through/compliance;sequencing abilities;insight into deficits/self-awareness;awareness of need for assistance  -CD     Impairments Affecting Function (Mobility) balance;cognition;endurance/activity tolerance;coordination;strength;postural/trunk control  -CD     Cognitive Impairments, Mobility Safety/Performance awareness, need for assistance;insight into deficits/self-awareness;safety precaution awareness;safety precaution follow-through;sequencing abilities  -CD     Comment, Safety Issues/Impairments (Mobility) PT FATIGUES QUICKLY AND IS TREMULOUS WITH MOBILITY,  -CD           User Key  (r) = Recorded By, (t) = Taken By, (c) = Cosigned By    Initials Name Provider Type    CD Marj Dawkins, PT Physical Therapist               Mobility     Row Name 09/11/22 1539          Bed Mobility    Bed Mobility supine-sit;scooting/bridging  -CD     Scooting/Bridging [place] (Bed Mobility) moderate assist (50% patient effort)  -CD     Supine-Sit [place] (Bed Mobility) moderate assist (50% patient effort)  -CD     Assistive Device (Bed Mobility) bed rails;draw sheet;head of bed elevated  -CD     Comment, (Bed Mobility) INCREASED TIME AND EFFORT TO MOVE TO EOB. C/O LBP/B HIP PAIN, PAIN FROM CATHETER.  -CD     Row Name 09/11/22 1539          Transfers    Comment, (Transfers) STS FROM EOB AND RECLINER, STAND STEP PIVOT BED TO RECLINER VIA B UE SUPPORT.  -CD     Row  Name 09/11/22 1539          Bed-Chair Transfer    Bed-Chair [place] (Transfers) moderate assist (50% patient effort);verbal cues  -CD     Assistive Device (Bed-Chair Transfers) --  VIA B UE SUPPORT.  -CD     Row Name 09/11/22 1539          Sit-Stand Transfer    Sit-Stand [place] (Transfers) verbal cues;moderate assist (50% patient effort)  -CD     Assistive Device (Sit-Stand Transfers) --  VIA B UE SUPPORT.  -CD     Comment, (Sit-Stand Transfer) PT TREMULOUS UPON STANDING. C/O LBP/B HIP PAIN.  -CD     Row Name 09/11/22 1539          Gait/Stairs (Locomotion)    Comment, (Gait/Stairs) DEFERRED DUE TO FATIGUE, WEAKNESS.  -CD           User Key  (r) = Recorded By, (t) = Taken By, (c) = Cosigned By    Initials Name Provider Type    CD Marj Dawkins, PT Physical Therapist               Obj/Interventions     Row Name 09/11/22 1543          Range of Motion Comprehensive    General Range of Motion bilateral lower extremity ROM WFL  -CD     Row Name 09/11/22 1543          Strength Comprehensive (MMT)    General Manual Muscle Testing (MMT) Assessment lower extremity strength deficits identified  -CD     Comment, General Manual Muscle Testing (MMT) Assessment GROSSLY 4 TO 4-/5 B LE'S.  -CD     Row Name 09/11/22 1543          Motor Skills    Motor Skills coordination;functional endurance  -CD     Coordination gross motor deficit;bilateral;lower extremity;minimal impairment  -CD     Functional Endurance O2 SATS STABLE ON RA.  -CD     Therapeutic Exercise --  COMPLETED SEATED B LE THER EX: LAQ, HIP FLEXION AND AP'S 1 SET OF 10 REPS. SUPINE THER EX: SAQ, HEEL SLIDES-  1 SET OF 5 REPS EACH.  -CD     Row Name 09/11/22 1543          Balance    Balance Assessment sitting static balance;sit to stand dynamic balance;standing static balance;standing dynamic balance;sitting dynamic balance  -CD     Static Sitting Balance standby assist  -CD     Dynamic Sitting Balance contact guard  -CD     Position, Sitting Balance  supported;sitting edge of bed  -CD     Static Standing Balance minimal assist;verbal cues  -CD     Dynamic Standing Balance moderate assist;verbal cues  -CD     Position/Device Used, Standing Balance supported  B UE SUPPORT.  -CD     Comment, Balance WORKED ON STANDING WITH WT SHIFTING R/L WITH UPRIGHT POSTURE. TOLERATED STANDING X 30 SECONDS ON 1ST REP AND 1 MINUTE ON 2ND REP.  -CD           User Key  (r) = Recorded By, (t) = Taken By, (c) = Cosigned By    Initials Name Provider Type    CD Marj Dawkins, PT Physical Therapist               Goals/Plan     Row Name 09/11/22 1551          Bed Mobility Goal 1 (PT)    Activity/Assistive Device (Bed Mobility Goal 1, PT) sit to supine/supine to sit  -CD     [place] Level/Cues Needed (Bed Mobility Goal 1, PT) contact guard required  -CD     Time Frame (Bed Mobility Goal 1, PT) long term goal (LTG);2 weeks  -CD     Row Name 09/11/22 1551          Transfer Goal 1 (PT)    Activity/Assistive Device (Transfer Goal 1, PT) sit-to-stand/stand-to-sit;bed-to-chair/chair-to-bed  -CD     [place] Level/Cues Needed (Transfer Goal 1, PT) contact guard required  -CD     Time Frame (Transfer Goal 1, PT) long term goal (LTG);2 weeks  -CD     Row Name 09/11/22 1551          Gait Training Goal 1 (PT)    Activity/Assistive Device (Gait Training Goal 1, PT) gait (walking locomotion);walker, rolling  -CD     [place] Level (Gait Training Goal 1, PT) minimum assist (75% or more patient effort)  -CD     Distance (Gait Training Goal 1, PT) 200 FEET  -CD     Time Frame (Gait Training Goal 1, PT) long term goal (LTG);2 weeks  -CD     Row Name 09/11/22 1551          Therapy Assessment/Plan (PT)    Planned Therapy Interventions (PT) balance training;bed mobility training;gait training;transfer training;strengthening;patient/family education;home exercise program;postural re-education  -CD           User Key  (r) = Recorded By, (t) = Taken By, (c) = Cosigned By    Initials Name Provider  Type    CD Marj Dawkins, PT Physical Therapist               Clinical Impression     Row Name 09/11/22 1547          Pain    Pain Intervention(s) Rest;Repositioned  -CD     Additional Documentation Pain Scale: FACES Pre/Post-Treatment (Group)  -CD     Row Name 09/11/22 1547          Pain Scale: FACES Pre/Post-Treatment    Pain: FACES Scale, Pretreatment 2-->hurts little bit  INCREASED BACK AND B HIP PAIN TO 4/10 WITH BED MOBILITY AND TRANSFERS,  -CD     Posttreatment Pain Rating 2-->hurts little bit  -CD     Pain Location - Side/Orientation Bilateral  -CD     Pain Location lower  -CD     Pain Location - back;hip  -CD     Row Name 09/11/22 1547          Plan of Care Review    Plan of Care Reviewed With patient;spouse;sibling  -CD     Outcome Evaluation PT PRESENTS WITH EVOLVING SYMPTOMS TO INCLUDE IMPAIRED BALANCE, GENERALIZED WEAKNESS, DECREASED FUNCTIONAL ENDURANCE, MILD TREMORS/LETHARGY, MILD CONFUSION AND DECLINE IN FUNCTIONAL MOBILITY. PT REQUIRED MOD ASSIST FOR SUPINE TO SIT, STS AND STAND STEP PIVOT TRANSFER BED TO CHAIR. PT FOLLOW ALL COMMANDS AND IS MOTIVATED TO WORK WITH THERAPY BUT IS LIMITED BY WEAKNESS AND FATIGUE. RECOMMEND IRF AT D/C.  -CD     Row Name 09/11/22 1547          Therapy Assessment/Plan (PT)    Patient/Family Therapy Goals Statement (PT) TO RETURN TO PLOF.  -CD     Rehab Potential (PT) good, to achieve stated therapy goals  -CD     Criteria for Skilled Interventions Met (PT) yes;meets criteria;skilled treatment is necessary  -CD     Therapy Frequency (PT) daily  -CD     Row Name 09/11/22 1547          Vital Signs    Pre Systolic BP Rehab 144  -CD     Pre Treatment Diastolic BP 80  -CD     Intra Systolic BP Rehab 150  -CD     Intra Treatment Diastolic BP 76  -CD     Post Systolic BP Rehab 76  -CD     Pretreatment Heart Rate (beats/min) 84  -CD     Pre SpO2 (%) 96  -CD     O2 Delivery Pre Treatment room air  -CD     O2 Delivery Intra Treatment room air  -CD     Post SpO2 (%) 96  -CD     O2  Delivery Post Treatment room air  -CD     Pre Patient Position Supine  -CD     Intra Patient Position Standing  -CD     Post Patient Position Sitting  -CD     Row Name 09/11/22 1547          Positioning and Restraints    Post Treatment Position chair  -CD     In Chair reclined;call light within reach;encouraged to call for assist;exit alarm on;with family/caregiver;RUE elevated;LUE elevated;legs elevated;R heel elevated;waffle cushion;notified nsg  -CD           User Key  (r) = Recorded By, (t) = Taken By, (c) = Cosigned By    Initials Name Provider Type    CD Marj Dawkins, PT Physical Therapist               Outcome Measures     Row Name 09/11/22 1552 09/11/22 0854       How much help from another person do you currently need...    Turning from your back to your side while in flat bed without using bedrails? 3  -CD 3  -LM    Moving from lying on back to sitting on the side of a flat bed without bedrails? 2  -CD 2  -LM    Moving to and from a bed to a chair (including a wheelchair)? 2  -CD 2  -LM    Standing up from a chair using your arms (e.g., wheelchair, bedside chair)? 2  -CD 2  -LM    Climbing 3-5 steps with a railing? 1  -CD 2  -LM    To walk in hospital room? 2  -CD 2  -LM    AM-PAC 6 Clicks Score (PT) 12  -CD 13  -LM    Highest level of mobility 4 --> Transferred to chair/commode  -CD 4 --> Transferred to chair/commode  -LM    Row Name 09/11/22 1552          Modified [place] Scale    Modified [place] Scale 4 - Moderately severe disability.  Unable to walk without assistance, and unable to attend to own bodily needs without assistance.  -CD     Row Name 09/11/22 1552          Functional Assessment    Outcome Measure Options AM-PAC 6 Clicks Basic Mobility (PT);Modified Molly  -CD           User Key  (r) = Recorded By, (t) = Taken By, (c) = Cosigned By    Initials Name Provider Type    Marj Grey, PT Physical Therapist    Mitzi Goodwin, RN Registered Nurse                             Physical  Therapy Education                 Title: PT OT SLP Therapies (Done)     Topic: Physical Therapy (Done)     Point: Mobility training (Done)     Learning Progress Summary           Patient Acceptance, E, VU,NR by CD at 9/11/2022 1553    Comment: SEE FLOWSHEET                   Point: Home exercise program (Done)     Learning Progress Summary           Patient Acceptance, E, VU,NR by CD at 9/11/2022 1553    Comment: SEE FLOWSHEET                   Point: Body mechanics (Done)     Learning Progress Summary           Patient Acceptance, E, VU,NR by CD at 9/11/2022 1553    Comment: SEE FLOWSHEET                   Point: Precautions (Done)     Learning Progress Summary           Patient Acceptance, E, VU,NR by CD at 9/11/2022 1553    Comment: SEE FLOWSHEET                               User Key     Initials Effective Dates Name Provider Type Discipline    CD 06/16/21 -  Marj Dawkins PT Physical Therapist PT              PT Recommendation and Plan  Planned Therapy Interventions (PT): balance training, bed mobility training, gait training, transfer training, strengthening, patient/family education, home exercise program, postural re-education  Plan of Care Reviewed With: patient, spouse, sibling  Outcome Evaluation: PT PRESENTS WITH EVOLVING SYMPTOMS TO INCLUDE IMPAIRED BALANCE, GENERALIZED WEAKNESS, DECREASED FUNCTIONAL ENDURANCE, MILD TREMORS/LETHARGY, MILD CONFUSION AND DECLINE IN FUNCTIONAL MOBILITY. PT REQUIRED MOD ASSIST FOR SUPINE TO SIT, STS AND STAND STEP PIVOT TRANSFER BED TO CHAIR. PT FOLLOW ALL COMMANDS AND IS MOTIVATED TO WORK WITH THERAPY BUT IS LIMITED BY WEAKNESS AND FATIGUE. RECOMMEND IRF AT D/C.     Time Calculation:    PT Charges     Row Name 09/11/22 1553             Time Calculation    Start Time 1438  -CD      PT Received On 09/11/22  -CD      PT Goal Re-Cert Due Date 09/21/22 -CD              Timed Charges    75342 - PT Therapeutic Exercise Minutes 5  -CD      38778 - PT Therapeutic Activity  Minutes 15  -CD              Untimed Charges    PT Eval/Re-eval Minutes 60  -CD              Total Minutes    Timed Charges Total Minutes 20  -CD      Untimed Charges Total Minutes 60  -CD       Total Minutes 80  -CD            User Key  (r) = Recorded By, (t) = Taken By, (c) = Cosigned By    Initials Name Provider Type    CD Marj Dawkins, PT Physical Therapist              Therapy Charges for Today     Code Description Service Date Service Provider Modifiers Qty    86131265645 HC PT THERAPEUTIC ACT EA 15 MIN 9/11/2022 Marj Dawkins, PT GP 1    08526470432 HC PT EVAL MOD COMPLEXITY 4 9/11/2022 Marj Dawkins, PT GP 1          PT G-Codes  Outcome Measure Options: AM-PAC 6 Clicks Basic Mobility (PT), Modified [place]  AM-PAC 6 Clicks Score (PT): 12  AM-PAC 6 Clicks Score (OT): 19  Modified Molly Scale: 4 - Moderately severe disability.  Unable to walk without assistance, and unable to attend to own bodily needs without assistance.    Marj Dawkins, PT  9/11/2022

## 2022-09-11 NOTE — CONSULTS
Consults    Referring Provider: Selina MITCHELL    Patient Care Team:  Rolando Jeronimo MD as PCP - General (Internal Medicine)    Chief Complaint: Altered mental status    Subjective .     History of present illness:       Patient is nice 77-year-old who has a history of chronic low back issues and chronic pain for which she receives injections with Dr. Hernandez.    Patient started a new medicine which is an injectable for rheumatoid arthritis.  She took her most recent dose on Thursday.  After this she developed some somnolence and difficulty with ambulation was brought to the hospital by her .    Today patient is somewhat conversant but slow to speak.  Most of the history was derived from the  and friend.    Asked the patient about her pain and she really did not respond that she was having much back pain at this time but whenever she does move she has right lower back pain with some flank involvement.     Review of Systems  Difficult to obtain secondary to somnolence  History  Past Medical History:   Diagnosis Date   • Asthma    • Benign hypertension    • Disease of thyroid gland    • Family history of heart disease    • Family history of heart disease    • Hypercholesterolemia    • Rheumatoid arthritis (HCC)    • Rheumatoid arthritis (HCC)    • Supraventricular tachycardia (HCC)     first diagnosed 06/01/2012   • Supraventricular tachycardia (HCC)    ,   Past Surgical History:   Procedure Laterality Date   • CARDIAC CATHETERIZATION N/A 7/10/2020    Procedure: Left Heart Cath;  Surgeon: Pankaj Pollard MD;  Location: [facility] CATH INVASIVE LOCATION;  Service: Cardiology;  Laterality: N/A;   • SINUS SURGERY     ,   Family History   Problem Relation Age of Onset   • Heart disease Other    • Hypertension Sister    ,   Social History     Socioeconomic History   • Marital status:    Tobacco Use   • Smoking status: Never Smoker   • Smokeless tobacco: Never Used   Vaping Use   • Vaping Use: Never used    Substance and Sexual Activity   • Alcohol use: No   • Drug use: No   • Sexual activity: Defer     E-cigarette/Vaping   • E-cigarette/Vaping Use Never User      E-cigarette/Vaping Substances     E-cigarette/Vaping Devices       ,   Medications Prior to Admission   Medication Sig Dispense Refill Last Dose   • amLODIPine (NORVASC) 5 MG tablet Take 5 mg by mouth 2 (two) times a day.   9/10/2022 at Unknown time   • aspirin 81 MG EC tablet Take 81 mg by mouth Daily.      • cholecalciferol (VITAMIN D3) 25 MCG (1000 UT) tablet Take 2,000 Units by mouth Daily.      • clopidogrel (PLAVIX) 75 MG tablet Take 1 tablet by mouth Daily. 30 tablet 11    • fluticasone (FLONASE) 50 MCG/ACT nasal spray 2 sprays into each nostril as needed. Administer 2 sprays in each nostril for each dose.       • hydroCHLOROthiazide (HYDRODIURIL) 25 MG tablet Take 25 mg by mouth Daily.      • leflunomide (ARAVA) 20 MG tablet Take 20 mg by mouth daily.      • levothyroxine (SYNTHROID, LEVOTHROID) 75 MCG tablet Take 75 mcg by mouth daily.      • LORazepam (ATIVAN) 0.5 MG tablet Take 0.5 mg by mouth every 8 (eight) hours as needed for anxiety.      • metoprolol tartrate (LOPRESSOR) 50 MG tablet Take 50 mg by mouth 2 (Two) Times a Day.      • Multiple Vitamins-Minerals (WOMENS BONE HEALTH PO) Take 1 tablet by mouth daily.      • potassium chloride (MICRO-K) 10 MEQ CR capsule Take 20 mEq by mouth Daily.      • potassium chloride 10 MEQ CR tablet Take 1 tablet by mouth 2 (Two) Times a Day. 14 tablet 0    • pravastatin (PRAVACHOL) 80 MG tablet Take 80 mg by mouth daily.      • predniSONE (DELTASONE) 2.5 MG tablet Take 5 mg by mouth daily.      • traMADol (ULTRAM) 50 MG tablet Take 50 mg by mouth Every 6 (Six) Hours As Needed for Moderate Pain .      , Scheduled Meds:  hold, 1 each, Does not apply, BID  Pharmacy Consult, , Does not apply, Once  amLODIPine, 5 mg, Oral, Q24H  aspirin, 325 mg, Oral, Daily   Or  aspirin, 300 mg, Rectal, Daily  atorvastatin, 80  mg, Oral, Nightly  cefTRIAXone, 2 g, Intravenous, Q12H  cholecalciferol, 2,000 Units, Oral, Daily  metoprolol tartrate, 50 mg, Oral, Q12H  multivitamin with minerals, 1 tablet, Oral, Daily  predniSONE, 5 mg, Oral, Daily  sodium chloride, 10 mL, Intravenous, Q12H    , Continuous Infusions:   , PRN Meds:  acetaminophen  •  fluticasone  •  LORazepam  •  magnesium sulfate **OR** magnesium sulfate **OR** magnesium sulfate  •  potassium chloride **OR** potassium chloride **OR** potassium chloride  •  potassium phosphate infusion greater than 15 mMoles **OR** potassium phosphate infusion greater than 15 mMoles **OR** potassium phosphate **OR** sodium phosphate IVPB **OR** sodium phosphate IVPB **OR** sodium phosphate IVPB  •  sodium chloride  •  sodium chloride and Allergies:  Patient has no known allergies.   SMOKING STATUS: Non-smoker  Objective     Vital Signs   Temp:  [97.8 °F (36.6 °C)-98.7 °F (37.1 °C)] 97.9 °F (36.6 °C)  Heart Rate:  [] 104  Resp:  [18] 18  BP: (141-166)/(71-93) 166/93  Body mass index is 24.01 kg/m².    Physical Exam:     Body mass index is 24.01 kg/m².    GENERAL:           The patient is in no acute distress, but is slow to answer questions  Neck:          Supple without lymphadenopathy    Cardiovascular:       Peripheral pulses 2+ at dorsalis pedis and posterior tibialis    Lungs:         Breathing unlabored    Musculoskeletal:            strength is 5 out of 5 bilaterally.        Shoulder abduction is 5 out of 5.         Dorsiflexion is 5/5 Bilaterally  Proximal hip flexion 5 out of 5 bilaterally in antigravity bilaterally right lower extremity causes pain in the low back       Plantarflexion is 5/5 bilaterally       Hip Flexion 5/5 bilaterally.    Gait not tested.    Neurologic:          The patient is alert and oriented by 3.          Pupils are equal and reactive to light.         Visual fields are full.         Extraocular movements are intact without nystagmus.         There is  no evidence of central motor drift. No facial droop.  No difficulty with rapid alternating movements.         Sensation is equal bilaterally with no deficit.           Reflexes:  2+ through out  No Velazquez's no clonus no long track signs    CRANIAL NERVES:         Deferred  Results Review:   I reviewed the patient's new imaging results and agree with the interpretation.  MRI lumbar spine reviewed showing almost grade 2 spondylolisthesis at L4-5.  Patient also has severe spinal canal stenosis at L2-3 with arachnoid clumping above.    Assessment & Plan       Rheumatoid arthritis (HCC)    Hypercholesterolemia    Benign hypertension    AMS (altered mental status)    Hypokalemia    From a neurosurgical standpoint patient needs to be optimized medically and get through this hospitalization with her altered mental status and then see us in outpatient setting for consideration for surgery.  This was discussed with family and patient and they understand that the patient will need to be off all of her disease modifying medicines for at least 90 days prior to even considering an extensive surgery.    Hopefully we will avoid this but with his badgers her scan looks she may require surgery.    I discussed the patients findings and my recommendations with patient, family and consulting provider    David Henning PA-C  09/11/22  13:25 EDT    Time: 60 minutes

## 2022-09-11 NOTE — PLAN OF CARE
I reviewed scans specifically discussed the case with my PA who had seen and examined the patient personally no exam consistent with cauda equina syndrome obviously has multiple areas of spinal stenosis as well as probably subacute disc herniation L2-3    Surgery would be quite extensive for this    Plan will be rehab control for immunosuppressive and infectious issues clearance of mental status changes followed by follow-up in clinic to discuss longer-term surgical care plans    Patient is likely to require rehab

## 2022-09-11 NOTE — PLAN OF CARE
Goal Outcome Evaluation:  Plan of Care Reviewed With: patient, spouse, sibling           Outcome Evaluation: PT PRESENTS WITH EVOLVING SYMPTOMS TO INCLUDE IMPAIRED BALANCE, GENERALIZED WEAKNESS, DECREASED FUNCTIONAL ENDURANCE, MILD TREMORS/LETHARGY, MILD CONFUSION AND DECLINE IN FUNCTIONAL MOBILITY. PT REQUIRED MOD ASSIST FOR SUPINE TO SIT, STS AND STAND STEP PIVOT TRANSFER BED TO CHAIR. PT FOLLOW ALL COMMANDS AND IS MOTIVATED TO WORK WITH THERAPY BUT IS LIMITED BY WEAKNESS AND FATIGUE. RECOMMEND IRF AT D/C.

## 2022-09-11 NOTE — PROGRESS NOTES
Neurology       Patient Care Team:  Rolando Jeronimo MD as PCP - General (Internal Medicine)    Chief complaint:   Chief Complaint   Patient presents with   • Stroke        History: Altered mental status.  According to the  she was very sharp and it few days ago.  She was started on Kevzara which is a medication for rheumatoid arthritis about a month ago and she had 2 doses.  About a month ago thyroid medications were stopped that she was taking for a long time.  Her TSH is still within normal range.  But she wants to sleep all the time now.  She does not have any headaches nausea vomiting.  She remains confused and disoriented.  She is at least not restless.  She had lower back pain lumbar spine MRI shows severe degenerative changes of multiple levels.  L2-L3 level there is severe spinal stenosis with crowding and compression of the cauda equina nerve roots.  There is also severe right foraminal stenosis.  At L4-L5 level there is severe spinal stenosis due to grade 2 anterior listhesis and facet arthropathy.  Also severe left foraminal narrowing noted.  There are other degenerative changes also.  Past Medical History:   Diagnosis Date   • Asthma    • Benign hypertension    • Disease of thyroid gland    • Family history of heart disease    • Family history of heart disease    • Hypercholesterolemia    • Rheumatoid arthritis (HCC)    • Rheumatoid arthritis (HCC)    • Supraventricular tachycardia (HCC)     first diagnosed 06/01/2012   • Supraventricular tachycardia (HCC)        Vital Signs   Vitals:    09/11/22 0045 09/11/22 0310 09/11/22 0700 09/11/22 0900   BP: 157/71 156/83 156/82    BP Location: Left arm Left arm Left arm    Patient Position: Lying Lying Lying    Pulse: 102 110 108 105   Resp: 18 18 18    Temp: 98.4 °F (36.9 °C) 98.7 °F (37.1 °C)     TempSrc: Oral Oral     SpO2: 98% 97% 94%    Weight:       Height:           Physical Exam:   General: Fairly built and nourished.  Disoriented x3.  Could not  even tell me where she is or what is the month or the year.              Neuro: Moves all 4 limbs well.  No neck stiffness.    Results Review:  Lumbar spine MRI.  Results from last 7 days   Lab Units 09/11/22  0540   WBC 10*3/mm3 6.17   HEMOGLOBIN g/dL 11.4*   HEMATOCRIT % 34.2   PLATELETS 10*3/mm3 165     Results from last 7 days   Lab Units 09/11/22  0540 09/11/22  0017 09/10/22  1914 09/10/22  1425 09/10/22  0437 09/09/22  1818 09/09/22 1817   SODIUM mmol/L 134*  135*  --  131* 132* 133*  133*  --  133*   POTASSIUM mmol/L 3.7  3.7 4.2  --   --  2.6*  --  3.1*   CHLORIDE mmol/L 99  --   --   --  90*  --  87*   CO2 mmol/L 23.0  --   --   --  29.0  --  25.0   BUN mg/dL 10  --   --   --  11  --  11   CREATININE mg/dL 0.56*  --   --   --  0.74 0.80 0.84   CALCIUM mg/dL 8.8  --   --   --  9.3  --  10.2   BILIRUBIN mg/dL  --   --   --   --  0.6  --   --    ALK PHOS U/L  --   --   --   --  35*  --   --    ALT (SGPT) U/L  --   --   --   --  25  --  34*   AST (SGOT) U/L  --   --   --   --  39*  --  43*   GLUCOSE mg/dL 93  --   --   --  106*  --  104*       Imaging Results (Last 24 Hours)     ** No results found for the last 24 hours. **          Assessment: Sudden onset of altered mental status.  I do not think Kevzara is causing the change in mentation.  It is possible stopping the thyroid supplements could have affected her mind and the brain.  Lumbar puncture has been ordered to look for the opportunistic infection because she is immunocompromised.  Prior to being on Kevzara she was on Humira for 4 to 5 years.  B12 and folate levels were normal.  RPR is pending.      Plan:  Free T4 and T3.    Comment:  Complicated case.         I discussed the patients findings and my recommendations with patient's family.    Diego Jeronimo MD  09/11/22  10:54 EDT

## 2022-09-11 NOTE — PROGRESS NOTES
[facility] Medicine Services  PROGRESS NOTE    Patient Name: Vane Villavicencio  : 1945  MRN: [id number]    Date of Admission: 2022  Primary Care Physician: Rolando Jeronimo MD    Subjective   Subjective     CC:  F/u weakness, confusion    HPI:  No acute overnight events, resting comfortably    ROS:  Gen- No fevers, chills  CV- No chest pain, palpitations  Resp- No cough, dyspnea  GI- No N/V/D, abd pain         Objective   Objective     Vital Signs:   Temp:  [97.8 °F (36.6 °C)-98.9 °F (37.2 °C)] 98.7 °F (37.1 °C)  Heart Rate:  [] 105  Resp:  [18] 18  BP: (141-157)/(71-87) 156/82     Physical Exam:  Constitutional: No acute distress, awake, alert  HENT: NCAT, mucous membranes moist  Respiratory: Clear to auscultation bilaterally, respiratory effort normal   Cardiovascular: RRR, holosystolic murmur present  Gastrointestinal: Positive bowel sounds, soft, nontender, nondistended  Musculoskeletal: No bilateral ankle edema  Psychiatric: Appropriate affect, cooperative  Neurologic: Oriented x 3, strength symmetric in all extremities, BLE tremors  Skin: No rashes      Results Reviewed:  LAB RESULTS:      Lab 22  0540 09/10/22  0437 22  2325 22  2116 22  1818 22  1817   WBC 6.17 5.91  --   --   --  7.11   HEMOGLOBIN 11.4* 11.9*  --   --   --  13.6   HEMOGLOBIN, POC  --   --   --   --  15.0  --    HEMATOCRIT 34.2 34.9  --   --   --  40.0   HEMATOCRIT POC  --   --   --   --  44  --    PLATELETS 165 151  --   --   --  163   NEUTROS ABS 2.59 1.24*  --   --   --  2.24   IMMATURE GRANS (ABS)  --   --   --   --   --  0.02   LYMPHS ABS  --   --   --   --   --  3.51*   MONOS ABS  --   --   --   --   --  1.19*   EOS ABS 0.00 0.12  --   --   --  0.04   MCV 85.7 83.7  --   --   --  84.6   SED RATE 10  --   --   --   --   --    CRP <0.30  --   --   --   --   --    PROCALCITONIN  --   --   --  0.03  --   --    LACTATE  --  1.7 2.8*  --   --   --    PROTIME  --   --    --   --  11.9*  --    APTT  --   --   --   --   --  28.8         Lab 09/11/22  0540 09/11/22  0017 09/10/22  1914 09/10/22  1425 09/10/22  0437 09/09/22  1818 09/09/22  1817   SODIUM 134*  135*  --  131* 132* 133*  133*  --  133*   POTASSIUM 3.7  3.7 4.2  --   --  2.6*  --  3.1*   CHLORIDE 99  --   --   --  90*  --  87*   CO2 23.0  --   --   --  29.0  --  25.0   ANION GAP 12.0  --   --   --  14.0  --  21.0*   BUN 10  --   --   --  11  --  11   CREATININE 0.56*  --   --   --  0.74 0.80 0.84   EGFR 95.3  --   --   --  84.5 76.9 72.6   GLUCOSE 93  --   --   --  106*  --  104*   CALCIUM 8.8  --   --   --  9.3  --  10.2   MAGNESIUM  --   --   --   --  1.6  --  1.6   HEMOGLOBIN A1C  --   --   --   --  5.90*  --   --    TSH  --   --   --   --   --   --  2.070         Lab 09/10/22  0437 09/09/22  1817   TOTAL PROTEIN 6.4  --    ALBUMIN 3.90  --    GLOBULIN 2.5  --    ALT (SGPT) 25 34*   AST (SGOT) 39* 43*   BILIRUBIN 0.6  --    ALK PHOS 35*  --          Lab 09/09/22 2116 09/09/22 1818 09/09/22 1817   TROPONIN T <0.010  --  <0.010   PROTIME  --  11.9*  --    INR  --  1.0  --          Lab 09/10/22  0437   CHOLESTEROL 228*   LDL CHOL 131*   HDL CHOL 57   TRIGLYCERIDES 225*         Lab 09/10/22  0437   FOLATE 11.50   VITAMIN B 12 1,143*         Brief Urine Lab Results  (Last result in the past 365 days)      Color   Clarity   Blood   Leuk Est   Nitrite   Protein   CREAT   Urine HCG        09/09/22 1930 Yellow   Clear   Negative   Negative   Negative   Negative                 Microbiology Results Abnormal     Procedure Component Value - Date/Time    Blood Culture - Blood, Hand, Right [[id number]]  (Normal) Collected: 09/10/22 0437    Lab Status: Preliminary result Specimen: Blood from Hand, Right Updated: 09/11/22 0733     Blood Culture No growth at 24 hours    Blood Culture - Blood, Arm, Left [[id number]]  (Normal) Collected: 09/10/22 0437    Lab Status: Preliminary result Specimen: Blood from Arm, Left Updated: 09/11/22  0733     Blood Culture No growth at 24 hours    Respiratory Panel PCR w/COVID-19(SARS-CoV-2) TITA/HELENA/BRANDY/PAD/COR/MAD/RISA In-House, NP Swab in UTM/VTM, 3-4 HR TAT - Swab, Nasopharynx [[id number]]  (Normal) Collected: 09/10/22 0506    Lab Status: Final result Specimen: Swab from Nasopharynx Updated: 09/10/22 0554     ADENOVIRUS, PCR Not Detected     Coronavirus 229E Not Detected     Coronavirus HKU1 Not Detected     Coronavirus NL63 Not Detected     Coronavirus OC43 Not Detected     COVID19 Not Detected     Human Metapneumovirus Not Detected     Human Rhinovirus/Enterovirus Not Detected     Influenza A PCR Not Detected     Influenza B PCR Not Detected     Parainfluenza Virus 1 Not Detected     Parainfluenza Virus 2 Not Detected     Parainfluenza Virus 3 Not Detected     Parainfluenza Virus 4 Not Detected     RSV, PCR Not Detected     Bordetella pertussis pcr Not Detected     Bordetella parapertussis PCR Not Detected     Chlamydophila pneumoniae PCR Not Detected     Mycoplasma pneumo by PCR Not Detected    Narrative:      In the setting of a positive respiratory panel with a viral infection PLUS a negative procalcitonin without other underlying concern for bacterial infection, consider observing off antibiotics or discontinuation of antibiotics and continue supportive care. If the respiratory panel is positive for atypical bacterial infection (Bordetella pertussis, Chlamydophila pneumoniae, or Mycoplasma pneumoniae), consider antibiotic de-escalation to target atypical bacterial infection.    COVID PRE-OP / PRE-PROCEDURE SCREENING ORDER (NO ISOLATION) - Swab, Nasopharynx [[id number]]  (Normal) Collected: 09/09/22 2115    Lab Status: Final result Specimen: Swab from Nasopharynx Updated: 09/09/22 2146    Narrative:      The following orders were created for panel order COVID PRE-OP / PRE-PROCEDURE SCREENING ORDER (NO ISOLATION) - Swab, Nasopharynx.  Procedure                               Abnormality         Status                      ---------                               -----------         ------                     COVID-19 and FLU A/B PCR...[[id number]]  Normal              Final result                 Please view results for these tests on the individual orders.    COVID-19 and FLU A/B PCR - Swab, Nasopharynx [[id number]]  (Normal) Collected: 09/09/22 2115    Lab Status: Final result Specimen: Swab from Nasopharynx Updated: 09/09/22 2146     COVID19 Not Detected     Influenza A PCR Not Detected     Influenza B PCR Not Detected    Narrative:      Fact sheet for providers: https://www.fda.gov/media/257902/download    Fact sheet for patients: https://www.fda.gov/media/347456/download    Test performed by PCR.          Adult Transthoracic Echo Complete W/ Cont if Necessary Per Protocol (With Agitated Saline)    Addendum Date: 9/10/2022    · Left ventricular systolic function is hyperdynamic (EF > 70%). · Left ventricular diastolic function is consistent with (grade I) impaired relaxation. · The cardiac valves are anatomically and functionally normal. · Nondiagnostic bubble study      Result Date: 9/10/2022  · Left ventricular diastolic function is consistent with (grade I) impaired relaxation. · The cardiac valves are anatomically and functionally normal. · Nondiagnostic bubble study      CT Angiogram Neck    Result Date: 9/9/2022  Examination: CT ANGIOGRAM NECK-, CT ANGIOGRAM HEAD W AI ANALYSIS OF LVO-  Date of Exam: 9/9/2022 6:13 PM  Indication: Stroke, follow up.  Comparison: None available.  Technique: Axial volumetric contrast-enhanced CT angiographic images of the head and neck were acquired utilizing 115 mL Isovue-370  IV contrast. 3-D reconstructions were created for interpretation. Automated exposure control and iterative reconstruction methods were used. AI analysis of LVO was utilized for the CTA Head imaging portion of the study.   Findings: Aortic arch: There is minimal plaque in the aortic arch.  There is 4 vessel arch  anatomy with the left vertebral artery arising directly from the aortic arch. The right brachiocephalic and visualized bilateral subclavian arteries are within normal limits.  Right carotid: The right CCA arises as expected from the brachiocephalic trunk.  The CCA follows a normal course and appears normal caliber. There is minimal CCA calcified plaque. The carotid bifurcation is unremarkable.  The external carotid artery and distal branches appear within normal limits.  The cervical internal carotid artery follows a normal course and appears normal caliber throughout the neck and into the head.  The intracranial ICA segments appear within normal limits. The ophthalmic artery origin is normal.  The A1 and M1 segments appear within normal limits.  The visualized distal EDDIE and MCA branches appear patent.  There is  a patent  anterior communicating artery. There is a patent  posterior communicating artery.  Left carotid: The left CCA arises as expected from the aortic arch.  The CCA follows a normal course and appears normal caliber.  There is mild nonstenosing plaque at the carotid bifurcation and distal CCA. The external carotid artery and distal branches appear within normal limits.  The cervical internal carotid artery follows a normal course and appears normal caliber throughout the neck and into the head.  The intracranial ICA segments appear within normal limits.  The ophthalmic artery origin is normal.  The A1 and M1 segments appear within normal limits.  The visualized distal EDDIE and MCA branches appear patent. There is a patent  posterior communicating artery.  Posterior circulation:Left vertebral artery arises from the aortic arch and the right vertebral artery arises from the subclavian artery. There is right vertebral artery dominance. The vertebral arteries follow a normal course and appear normal caliber throughout the neck and into the head.  The V4 segments are patent.  Visualized posterior inferior  cerebellar arteries are within normal limits.  The basilar artery is normal caliber.  Superior cerebellar arteries are patent.  Bilateral P1 segments and posterior cerebral arteries appear within normal limits.   Nonvascular findings: Intracranial structures appear unremarkable. There is evidence of prior lens replacement. There is severe obstructive paranasal sinus mucosal disease throughout the right side. Mastoids are well aerated. Superficial soft tissues and underlying musculature appear within normal limits.  The lung apices are clear.  The thyroid and salivary glands appear unremarkable.  The suprahyoid and infrahyoid spaces of the neck appear unremarkable.  There is no evidence of cervical lymphadenopathy or a neck mass.  There are no acute osseous abnormalities or destructive bone lesions. There are moderate cervical degenerative changes. No high-grade spinal canal stenosis. There is 3 mm anterolisthesis of C4 on C5 and there is severe C5-C6 disc degeneration.      Impression:  1. Minimal atherosclerotic plaque without evidence of stenosis, occlusion, aneurysm or dissection in the neck or the head. 2. Cervical degenerative changes. 3. 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  This report was finalized on 9/9/2022 6:57 PM by Omar Hughes MD.      MRI Brain Without Contrast    Result Date: 9/10/2022  EXAMINATION: MRI BRAIN WITHOUT CONTRAST.  DATE: 9/10/2022 2:56 AM  INDICATION: Stroke follow-up.  COMPARISON: CT 9/9/2022  TECHNIQUE:  Sagittal T1, axial T1, T2, FLAIR, diffusion, SWI, coronal T1 images through the brain. No contrast. FINDINGS:  Intracranial contents: Ventricular and cisternal spaces are prominent from volume loss. Mild periventricular and subcortical white matter T2 hyperintensities, also noted in the jade. No dominant mass, midline shift, hydrocephalus, extra axial fluid collection or acute hemorrhage.  Flow voids of the proximal major cerebral arteries are  patent on T2 images. Diffusion sequence: No focus of restricted diffusion to suggest acute ischemia. Limited due to artifacts. Blood sensitive sequence: No significant blood product. Extracranial soft tissues: Mild mucosal thickening right frontal sinus. Opacified right ethmoid air cells. Opacified right maxillary sinus. Opacified right sphenoid sinus. Mild mucosal thickening left maxillary sinus and left sphenoid sinus. Retention cyst or polyp inferior left maxillary sinus. Increased signal right greater than left mastoid air cells. Cataract surgery.     Impression: 1. No acute infarct. 2. Mild changes chronic small vessel ischemic disease. Volume loss. 3. Severe right-sided paranasal sinus disease. Mastoid effusions.  Electronically signed by:  Huey Briones M.D.  9/10/2022 1:47 AM Mountain Time    MRI Lumbar Spine Without Contrast    Result Date: 9/10/2022  EXAMINATION: MRI LUMBAR SPINE WO CONTRAST DATE: 9/9/2022 11:41 PM  INDICATION: back pain, recent epidural injection, RLE weakness  COMPARISON: None available. TECHNIQUE: Sagittal and axial noncontrast images of the lumbar spine were obtained in the usual manner. FINDINGS: For this report, there are 5 lumbar type vertebral bodies. Moderate scoliosis. 8 mm grade 2 anterolisthesis of L4 on L5. Conus terminates at L2. Nerve roots of the cauda equina are normal. No acute fractures. Vertebral body heights are maintained. No concerning marrow signal. Imaged portions of the sacrum and sacroiliac joints are within normal limits. Imaged abdominal soft tissues are unremarkable. Moderate atrophy of the lower lumbar paraspinal muscles. More superiorly, mild atrophy of the right posterior paraspinal muscles at the level of the thoracolumbar junction. Degenerative Changes: T12-L1: Seen only on sagittal view. Disc height loss. Disc bulge. Facet arthropathy. Mild bilateral foraminal narrowing. No significant spinal canal narrowing. L1-L2: Left eccentric disc bulge. Mild  facet arthropathy. Mild bilateral foraminal narrowing. No significant spinal canal narrowing. L2-L3: Loss of disc height. Large diffuse disc extrusion. Moderate facet arthropathy. Severe spinal canal narrowing. Thecal sac measures 3 mm in AP dimension. There is severe bilateral subarticular zone narrowing, worse on the left. Severe right and moderate left foraminal narrowing. L3-L4: Left eccentric disc bulge. Moderate facet arthropathy. Moderate bilateral foraminal narrowing. Mild left subarticular zone narrowing. Moderate spinal canal narrowing. Thecal sac measures 7 mm in AP dimension. L4-L5: Grade 2 anterolisthesis with uncovering the superior aspect of the disc. Severe facet arthropathy. Moderate right and severe left foraminal narrowing. Severe bilateral subarticular zone narrowing, worse on the left. Severe thecal sac stenosis which measures 5 mm in AP dimension. L5-S1: Mild facet arthropathy. Small disc bulge. No significant spinal canal or foraminal narrowing.     Impression: 1.  Severe degenerative changes of the lumbar spine. Moderate scoliosis. 2.  At L2-L3, there is severe spinal canal stenosis with crowding/compression of the cauda equina nerve roots due to a circumferential disc extrusion and facet arthropathy. There is also severe right foraminal narrowing. 3.  At L4-L5, there is severe spinal canal stenosis due to grade 2 anterolisthesis and facet arthropathy. There is also severe left foraminal narrowing. 4.  Additional milder degenerative changes detailed above. Electronically signed by:  Jose Alfredo Finch DO  9/9/2022 11:09 PM Mountain Time    XR Chest 1 View    Result Date: 9/9/2022  Examination: XR CHEST 1 VW-  Date of Exam: 9/9/2022 6:44 PM  Indication: Acute Stroke Protocol (onset < 12 hrs).  Comparison: None available.  Technique: Single radiographic view of the chest was obtained.  Findings: There is no pneumothorax, pleural effusion or focal airspace consolidation. Cardiomediastinal silhouette  is unremarkable. Pulmonary vasculature appears within normal limits. Regional bones appear grossly intact. There is a retrocardiac rounded density containing gas, likely large hiatal hernia.      Impression:  1. No acute cardiopulmonary abnormality. 2. Suspected hiatal hernia.  This report was finalized on 9/9/2022 7:34 PM by Omar Hughes MD.      CT Head Without Contrast Stroke Protocol    Result Date: 9/9/2022  Examination: CT HEAD WO CONTRAST STROKE PROTOCOL-  Date of Exam: 9/9/2022 6:06 PM  Indication: Neuro deficit, acute, stroke suspected.  Comparison: None available.  Technique: Axial noncontrast CT imaging of the head was performed. Automated exposure control and iterative reconstruction methods were used.  Findings: Superficial soft tissues appear within normal limits. The calvarium is intact.  There is complete filling of the right maxillary sinus and the right sphenoid sinus with mucosal disease. Moderate right ethmoid sinus mucosal disease. The frontal sinuses are aplastic. The mastoids appear well aerated. There is thinning of the orbital lenses bilaterally suggestive of prior lens replacement. There is no acute intracranial hemorrhage.  No mass effect or midline shift.  No abnormal extra-axial collections.  Gray-white differentiation is within normal limits.  There is mild patchy periventricular white matter hypoattenuation. Ventricular size and configuration is normal for age.       Impression:  1. No acute intracranial abnormality. 2. Mild chronic small vessel ischemic change. 3. Severe obstructive right-sided paranasal sinus mucosal disease.  This report was finalized on 9/9/2022 6:14 PM by Omar Hughes MD.      CT Angiogram Head w AI Analysis of LVO    Result Date: 9/9/2022  Examination: CT ANGIOGRAM NECK-, CT ANGIOGRAM HEAD W AI ANALYSIS OF LVO-  Date of Exam: 9/9/2022 6:13 PM  Indication: Stroke, follow up.  Comparison: None available.  Technique: Axial volumetric contrast-enhanced CT  angiographic images of the head and neck were acquired utilizing 115 mL Isovue-370  IV contrast. 3-D reconstructions were created for interpretation. Automated exposure control and iterative reconstruction methods were used. AI analysis of LVO was utilized for the CTA Head imaging portion of the study.   Findings: Aortic arch: There is minimal plaque in the aortic arch.  There is 4 vessel arch anatomy with the left vertebral artery arising directly from the aortic arch. The right brachiocephalic and visualized bilateral subclavian arteries are within normal limits.  Right carotid: The right CCA arises as expected from the brachiocephalic trunk.  The CCA follows a normal course and appears normal caliber. There is minimal CCA calcified plaque. The carotid bifurcation is unremarkable.  The external carotid artery and distal branches appear within normal limits.  The cervical internal carotid artery follows a normal course and appears normal caliber throughout the neck and into the head.  The intracranial ICA segments appear within normal limits. The ophthalmic artery origin is normal.  The A1 and M1 segments appear within normal limits.  The visualized distal EDDIE and MCA branches appear patent.  There is  a patent  anterior communicating artery. There is a patent  posterior communicating artery.  Left carotid: The left CCA arises as expected from the aortic arch.  The CCA follows a normal course and appears normal caliber.  There is mild nonstenosing plaque at the carotid bifurcation and distal CCA. The external carotid artery and distal branches appear within normal limits.  The cervical internal carotid artery follows a normal course and appears normal caliber throughout the neck and into the head.  The intracranial ICA segments appear within normal limits.  The ophthalmic artery origin is normal.  The A1 and M1 segments appear within normal limits.  The visualized distal EDDIE and MCA branches appear patent. There is  a patent  posterior communicating artery.  Posterior circulation:Left vertebral artery arises from the aortic arch and the right vertebral artery arises from the subclavian artery. There is right vertebral artery dominance. The vertebral arteries follow a normal course and appear normal caliber throughout the neck and into the head.  The V4 segments are patent.  Visualized posterior inferior cerebellar arteries are within normal limits.  The basilar artery is normal caliber.  Superior cerebellar arteries are patent.  Bilateral P1 segments and posterior cerebral arteries appear within normal limits.   Nonvascular findings: Intracranial structures appear unremarkable. There is evidence of prior lens replacement. There is severe obstructive paranasal sinus mucosal disease throughout the right side. Mastoids are well aerated. Superficial soft tissues and underlying musculature appear within normal limits.  The lung apices are clear.  The thyroid and salivary glands appear unremarkable.  The suprahyoid and infrahyoid spaces of the neck appear unremarkable.  There is no evidence of cervical lymphadenopathy or a neck mass.  There are no acute osseous abnormalities or destructive bone lesions. There are moderate cervical degenerative changes. No high-grade spinal canal stenosis. There is 3 mm anterolisthesis of C4 on C5 and there is severe C5-C6 disc degeneration.      Impression:  1. Minimal atherosclerotic plaque without evidence of stenosis, occlusion, aneurysm or dissection in the neck or the head. 2. Cervical degenerative changes. 3. 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  This report was finalized on 9/9/2022 6:57 PM by Omar Hughes MD.      CT CEREBRAL PERFUSION WITH & WITHOUT CONTRAST    Result Date: 9/9/2022  DATE OF EXAM: 9/9/2022 6:13 PM  PROCEDURE: CT CEREBRAL PERFUSION W WO CONTRAST-  INDICATIONS: Neuro deficit, acute, stroke suspected  COMPARISON: No comparisons available.   TECHNIQUE: Routine transaxial cuts were obtained through the head without administration of contrast. Routine transaxial cuts were then obtained through the head following the intravenous administration of 115 mL of Isovue 370. Core blood volume, core blood flow, mean transit time, and Tmax images were obtained utilizing the Rapid software protocol. A limited CT angiogram of the head was also performed to measure the blood vessel density.  The radiation dose reduction device was turned on for each scan per the ALARA (As Low as Reasonably Achievable) protocol.  FINDINGS: There is no significant perfusion abnormality in the brain. Cerebral blood flow, cerebral blood volume and mean transit time appear within normal limits.      Impression: No perfusion abnormalities in the brain. No evidence of brain risk or cortical infarct.  This report was finalized on 9/9/2022 6:46 PM by Omar Hughes MD.        Results for orders placed during the hospital encounter of 09/09/22    Adult Transthoracic Echo Complete W/ Cont if Necessary Per Protocol (With Agitated Saline)    Interpretation Summary  · Left ventricular systolic function is hyperdynamic (EF > 70%).  · Left ventricular diastolic function is consistent with (grade I) impaired relaxation.  · The cardiac valves are anatomically and functionally normal.  · Nondiagnostic bubble study      I have reviewed the medications:  Scheduled Meds:hold, 1 each, Does not apply, BID  Pharmacy Consult, , Does not apply, Once  aspirin, 325 mg, Oral, Daily   Or  aspirin, 300 mg, Rectal, Daily  atorvastatin, 80 mg, Oral, Nightly  cefTRIAXone, 2 g, Intravenous, Q12H  cholecalciferol, 2,000 Units, Oral, Daily  multivitamin with minerals, 1 tablet, Oral, Daily  predniSONE, 5 mg, Oral, Daily  sodium chloride, 10 mL, Intravenous, Q12H      Continuous Infusions:   PRN Meds:.acetaminophen  •  fluticasone  •  LORazepam  •  magnesium sulfate **OR** magnesium sulfate **OR** magnesium sulfate  •   potassium chloride **OR** potassium chloride **OR** potassium chloride  •  potassium phosphate infusion greater than 15 mMoles **OR** potassium phosphate infusion greater than 15 mMoles **OR** potassium phosphate **OR** sodium phosphate IVPB **OR** sodium phosphate IVPB **OR** sodium phosphate IVPB  •  sodium chloride  •  sodium chloride    Assessment & Plan   Assessment & Plan     Active Hospital Problems    Diagnosis  POA   • AMS (altered mental status) [R41.82]  Yes   • Hypokalemia [E87.6]  Unknown   • Benign hypertension [I10]  Yes   • Hypercholesterolemia [E78.00]  Yes   • Rheumatoid arthritis (HCC) [M06.9]  Yes      Resolved Hospital Problems   No resolved problems to display.        Brief Hospital Course to date:  Vane Villavicencio is a 77 y.o. female w hx of HTN, HLD, SVT, RA who is presenting with altered mental status. Code stroke initiated in ED    Altered mental status  TIA vs CVA vs infectious encephalopathy  - CT of head, CT perfusion, CTA of head and neck show no significant stenosis or perfusion abnormalities, no acute intracranial findings  - MRI brain without contrast shows no acute infarct, shows chronic small vessel ischemic disease  - Consider EEG, defer to neuro  - UA negative  - UDS positive for benzo's and opiates: Vinny verified  - TSH: 2.070, free t4 and t3 pending  - ASA given in the ED  - Consult PT/OT/SLP  -echo shows hyperdynamic systolic function. Diastolic dysfunction, grade I  - Hold sedating medications  - Fall precautions  - Up with assistance  --ID consulted given concern of infection in underlying chronic immunosuppression- LP pending. Recommends continuing ceftriaxone  --check resp panel pcr to look for possible viral etiology--neg  -ESR, CRP neg        Low back pain  --recent steroid injections  --MRI showing cauda equina compression at nerve roots, neurosurgery said no acute intervention at this time.  They would happy to see her outpatient.    --No saddle paresthesia, spoke  with  and has not had any incontinence of bladder or bowel today. She can lift her legs off bed currently with some difficulty   --consider consult inpatient if no improvement     hypokalemia  - Replace per protocol  - Check magnesium  - Telemetry monitoring  - Repeat labs in a.m.     essential hypertension  - BP on arrival 141/109  - home amlodipine and metoprolol reordered     hyperlipidemia  - FLP shows LDL above goal  - High-dose statin      rheumatoid arthritis  - Started kevzara injections 2 weeks ago previously on orencia, received second injection prior to admission  - On a prednisone and Arava (cont prednisone, hold arava until infection ruled out)  - Follows with rheumatology at [facility]    Expected Discharge Location and Transportation: home  Expected Discharge Date: 9/14    DVT prophylaxis:  Mechanical DVT prophylaxis orders are present.     AM-PAC 6 Clicks Score (PT): 13 (09/10/22 2043)    CODE STATUS:   Code Status and Medical Interventions:   Ordered at: 09/09/22 4894     Level Of Support Discussed With:    Patient     Code Status (Patient has no pulse and is not breathing):    CPR (Attempt to Resuscitate)     Medical Interventions (Patient has pulse or is breathing):    Full Support       Shannen Marks MD  09/11/22

## 2022-09-12 ENCOUNTER — APPOINTMENT (OUTPATIENT)
Dept: GENERAL RADIOLOGY | Facility: HOSPITAL | Age: 77
End: 2022-09-12

## 2022-09-12 LAB
ANION GAP SERPL CALCULATED.3IONS-SCNC: 13 MMOL/L (ref 5–15)
APPEARANCE CSF: ABNORMAL
APPEARANCE CSF: CLEAR
BASOPHILS # BLD MANUAL: 0.14 10*3/MM3 (ref 0–0.2)
BASOPHILS NFR BLD MANUAL: 2 % (ref 0–1.5)
BUN SERPL-MCNC: 15 MG/DL (ref 8–23)
BUN/CREAT SERPL: 25 (ref 7–25)
C GATTII+NEOFOR DNA CSF QL NAA+NON-PROBE: NOT DETECTED
CALCIUM SPEC-SCNC: 8.5 MG/DL (ref 8.6–10.5)
CHLORIDE SERPL-SCNC: 95 MMOL/L (ref 98–107)
CMV DNA CSF QL NAA+PROBE: NOT DETECTED
CO2 SERPL-SCNC: 21 MMOL/L (ref 22–29)
COLOR CSF: YELLOW
COLOR CSF: YELLOW
COLOR SPUN CSF: ABNORMAL
COLOR SPUN CSF: ABNORMAL
CREAT SERPL-MCNC: 0.6 MG/DL (ref 0.57–1)
CRYPTOC AG CSF QL: NEGATIVE
CYTOLOGIST CVX/VAG CYTO: NORMAL
DEPRECATED RDW RBC AUTO: 45.3 FL (ref 37–54)
E COLI K1 DNA CSF QL NAA+NON-PROBE: NOT DETECTED
EGFRCR SERPLBLD CKD-EPI 2021: 93.7 ML/MIN/1.73
EOSINOPHIL # BLD MANUAL: 0.21 10*3/MM3 (ref 0–0.4)
EOSINOPHIL NFR BLD MANUAL: 3 % (ref 0.3–6.2)
EOSINOPHIL NFR CSF MANUAL: 1 %
ERYTHROCYTE [DISTWIDTH] IN BLOOD BY AUTOMATED COUNT: 14.7 % (ref 12.3–15.4)
EV RNA CSF QL NAA+PROBE: NOT DETECTED
GLUCOSE CSF-MCNC: 49 MG/DL (ref 40–70)
GLUCOSE SERPL-MCNC: 82 MG/DL (ref 65–99)
GP B STREP DNA SPEC QL NAA+PROBE: NOT DETECTED
HAEM INFLU SEROTYP DNA SPEC NAA+PROBE: NOT DETECTED
HCT VFR BLD AUTO: 35.3 % (ref 34–46.6)
HGB BLD-MCNC: 11.8 G/DL (ref 12–15.9)
HHV6 DNA CSF QL NAA+PROBE: NOT DETECTED
HOLD SPECIMEN: NORMAL
HSV1 DNA CSF QL NAA+PROBE: NOT DETECTED
HSV2 DNA CSF QL NAA+PROBE: NOT DETECTED
L MONOCYTOG RRNA SPEC QL PROBE: NOT DETECTED
LYMPHOCYTES # BLD MANUAL: 3.45 10*3/MM3 (ref 0.7–3.1)
LYMPHOCYTES NFR BLD MANUAL: 11 % (ref 5–12)
LYMPHOCYTES NFR CSF MANUAL: 81 % (ref 62–100)
LYMPHOCYTES NFR CSF MANUAL: 82 % (ref 62–100)
MACROPHAGES NFR FLD: 14 %
MACROPHAGES NFR FLD: 17 %
MCH RBC QN AUTO: 28.6 PG (ref 26.6–33)
MCHC RBC AUTO-ENTMCNC: 33.4 G/DL (ref 31.5–35.7)
MCV RBC AUTO: 85.5 FL (ref 79–97)
MONOCYTES # BLD: 0.77 10*3/MM3 (ref 0.1–0.9)
MONOCYTES NFR CSF MANUAL: 1 % (ref 0–36)
MONOCYTES NFR CSF MANUAL: 4 % (ref 0–36)
N MEN DNA SPEC QL NAA+PROBE: NOT DETECTED
NEUTROPHILS # BLD AUTO: 2.46 10*3/MM3 (ref 1.7–7)
NEUTROPHILS NFR BLD MANUAL: 31 % (ref 42.7–76)
NEUTS BAND NFR BLD MANUAL: 4 % (ref 0–5)
NRBC SPEC MANUAL: 0 /100 WBC (ref 0–0.2)
OVALOCYTES BLD QL SMEAR: ABNORMAL
PARECHOVIRUS A RNA CSF QL NAA+NON-PROBE: NOT DETECTED
PATH INTERP BLD-IMP: NORMAL
PLAT MORPH BLD: NORMAL
PLATELET # BLD AUTO: 160 10*3/MM3 (ref 140–450)
PMV BLD AUTO: 11.3 FL (ref 6–12)
POTASSIUM SERPL-SCNC: 3.7 MMOL/L (ref 3.5–5.2)
PROT CSF-MCNC: 542.3 MG/DL (ref 15–45)
QT INTERVAL: 366 MS
QTC INTERVAL: 440 MS
RBC # BLD AUTO: 4.13 10*6/MM3 (ref 3.77–5.28)
RBC # CSF MANUAL: 148 /MM3 (ref 0–5)
RBC # CSF MANUAL: 46 /MM3 (ref 0–5)
S PNEUM DNA CSF QL NAA+NON-PROBE: NOT DETECTED
SODIUM SERPL-SCNC: 129 MMOL/L (ref 136–145)
SODIUM SERPL-SCNC: 130 MMOL/L (ref 136–145)
SODIUM SERPL-SCNC: 132 MMOL/L (ref 136–145)
SODIUM SERPL-SCNC: 133 MMOL/L (ref 136–145)
SPECIMEN VOL CSF: 7 ML
SPECIMEN VOL CSF: 7 ML
TUBE # CSF: 1
TUBE # CSF: 4
VARIANT LYMPHS NFR BLD MANUAL: 20 % (ref 19.6–45.3)
VARIANT LYMPHS NFR BLD MANUAL: 29 % (ref 0–5)
VZV DNA CSF QL NAA+PROBE: DETECTED
WBC # CSF MANUAL: 1238 /MM3 (ref 0–5)
WBC # CSF MANUAL: 1806 /MM3 (ref 0–5)
WBC MORPH BLD: NORMAL
WBC NRBC COR # BLD: 7.04 10*3/MM3 (ref 3.4–10.8)

## 2022-09-12 PROCEDURE — 86694 HERPES SIMPLEX NES ANTBDY: CPT | Performed by: PSYCHIATRY & NEUROLOGY

## 2022-09-12 PROCEDURE — 82945 GLUCOSE OTHER FLUID: CPT | Performed by: INTERNAL MEDICINE

## 2022-09-12 PROCEDURE — 87899 AGENT NOS ASSAY W/OPTIC: CPT | Performed by: INTERNAL MEDICINE

## 2022-09-12 PROCEDURE — 87116 MYCOBACTERIA CULTURE: CPT | Performed by: INTERNAL MEDICINE

## 2022-09-12 PROCEDURE — 86695 HERPES SIMPLEX TYPE 1 TEST: CPT | Performed by: PSYCHIATRY & NEUROLOGY

## 2022-09-12 PROCEDURE — 25010000002 ONDANSETRON PER 1 MG: Performed by: STUDENT IN AN ORGANIZED HEALTH CARE EDUCATION/TRAINING PROGRAM

## 2022-09-12 PROCEDURE — 87483 CNS DNA AMP PROBE TYPE 12-25: CPT | Performed by: INTERNAL MEDICINE

## 2022-09-12 PROCEDURE — 86735 MUMPS ANTIBODY: CPT | Performed by: PSYCHIATRY & NEUROLOGY

## 2022-09-12 PROCEDURE — 0 LIDOCAINE 1 % SOLUTION: Performed by: PHYSICIAN ASSISTANT

## 2022-09-12 PROCEDURE — 87206 SMEAR FLUORESCENT/ACID STAI: CPT | Performed by: INTERNAL MEDICINE

## 2022-09-12 PROCEDURE — 86765 RUBEOLA ANTIBODY: CPT | Performed by: PSYCHIATRY & NEUROLOGY

## 2022-09-12 PROCEDURE — 87070 CULTURE OTHR SPECIMN AEROBIC: CPT | Performed by: INTERNAL MEDICINE

## 2022-09-12 PROCEDURE — 63710000001 PREDNISONE PER 5 MG: Performed by: NURSE PRACTITIONER

## 2022-09-12 PROCEDURE — 99231 SBSQ HOSP IP/OBS SF/LOW 25: CPT | Performed by: PSYCHIATRY & NEUROLOGY

## 2022-09-12 PROCEDURE — 86696 HERPES SIMPLEX TYPE 2 TEST: CPT | Performed by: PSYCHIATRY & NEUROLOGY

## 2022-09-12 PROCEDURE — 85060 BLOOD SMEAR INTERPRETATION: CPT | Performed by: STUDENT IN AN ORGANIZED HEALTH CARE EDUCATION/TRAINING PROGRAM

## 2022-09-12 PROCEDURE — 84157 ASSAY OF PROTEIN OTHER: CPT | Performed by: INTERNAL MEDICINE

## 2022-09-12 PROCEDURE — 85007 BL SMEAR W/DIFF WBC COUNT: CPT | Performed by: STUDENT IN AN ORGANIZED HEALTH CARE EDUCATION/TRAINING PROGRAM

## 2022-09-12 PROCEDURE — 84295 ASSAY OF SERUM SODIUM: CPT | Performed by: NURSE PRACTITIONER

## 2022-09-12 PROCEDURE — 86361 T CELL ABSOLUTE COUNT: CPT | Performed by: INTERNAL MEDICINE

## 2022-09-12 PROCEDURE — 86788 WEST NILE VIRUS AB IGM: CPT | Performed by: PSYCHIATRY & NEUROLOGY

## 2022-09-12 PROCEDURE — 87015 SPECIMEN INFECT AGNT CONCNTJ: CPT | Performed by: INTERNAL MEDICINE

## 2022-09-12 PROCEDURE — 86592 SYPHILIS TEST NON-TREP QUAL: CPT | Performed by: INTERNAL MEDICINE

## 2022-09-12 PROCEDURE — 87075 CULTR BACTERIA EXCEPT BLOOD: CPT | Performed by: INTERNAL MEDICINE

## 2022-09-12 PROCEDURE — 86787 VARICELLA-ZOSTER ANTIBODY: CPT | Performed by: PSYCHIATRY & NEUROLOGY

## 2022-09-12 PROCEDURE — 25010000002 ACYCLOVIR PER 5 MG: Performed by: STUDENT IN AN ORGANIZED HEALTH CARE EDUCATION/TRAINING PROGRAM

## 2022-09-12 PROCEDURE — 87327 CRYPTOCOCCUS NEOFORM AG IA: CPT | Performed by: INTERNAL MEDICINE

## 2022-09-12 PROCEDURE — 25010000002 CEFTRIAXONE PER 250 MG: Performed by: INTERNAL MEDICINE

## 2022-09-12 PROCEDURE — 87205 SMEAR GRAM STAIN: CPT | Performed by: INTERNAL MEDICINE

## 2022-09-12 PROCEDURE — 89051 BODY FLUID CELL COUNT: CPT | Performed by: INTERNAL MEDICINE

## 2022-09-12 PROCEDURE — 85025 COMPLETE CBC W/AUTO DIFF WBC: CPT | Performed by: STUDENT IN AN ORGANIZED HEALTH CARE EDUCATION/TRAINING PROGRAM

## 2022-09-12 PROCEDURE — 99232 SBSQ HOSP IP/OBS MODERATE 35: CPT | Performed by: STUDENT IN AN ORGANIZED HEALTH CARE EDUCATION/TRAINING PROGRAM

## 2022-09-12 PROCEDURE — 80048 BASIC METABOLIC PNL TOTAL CA: CPT | Performed by: STUDENT IN AN ORGANIZED HEALTH CARE EDUCATION/TRAINING PROGRAM

## 2022-09-12 PROCEDURE — 87102 FUNGUS ISOLATION CULTURE: CPT | Performed by: INTERNAL MEDICINE

## 2022-09-12 PROCEDURE — 86789 WEST NILE VIRUS ANTIBODY: CPT | Performed by: PSYCHIATRY & NEUROLOGY

## 2022-09-12 RX ORDER — FLUCONAZOLE 100 MG/1
400 TABLET ORAL EVERY 24 HOURS
Status: DISCONTINUED | OUTPATIENT
Start: 2022-09-12 | End: 2022-09-12

## 2022-09-12 RX ORDER — LIDOCAINE HYDROCHLORIDE 10 MG/ML
5 INJECTION, SOLUTION INFILTRATION; PERINEURAL ONCE
Status: COMPLETED | OUTPATIENT
Start: 2022-09-12 | End: 2022-09-12

## 2022-09-12 RX ADMIN — ACYCLOVIR SODIUM 620 MG: 500 INJECTION, SOLUTION INTRAVENOUS at 23:05

## 2022-09-12 RX ADMIN — ASPIRIN 325 MG ORAL TABLET 325 MG: 325 PILL ORAL at 08:45

## 2022-09-12 RX ADMIN — MULTIPLE VITAMINS W/ MINERALS TAB 1 TABLET: TAB at 08:40

## 2022-09-12 RX ADMIN — ONDANSETRON 4 MG: 2 INJECTION INTRAMUSCULAR; INTRAVENOUS at 10:31

## 2022-09-12 RX ADMIN — PREDNISONE 5 MG: 5 TABLET ORAL at 08:40

## 2022-09-12 RX ADMIN — Medication 10 ML: at 08:41

## 2022-09-12 RX ADMIN — Medication 2000 UNITS: at 08:40

## 2022-09-12 RX ADMIN — LIDOCAINE HYDROCHLORIDE 5 ML: 10 INJECTION, SOLUTION INFILTRATION; PERINEURAL at 13:25

## 2022-09-12 RX ADMIN — ATORVASTATIN CALCIUM 80 MG: 40 TABLET, FILM COATED ORAL at 20:03

## 2022-09-12 RX ADMIN — METOPROLOL TARTRATE 50 MG: 50 TABLET, FILM COATED ORAL at 08:41

## 2022-09-12 RX ADMIN — SODIUM CHLORIDE 2 G: 900 INJECTION INTRAVENOUS at 05:36

## 2022-09-12 RX ADMIN — METOPROLOL TARTRATE 50 MG: 50 TABLET, FILM COATED ORAL at 20:03

## 2022-09-12 RX ADMIN — AMLODIPINE BESYLATE 5 MG: 5 TABLET ORAL at 08:40

## 2022-09-12 RX ADMIN — Medication 10 ML: at 20:09

## 2022-09-12 NOTE — PROGRESS NOTES
"                    Clinical Nutrition       Patient Name: Vane Villavicencio  YOB: 1945  MRN: [id number]  Date of Encounter: 09/12/22 14:49 EDT  Admission date: 9/9/2022      Reason for Visit   NPO/Clear Liquid      EMR  Reviewed   Yes    Height: Height: 162 cm (63.78\")  Weight: Weight: 63.1 kg (139 lb 3.2 oz) (09/12/22 0500)  BMI: BMI (Calculated): 24.1    Problem:    Rheumatoid arthritis (HCC)    Hypercholesterolemia    Benign hypertension    AMS (altered mental status)    Hypokalemia       Reported/Observed/Food/Nutrition Related - Comments     Pt is now NPO x3d, reports feelings of hunger. Pt was NPO pending lumbar puncture per nursing, now complete and able to have tray. Denies difficulty chewing/swallowing - partially edentulous with ill-fitting denture plates. Pt endorsed ~10# wt loss in 2 months 2/2 decreased appetite. Denies N/V/D.        Current Nutrition Prescription     Diet Regular; Cardiac  Orders Placed This Encounter      Dietary Nutrition Supplements Magic Cup      DIET MESSAGE Please send pt a lunch tray. Now has a diet order. Thanks!      Average Intake from Charting: NPO    Nutrition Diagnosis     Problem Predicted suboptimal energy intake   Etiology NPO for procedure   Signs/Symptoms NPO x3d   Status:    Actions     Follow treatment progress, Await begin PO, Encourage intake, Supplement provided    Cardiac diet order placed  Provide Magic Cup QD    May need soft, whole foods if unable to tolerate regular texture    Monitor Per Protocol      Vilma Robbins MS, RD, LD  Time Spent: 25min        "

## 2022-09-12 NOTE — PROGRESS NOTES
[facility] Medicine Services  PROGRESS NOTE    Patient Name: Vane Villavicencio  : 1945  MRN: [id number]    Date of Admission: 2022  Primary Care Physician: Rolando Jeronimo MD    Subjective   Subjective     CC:  F/u weakness, confusion    HPI:  Seen after LP, resting comfortably, no acute headaches    ROS:  Gen- No fevers, chills  CV- No chest pain, palpitations  Resp- No cough, dyspnea  GI- No N/V/D, abd pain         Objective   Objective     Vital Signs:   Temp:  [98.7 °F (37.1 °C)-99.2 °F (37.3 °C)] 98.7 °F (37.1 °C)  Heart Rate:  [] 80  Resp:  [16-20] 16  BP: (131-145)/(67-80) 135/67     Physical Exam:  Constitutional: No acute distress, awake, alert  HENT: NCAT, mucous membranes moist  Respiratory: Clear to auscultation bilaterally, respiratory effort normal   Cardiovascular: RRR, holosystolic murmur present  Gastrointestinal: Positive bowel sounds, soft, nontender, nondistended  Musculoskeletal: No bilateral ankle edema  Psychiatric: Appropriate affect, cooperative  Neurologic: Oriented x 3, strength symmetric in all extremities, BLE tremors  Skin: No rashes      Results Reviewed:  LAB RESULTS:      Lab 22  0651 22  0540 09/10/22  0437 22  2325 22  2116 22  1818 22  1817   WBC 7.04 6.17 5.91  --   --   --  7.11   HEMOGLOBIN 11.8* 11.4* 11.9*  --   --   --  13.6   HEMOGLOBIN, POC  --   --   --   --   --  15.0  --    HEMATOCRIT 35.3 34.2 34.9  --   --   --  40.0   HEMATOCRIT POC  --   --   --   --   --  44  --    PLATELETS 160 165 151  --   --   --  163   NEUTROS ABS 2.46 2.59 1.24*  --   --   --  2.24   IMMATURE GRANS (ABS)  --   --   --   --   --   --  0.02   LYMPHS ABS  --   --   --   --   --   --  3.51*   MONOS ABS  --   --   --   --   --   --  1.19*   EOS ABS 0.21 0.00 0.12  --   --   --  0.04   MCV 85.5 85.7 83.7  --   --   --  84.6   SED RATE  --  10  --   --   --   --   --    CRP  --  <0.30  --   --   --   --   --     PROCALCITONIN  --   --   --   --  0.03  --   --    LACTATE  --   --  1.7 2.8*  --   --   --    PROTIME  --   --   --   --   --  11.9*  --    APTT  --   --   --   --   --   --  28.8         Lab 09/12/22  1500 09/12/22  0748 09/12/22  0651 09/11/22  1857 09/11/22  1038 09/11/22  0540 09/11/22  0017 09/10/22  1425 09/10/22  0437 09/09/22  1818 09/09/22 1817   SODIUM 133* 132* 129* 132* 132* 134*  135*  --    < > 133*  133*  --  133*   POTASSIUM  --   --  3.7  --   --  3.7  3.7 4.2  --  2.6*  --  3.1*   CHLORIDE  --   --  95*  --   --  99  --   --  90*  --  87*   CO2  --   --  21.0*  --   --  23.0  --   --  29.0  --  25.0   ANION GAP  --   --  13.0  --   --  12.0  --   --  14.0  --  21.0*   BUN  --   --  15  --   --  10  --   --  11  --  11   CREATININE  --   --  0.60  --   --  0.56*  --   --  0.74 0.80 0.84   EGFR  --   --  93.7  --   --  95.3  --   --  84.5 76.9 72.6   GLUCOSE  --   --  82  --   --  93  --   --  106*  --  104*   CALCIUM  --   --  8.5*  --   --  8.8  --   --  9.3  --  10.2   MAGNESIUM  --   --   --   --   --   --   --   --  1.6  --  1.6   HEMOGLOBIN A1C  --   --   --   --   --   --   --   --  5.90*  --   --    TSH  --   --   --   --   --   --   --   --   --   --  2.070    < > = values in this interval not displayed.         Lab 09/10/22  0437 09/09/22  1817   TOTAL PROTEIN 6.4  --    ALBUMIN 3.90  --    GLOBULIN 2.5  --    ALT (SGPT) 25 34*   AST (SGOT) 39* 43*   BILIRUBIN 0.6  --    ALK PHOS 35*  --          Lab 09/09/22 2116 09/09/22 1818 09/09/22 1817   TROPONIN T <0.010  --  <0.010   PROTIME  --  11.9*  --    INR  --  1.0  --          Lab 09/10/22  0437   CHOLESTEROL 228*   LDL CHOL 131*   HDL CHOL 57   TRIGLYCERIDES 225*         Lab 09/10/22  0437   FOLATE 11.50   VITAMIN B 12 1,143*         Brief Urine Lab Results  (Last result in the past 365 days)      Color   Clarity   Blood   Leuk Est   Nitrite   Protein   CREAT   Urine HCG        09/09/22 1930 Yellow   Clear   Negative   Negative    Negative   Negative                 Microbiology Results Abnormal     Procedure Component Value - Date/Time    Blood Culture - Blood, Hand, Right [[id number]]  (Normal) Collected: 09/10/22 0437    Lab Status: Preliminary result Specimen: Blood from Hand, Right Updated: 09/12/22 0731     Blood Culture No growth at 2 days    Blood Culture - Blood, Arm, Left [[id number]]  (Normal) Collected: 09/10/22 0437    Lab Status: Preliminary result Specimen: Blood from Arm, Left Updated: 09/12/22 0731     Blood Culture No growth at 2 days    Respiratory Panel PCR w/COVID-19(SARS-CoV-2) TITA/HELENA/BRANDY/PAD/COR/MAD/RISA In-House, NP Swab in UTM/VTM, 3-4 HR TAT - Swab, Nasopharynx [[id number]]  (Normal) Collected: 09/10/22 0506    Lab Status: Final result Specimen: Swab from Nasopharynx Updated: 09/10/22 0554     ADENOVIRUS, PCR Not Detected     Coronavirus 229E Not Detected     Coronavirus HKU1 Not Detected     Coronavirus NL63 Not Detected     Coronavirus OC43 Not Detected     COVID19 Not Detected     Human Metapneumovirus Not Detected     Human Rhinovirus/Enterovirus Not Detected     Influenza A PCR Not Detected     Influenza B PCR Not Detected     Parainfluenza Virus 1 Not Detected     Parainfluenza Virus 2 Not Detected     Parainfluenza Virus 3 Not Detected     Parainfluenza Virus 4 Not Detected     RSV, PCR Not Detected     Bordetella pertussis pcr Not Detected     Bordetella parapertussis PCR Not Detected     Chlamydophila pneumoniae PCR Not Detected     Mycoplasma pneumo by PCR Not Detected    Narrative:      In the setting of a positive respiratory panel with a viral infection PLUS a negative procalcitonin without other underlying concern for bacterial infection, consider observing off antibiotics or discontinuation of antibiotics and continue supportive care. If the respiratory panel is positive for atypical bacterial infection (Bordetella pertussis, Chlamydophila pneumoniae, or Mycoplasma pneumoniae), consider antibiotic  de-escalation to target atypical bacterial infection.    COVID PRE-OP / PRE-PROCEDURE SCREENING ORDER (NO ISOLATION) - Swab, Nasopharynx [[id number]]  (Normal) Collected: 09/09/22 2115    Lab Status: Final result Specimen: Swab from Nasopharynx Updated: 09/09/22 2146    Narrative:      The following orders were created for panel order COVID PRE-OP / PRE-PROCEDURE SCREENING ORDER (NO ISOLATION) - Swab, Nasopharynx.  Procedure                               Abnormality         Status                     ---------                               -----------         ------                     COVID-19 and FLU A/B PCR...[[id number]]  Normal              Final result                 Please view results for these tests on the individual orders.    COVID-19 and FLU A/B PCR - Swab, Nasopharynx [[id number]]  (Normal) Collected: 09/09/22 2115    Lab Status: Final result Specimen: Swab from Nasopharynx Updated: 09/09/22 2146     COVID19 Not Detected     Influenza A PCR Not Detected     Influenza B PCR Not Detected    Narrative:      Fact sheet for providers: https://www.fda.gov/media/755082/download    Fact sheet for patients: https://www.fda.gov/media/693339/download    Test performed by PCR.          IR LUMBAR PUNCTURE DIAGNOSTIC    Result Date: 9/12/2022  EXAMINATION: IR LUMBAR PUNCTURE DIAGNOSTIC-  INDICATION: ? Cns; R41.82-Altered mental status, unspecified; E87.6-Hypokalemia; R41.841-Cognitive communication deficit  TECHNIQUE: 6 seconds of fluoroscopic time was used for this procedure. 1 associated images were saved. Informed consent was obtained from the patient's family member.  The patient was placed in the prone position on the table. The back was prepped and draped in a sterile fashion. 1% lidocaine was used as a local anesthetic to the skin and subcutaneous tissues. Under fluoroscopic guidance a 22-gauge spinal needle was advanced at the L2-L3 level to the CSF space. Approximately 8 cc of xanthochromic CSF was returned  and collected in 4 numbered vials. Sample vials were labeled and sent to pathology for further analysis. The needle was removed.  COMPARISON: NONE  FINDINGS: The patient tolerated the procedure well, and no immediate complications occurred.       Impression: Successful fluoroscopic guided lumbar puncture as above with no immediate complications.    This report was finalized on 9/12/2022 4:20 PM by Ceasar Nicole.        Results for orders placed during the hospital encounter of 09/09/22    Adult Transthoracic Echo Complete W/ Cont if Necessary Per Protocol (With Agitated Saline)    Interpretation Summary  · Left ventricular systolic function is hyperdynamic (EF > 70%).  · Left ventricular diastolic function is consistent with (grade I) impaired relaxation.  · The cardiac valves are anatomically and functionally normal.  · Nondiagnostic bubble study      I have reviewed the medications:  Scheduled Meds:hold, 1 each, Does not apply, BID  acyclovir, 10 mg/kg (Ideal), Intravenous, Q8H  amLODIPine, 5 mg, Oral, Q24H  ampicillin, 2 g, Intravenous, Q4H  aspirin, 325 mg, Oral, Daily   Or  aspirin, 300 mg, Rectal, Daily  atorvastatin, 80 mg, Oral, Nightly  cefTRIAXone, 2 g, Intravenous, Q12H  cholecalciferol, 2,000 Units, Oral, Daily  fluconazole, 400 mg, Oral, Q24H  metoprolol tartrate, 50 mg, Oral, Q12H  multivitamin with minerals, 1 tablet, Oral, Daily  predniSONE, 5 mg, Oral, Daily  sodium chloride, 10 mL, Intravenous, Q12H      Continuous Infusions:Pharmacy to dose vancomycin,       PRN Meds:.acetaminophen  •  artificial tears  •  fluticasone  •  LORazepam  •  magnesium sulfate **OR** magnesium sulfate **OR** magnesium sulfate  •  ondansetron  •  Pharmacy to dose vancomycin  •  potassium chloride **OR** potassium chloride **OR** potassium chloride  •  potassium phosphate infusion greater than 15 mMoles **OR** potassium phosphate infusion greater than 15 mMoles **OR** potassium phosphate **OR** sodium phosphate IVPB  **OR** sodium phosphate IVPB **OR** sodium phosphate IVPB  •  sodium chloride  •  sodium chloride    Assessment & Plan   Assessment & Plan     Active Hospital Problems    Diagnosis  POA   • AMS (altered mental status) [R41.82]  Yes   • Hypokalemia [E87.6]  Unknown   • Benign hypertension [I10]  Yes   • Hypercholesterolemia [E78.00]  Yes   • Rheumatoid arthritis (HCC) [M06.9]  Yes      Resolved Hospital Problems   No resolved problems to display.        Brief Hospital Course to date:  Vane Villavicencio is a 77 y.o. female w hx of HTN, HLD, SVT, RA who is presenting with altered mental status. Code stroke initiated in ED    Altered mental status  TIA vs CVA vs infectious encephalopathy  - CT of head, CT perfusion, CTA of head and neck show no significant stenosis or perfusion abnormalities, no acute intracranial findings  - MRI brain without contrast shows no acute infarct, shows chronic small vessel ischemic disease  - Consider EEG, defer to neuro  - UA negative  - UDS positive for benzo's and opiates: Vinny verified  -echo shows hyperdynamic systolic function. Diastolic dysfunction, grade I  - Hold sedating medications  - Fall precautions  - Up with assistance  --ID consulted given concern of infection in underlying chronic immunosuppression-   --check resp panel pcr to look for possible viral etiology--neg  -ESR, CRP neg  -LP results w significant leukocytosis, lymphocytic predominant. D/w Dr Tyson. Broadened abx coverage to include vanc, ampicillin, acyclovir and fluconazole. Cultures pending      ADDENDUM:CSF positive for varicella. D/w DR Tyson. Plan to continue just acyclovir. D/c other abx. Airborne isolation      Low back pain  --recent steroid injections  --MRI showing cauda equina compression at nerve roots, neurosurgery said no acute intervention at this time.  They would happy to see her outpatient.    --consider consult inpatient if no improvement  --will trial voiding trial but given spinal  stenosis may need sorenson anchored long term     hypokalemia  - Replace per protocol       essential hypertension  - BP on arrival 141/109  - home amlodipine and metoprolol reordered     hyperlipidemia  - FLP shows LDL above goal  - High-dose statin      rheumatoid arthritis  - Started kevzara injections 2 weeks ago previously on orencia, received second injection prior to admission  - On a prednisone and Arava (cont prednisone, hold arava until infection ruled out)  - Follows with rheumatology at [facility]    Expected Discharge Location and Transportation: home  Expected Discharge Date: 9/16    DVT prophylaxis:  Mechanical DVT prophylaxis orders are present.     AM-PAC 6 Clicks Score (PT): 12 (09/12/22 9560)    CODE STATUS:   Code Status and Medical Interventions:   Ordered at: 09/09/22 7796     Level Of Support Discussed With:    Patient     Code Status (Patient has no pulse and is not breathing):    CPR (Attempt to Resuscitate)     Medical Interventions (Patient has pulse or is breathing):    Full Support       Shannen Marks MD  09/12/22

## 2022-09-12 NOTE — CONSULTS
Chart review for diabetes educator consult.    At the time of this review patient A1c is 5.9 , they have no noted history of diabetes and no home medications noted for treatment of diabetes. At this time we do not feel the patient would benefit from diabetes education. Thank you for this consult, should patient needs change please re consult us.

## 2022-09-12 NOTE — POST-PROCEDURE NOTE
Radiology Procedure    Pre-procedure: informed consent obtained from the patient's family member     Procedure Performed: lumbar puncture     IV Sedation and/or Anesthesia:  No    Complications: none    Preliminary Findings: pending    Specimen Removed: 8cc xanthochromic CSF    Estimated Blood Loss:  0ml    Post-Procedure Diagnosis: pending    Post-Procedure Plan: encourage fluids, bed rest x 2 hours    Standard Discharge Instructions Given:yes     SHAHNAZ Quintero  09/12/22  13:20 EDT

## 2022-09-12 NOTE — PROGRESS NOTES
"[facility] Progress Note    Date of Admission: 2022      Antibiotics:  Ceftriaxone 2 g IV every 12 hours    CC:   Chief Complaint   Patient presents with   • Stroke   Confusion    S: Patient still has some generalized weakness and lethargy.  She has been working with physical therapy and reports getting up in the bedside chair yesterday.  Confusion has improved.  Not complaining of any headaches or neck pain today.  No neck stiffness.  She is awaiting lumbar puncture today.  No nausea or diarrhea reported.  T-max was 99.2 °F.  White blood cell count remains stable at 7.04.  Blood cultures are no growth to date.    O:  /72 (BP Location: Left arm, Patient Position: Lying)   Pulse 78   Temp 98.9 °F (37.2 °C) (Oral)   Resp 16   Ht 162 cm (63.78\")   Wt 63.1 kg (139 lb 3.2 oz)   SpO2 94%   BMI 24.06 kg/m²   Temp (24hrs), Av.8 °F (37.1 °C), Min:97.9 °F (36.6 °C), Max:99.2 °F (37.3 °C)      PE:   GEN: Awake and alert.  No acute distress.  Does appear frail.  In bed.  HEENT: NCAT.  No external oral lesions noted.  No conjunctival hemorrhages.  No scleral icterus.  No oral lesions noted.  NECK: Supple without nuchal rigidity  HEART: RRR; No murmur, rubs, gallops.   LUNGS: CTA B.. Normal respiratory effort.  ABDOMEN: Soft, nontender, nondistended. No rebound or guarding.   EXT:  No cyanosis, clubbing or edema  MSK: No joint effusions noted.  SKIN: No generalized rashes noted.  No skin lesions noted.  No peripheral stigmata of infective endocarditis noted.  NEURO: Oriented to PPT.  Some chronic right leg weakness.     Laboratory Data    Results from last 7 days   Lab Units 22  0651 22  0540 09/10/22  0437   WBC 10*3/mm3 7.04 6.17 5.91   HEMOGLOBIN g/dL 11.8* 11.4* 11.9*   HEMATOCRIT % 35.3 34.2 34.9   PLATELETS 10*3/mm3 160 165 151     Results from last 7 days   Lab Units 22  0748 22  0651   SODIUM mmol/L 132* 129*   POTASSIUM mmol/L  --  3.7   CHLORIDE mmol/L  --  95*   CO2 mmol/L  --  " 21.0*   BUN mg/dL  --  15   CREATININE mg/dL  --  0.60   GLUCOSE mg/dL  --  82   CALCIUM mg/dL  --  8.5*     Results from last 7 days   Lab Units 09/10/22  0437   ALK PHOS U/L 35*   BILIRUBIN mg/dL 0.6   ALT (SGPT) U/L 25   AST (SGOT) U/L 39*     Results from last 7 days   Lab Units 09/11/22  0540   SED RATE mm/hr 10     Results from last 7 days   Lab Units 09/11/22  0540   CRP mg/dL <0.30       Estimated Creatinine Clearance: 80.7 mL/min (by C-G formula based on SCr of 0.6 mg/dL).      Microbiology:  Blood cultures negative respiratory viral panel negative      Radiology:  Imaging Results (Last 24 Hours)     ** No results found for the last 24 hours. **             PROBLEM LIST:   - Encephalopathy, acute.  Possible drug adverse event or recent epidural injections.  -Elevated lymphocytes on CBC  on new medications IL-6 inhibitor Kevzara, recently received second dose  - Chronic back pain, recent epdiural injections.  MRI not indicative of active infection.  - Lactic acidosis, improved  - Hyponatremia  - Hypokalemia  - Rheumatoid arthritis/on Arava, prednisone.  Start Kevzara 2 weeks ago with 2nd injection prior to admission. Was on orenzia.  - Immunocompromised host  - Essential hypertension  - H/o supraventricular tachycardia.     ASSESSMENT: 76 yo female with h/o RA/on Arava, prednisone, and recently started Kevzara injections 2 weeks prior with 2nd dose prior to admission, HTN, and SVT who was admitted for altered mental status.  She was lucent after arrival.  A CVA work up was negative.  Her labs were significant for neutropenia which may be a adverse reaction to Kevzara. Her UA is benign.  Her CXR is negative.  She recently had lumbar back epidural injections.  An MRI of her back does not shown any active infection, but shows chronic changes.  She is currently off Arava and Kevzara and recently got her second dose had acute somnolence and weakness requiring admission stroke work-up was negative.  She is  currently on Rocephin. She was given doses of Acyclovir and ampicillin.    She remains stable on ceftriaxone monotherapy and is currently without any meningismus.    Stable with normal white blood cell count no fevers and normal sed rate and CRP but she is immunocompromise host but doubt acute or subacute meningitis however lumbar puncture has been ordered by neurology but still has not been done currently stable off vancomycin acyclovir and ampicillin only on ceftriaxone.  No sign of injection related spine infection with normal sed rate CRP MRI negative and blood cultures negative        PLAN/RECOMMENDATIONS:     Continue ceftriaxone for now  Follow-up cultures  Lumbar puncture ordered by neurology-pending  With history of RA on Arava then Kevzara we will get some opportunistic action work-up with serum cryptococcal antigen, histo and blasto urinary antigens, Fungitell.  Currently stable without antifungal therapy     I discussed in length with the patient and her family at bedside today.  Her sister and daughter were at bedside today.    Addendum:  I discussed CSF findings with Dr. Marks. She has a significant CSF pleocytosis with lymphocytic predominance and it does not appear to be a traumatic tap. Additionally has xanthochromia. Meningitis/encephalitis panel pending and CSF cultures pending. CSF protein elevated but glucose was normal. May need to add on cytology/flow cytometry depending on meningitis/encephalitis panel and if we have enough left over CSF from LP today. Will empirically broaden coverage to IV ampicillin, IV Vancomycin, continue IV ceftriaxone, IV acyclovir, and fluconazole 400 mg PO Daily for now. I will follow up her CSF studies and check on her again tomorrow. Plan has been communicated with Dr. 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 Will put the patient in airborne precautions for now given  IC status and disseminated VZV infection. Will narrow coverage to IV Acyclovir to target VZV as I have low suspicion for other pathogens at this time and the rest of meningitis/encephalitis panel was negative. I discussed with Dr. Marks. I have also discussed her case with Dr. Morin today.    9/12/2022

## 2022-09-12 NOTE — SIGNIFICANT NOTE
09/12/22 1606   SLP Deferred Reason   SLP Deferred Reason Routine  (Attempted to see pt x3 this date; receiving other care or w/ other providers. Will defer and f/u as pt is available to participate)

## 2022-09-12 NOTE — PROGRESS NOTES
Pharmacy Consult-Vancomycin Dosing  Vane Villavicencio is a  77 y.o. female receiving vancomycin therapy.     Indication: CNS infection  Consulting Provider: Hospitalist (Dr. Marks)  ID Consult: ANTOINE Tyson    Goal AUC: 400 - 600 mg/L*hr    Current Antimicrobial Therapy  Anti-Infectives (From admission, onward)      Ordered     Dose/Rate Route Frequency Start Stop    09/12/22 1725  vancomycin 1250 mg/250 mL 0.9% NS IVPB ([facility])        Ordering Provider: Shannen Marks MD    20 mg/kg × 63.1 kg  over 90 Minutes Intravenous Once 09/12/22 1815      09/12/22 1711  acyclovir (ZOVIRAX) 540 mg in sodium chloride 0.9 % 100 mL IVPB        Ordering Provider: Shannen Marks MD    10 mg/kg × 54.2 kg (Ideal)  over 60 Minutes Intravenous Every 8 Hours 09/12/22 1800 09/19/22 1759    09/12/22 1711  fluconazole (DIFLUCAN) tablet 400 mg        Ordering Provider: Shannen Marks MD    400 mg Oral Every 24 Hours 09/12/22 1800 09/19/22 1759    09/12/22 1711  ampicillin 2 g/100 mL 0.9% NS ([facility])        Ordering Provider: Shannen Marks MD    2 g  over 30 Minutes Intravenous Every 4 Hours 09/12/22 1800 09/19/22 1759    09/12/22 1711  Pharmacy to dose vancomycin        Ordering Provider: Shannen Marks MD     Does not apply Continuous PRN 09/12/22 1704 09/19/22 1703    09/10/22 0318  ampicillin 2 g/100 mL 0.9% NS ([facility])        Ordering Provider: Chapincito Ramirez DO    2 g  over 30 Minutes Intravenous Once 09/10/22 0415 09/10/22 0433    09/10/22 0218  vancomycin 1250 mg/250 mL 0.9% NS IVPB ([facility])        Ordering Provider: Pankaj Roblero RPH    20 mg/kg × 63.7 kg  over 90 Minutes Intravenous Once 09/10/22 0330 09/10/22 0609    09/10/22 0211  cefTRIAXone (ROCEPHIN) 2 g/100 mL 0.9% NS IVPB ([facility])        Ordering Provider: Chapincito Ramirez DO    2 g  over 30 Minutes Intravenous Every 12 Hours Scheduled 09/10/22 0245 09/15/22 0559    09/10/22 0152  acyclovir (ZOVIRAX) 550 mg in sodium chloride 0.9 % 100 mL IVPB        Ordering Provider: Chapincito Ramirez DO    10  "mg/kg × 54.7 kg (Ideal)  over 60 Minutes Intravenous Every 8 Hours Scheduled 09/10/22 0230 09/10/22 0540          Allergies  Allergies as of 09/09/2022    (No Known Allergies)     Labs  Results from last 7 days   Lab Units 09/12/22  0651 09/11/22  0540 09/10/22  0437   BUN mg/dL 15 10 11   CREATININE mg/dL 0.60 0.56* 0.74     Results from last 7 days   Lab Units 09/12/22  0651 09/11/22  0540 09/10/22  0437   WBC 10*3/mm3 7.04 6.17 5.91     Evaluation of Dosing     Last Dose Received in the ED/Outside Facility: Vancomycin 1250mg (~20mg/kg) IV on 9/10 @ 0439  Is Patient on Dialysis or Renal Replacement: No    Ht - 162 cm (63.78\")  Wt - 63.1 kg (139 lb 3.2 oz)    Estimated Creatinine Clearance: 80.7 mL/min (by C-G formula based on SCr of 0.6 mg/dL).    Intake & Output (last 3 days)         09/09 0701  09/10 0700 09/10 0701 09/11 0700 09/11 0701  09/12 0700 09/12 0701 09/13 0700    P.O.   0     IV Piggyback 100 200      Total Intake(mL/kg) 100 (1.6) 200 (3.2) 0 (0)     Urine (mL/kg/hr) 200 2725 (1.8) 900 (0.6)     Emesis/NG output  0      Stool  0      Total Output 200 2725 900     Net -100 -2525 -900             Stool Unmeasured Occurrence  1 x      Emesis Unmeasured Occurrence  1 x            Microbiology and Radiology  Microbiology Results (last 10 days)       Procedure Component Value - Date/Time    Meningitis / Encephalitis Panel, PCR - Cerebrospinal Fluid, Lumbar Puncture [[id number]]  (Abnormal) Collected: 09/12/22 1327    Lab Status: Final result Specimen: Cerebrospinal Fluid from Lumbar Puncture Updated: 09/12/22 0184     ESCHERICHIA COLI K1, PCR Not Detected     HAEMOPHILUS INFLUENZAE, PCR Not Detected     LISTERIA MONOCYTOGENES, PCR Not Detected     NEISSERIA MENINGITIDIS, PCR Not Detected     STREPTOCOCCUS AGALACTIAE, PCR Not Detected     STREPTOCOCCUS PNEUMONIAE, PCR Not Detected     CYTOMEGALOVIRUS (CMV), PCR Not Detected     ENTEROVIRUS, PCR Not Detected     HERPES SIMPLEX VIRUS 1 (HSV-1), PCR Not " Detected     HERPES SIMPLEX VIRUS 2 (HSV-2), PCR Not Detected     HUMAN PARECHOVIRUS, PCR Not Detected     VARICELLA ZOSTER VIRUS (VZV), PCR Detected     CRYPTOCOCCUS NEOFORMANS / GATTII, PCR Not Detected     HUMAN HERPES VIRUS 6 PCR Not Detected    Respiratory Panel PCR w/COVID-19(SARS-CoV-2) TITA/HELENA/BRANDY/PAD/COR/MAD/RISA In-House, NP Swab in UTM/VTM, 3-4 HR TAT - Swab, Nasopharynx [[id number]]  (Normal) Collected: 09/10/22 0506    Lab Status: Final result Specimen: Swab from Nasopharynx Updated: 09/10/22 0564     ADENOVIRUS, PCR Not Detected     Coronavirus 229E Not Detected     Coronavirus HKU1 Not Detected     Coronavirus NL63 Not Detected     Coronavirus OC43 Not Detected     COVID19 Not Detected     Human Metapneumovirus Not Detected     Human Rhinovirus/Enterovirus Not Detected     Influenza A PCR Not Detected     Influenza B PCR Not Detected     Parainfluenza Virus 1 Not Detected     Parainfluenza Virus 2 Not Detected     Parainfluenza Virus 3 Not Detected     Parainfluenza Virus 4 Not Detected     RSV, PCR Not Detected     Bordetella pertussis pcr Not Detected     Bordetella parapertussis PCR Not Detected     Chlamydophila pneumoniae PCR Not Detected     Mycoplasma pneumo by PCR Not Detected    Narrative:      In the setting of a positive respiratory panel with a viral infection PLUS a negative procalcitonin without other underlying concern for bacterial infection, consider observing off antibiotics or discontinuation of antibiotics and continue supportive care. If the respiratory panel is positive for atypical bacterial infection (Bordetella pertussis, Chlamydophila pneumoniae, or Mycoplasma pneumoniae), consider antibiotic de-escalation to target atypical bacterial infection.    Blood Culture - Blood, Hand, Right [[id number]]  (Normal) Collected: 09/10/22 0437    Lab Status: Preliminary result Specimen: Blood from Hand, Right Updated: 09/12/22 0731     Blood Culture No growth at 2 days    Blood Culture -  Blood, Arm, Left [[id number]]  (Normal) Collected: 09/10/22 0437    Lab Status: Preliminary result Specimen: Blood from Arm, Left Updated: 09/12/22 0731     Blood Culture No growth at 2 days    COVID PRE-OP / PRE-PROCEDURE SCREENING ORDER (NO ISOLATION) - Swab, Nasopharynx [[id number]]  (Normal) Collected: 09/09/22 2115    Lab Status: Final result Specimen: Swab from Nasopharynx Updated: 09/09/22 2146    Narrative:      The following orders were created for panel order COVID PRE-OP / PRE-PROCEDURE SCREENING ORDER (NO ISOLATION) - Swab, Nasopharynx.  Procedure                               Abnormality         Status                     ---------                               -----------         ------                     COVID-19 and FLU A/B PCR...[[id number]]  Normal              Final result                 Please view results for these tests on the individual orders.    COVID-19 and FLU A/B PCR - Swab, Nasopharynx [[id number]]  (Normal) Collected: 09/09/22 2115    Lab Status: Final result Specimen: Swab from Nasopharynx Updated: 09/09/22 2146     COVID19 Not Detected     Influenza A PCR Not Detected     Influenza B PCR Not Detected    Narrative:      Fact sheet for providers: https://www.fda.gov/media/454921/download    Fact sheet for patients: https://www.fda.gov/media/789182/download    Test performed by PCR.          Reported Vancomycin Levels                 [employer]ight[employer] AUC Calculation:    Current AUC: -- mg/L*hr    Predicted Steady State AUC on Current Dose: -- mg/L*hr  _________________________________    Predicted Steady State AUC on New Dose:  444 mg/L*hr    Assessment/Plan:  Pharmacy to dose vancomycin for CNS infection. Goal -600 mg/L*hr.  Patient received loading dose of vancomycin 1250mg (~20mg/kg) IV on 9/10 @ 0439, but has not received a dose since. Will reload patient with repeat 1250mg dose on 9/12 @ 1800 then initiate maintenance dose of vancomycin 750mg (~12mg/kg) IV Q12hr on 9/13 @  0800.  Assess clearance by trough level on 9/14 @ 0600, prior to 4th dose.  Pharmacy will continue to monitor renal function, cultures and sensitivities, and clinical status to adjust regimen as necessary.    Taylor Riedel, PharmD, [facility]  9/12/2022  17:27 EDT

## 2022-09-12 NOTE — PROGRESS NOTES
"[facility] Progress Note    Date of Admission: 2022      Antibiotics:  Ceftriaxone 2 g IV every 12 hours    CC:   Chief Complaint   Patient presents with   • Stroke   Confusion    S: Patient with some lethargy but stable today with no fevers reports no headache or neck pain no significant lower back pain has some chronic right leg weakness.    O:  /78 (BP Location: Left arm, Patient Position: Lying)   Pulse 92   Temp 99 °F (37.2 °C) (Oral)   Resp 20   Ht 162 cm (63.78\")   Wt 63 kg (138 lb 14.2 oz)   SpO2 96%   BMI 24.01 kg/m²   Temp (24hrs), Av.4 °F (36.9 °C), Min:97.9 °F (36.6 °C), Max:99 °F (37.2 °C)      PE:   GEN: Awake and alert, in no acute distress.  Chronically ill-appearing with some lethargy  HEENT: NCAT.  EOMI. No conjunctival injection. No icterus. Oropharynx clear without evidence of thrush or exudate.   NECK: Supple without nuchal rigidity  HEART: RRR; No murmur, rubs, gallops.   LUNGS: Clear to auscultation bilaterally without wheezing, rales, rhonchi. Normal respiratory effort.  ABDOMEN: Soft, nontender, nondistended. Positive bowel sounds. No rebound or guarding.   EXT:  No cyanosis, clubbing or edema  : Normal appearing genitalia without Ortiz catheter.  MSK: Decreased range of motion of lumbar spine  SKIN: Warm and dry without cutaneous eruptions.    NEURO: Oriented to PPT.  Some chronic right leg weakness  Laboratory Data    Results from last 7 days   Lab Units 22  0540 09/10/22  0437 22  1818 22  1817   WBC 10*3/mm3 6.17 5.91  --  7.11   HEMOGLOBIN g/dL 11.4* 11.9*  --  13.6   HEMOGLOBIN, POC g/dL  --   --  15.0  --    HEMATOCRIT % 34.2 34.9  --  40.0   HEMATOCRIT POC %  --   --  44  --    PLATELETS 10*3/mm3 165 151  --  163     Results from last 7 days   Lab Units 22  1857 22  1038 22  0540   SODIUM mmol/L 132*   < > 134*  135*   POTASSIUM mmol/L  --   --  3.7  3.7   CHLORIDE mmol/L  --   --  99   CO2 mmol/L  --   --  23.0   BUN mg/dL  --   " --  10   CREATININE mg/dL  --   --  0.56*   GLUCOSE mg/dL  --   --  93   CALCIUM mg/dL  --   --  8.8    < > = values in this interval not displayed.     Results from last 7 days   Lab Units 09/10/22  0437   ALK PHOS U/L 35*   BILIRUBIN mg/dL 0.6   ALT (SGPT) U/L 25   AST (SGOT) U/L 39*     Results from last 7 days   Lab Units 09/11/22  0540   SED RATE mm/hr 10     Results from last 7 days   Lab Units 09/11/22  0540   CRP mg/dL <0.30       Estimated Creatinine Clearance: 86.3 mL/min (A) (by C-G formula based on SCr of 0.56 mg/dL (L)).      Microbiology:  Blood cultures negative respiratory viral panel negative      Radiology:  Imaging Results (Last 24 Hours)     ** No results found for the last 24 hours. **             PROBLEM LIST:   - Encephalopathy, acute.  Possible drug adverse event or recent epidural injections.  -Elevated lymphocytes on CBC  on new medications IL-6 inhibitor Kevzara, recently received second dose  - Chronic back pain, recent epdiural injections.  MRI not indicative of active infection.  - Lactic acidosis, improved  - Hyponatremia  - Hypokalemia  - Rheumatoid arthritis/on Arava, prednisone.  Start Kevzara 2 weeks ago with 2nd injection prior to admission. Was on orenzia.  - Immunocompromised host  - Essential hypertension  - H/o supraventricular tachycardia.     ASSESSMENT: 78 yo female with h/o RA/on Arava, prednisone, and recently started Kevzara injections 2 weeks prior with 2nd dose prior to admission, HTN, and SVT who was admitted for altered mental status.  She was lucent after arrival.  A CVA work up was negative.  Her labs were significant for neutropenia which may be a adverse reaction to Kevzara. Her UA is benign.  Her CXR is negative.  She recently had lumbar back epidural injections.  An MRI of her back does not shown any active infection, but shows chronic changes.  She is currently off Arava and Kevzara and recently got her second dose had acute somnolence and weakness requiring  admission stroke work-up was negative.  She is currently on Rocephin a.  She was given doses of Acyclovir and ampicillin.  Will change to Rocephin monotherapy as appropriate today without headache or neck stiffness see no clinical signs of spinal infection or meningitis at this time    Stable with normal white blood cell count no fevers and normal sed rate and CRP but she is immunocompromise host but doubt acute or subacute meningitis however lumbar puncture has been ordered by neurology but still has not been done currently stable off vancomycin acyclovir and ampicillin only on ceftriaxone.  No sign of injection related spine infection with normal sed rate CRP MRI negative and blood cultures negative        PLAN/RECOMMENDATIONS:     Continue ceftriaxone for now  Follow-up cultures  Lumbar puncture ordered by neurology  With history of RA on Arava then Kevzara we will get some opportunistic action work-up with serum cryptococcal antigen, histo and blasto urinary antigens, Fungitell.  Currently stable without antifungal therapy       Les Morin MD  9/11/2022

## 2022-09-12 NOTE — PROGRESS NOTES
Neurology       Patient Care Team:  Rolando Jeronimo MD as PCP - General (Internal Medicine)    Chief complaint: Altered mental status    History: Patient is improved dramatically.  GBA confused following infusion of Kevzara    Has had a lumbar puncture with results were not available.      Past Medical History:   Diagnosis Date   • Asthma    • Benign hypertension    • Disease of thyroid gland    • Family history of heart disease    • Family history of heart disease    • Hypercholesterolemia    • Rheumatoid arthritis (HCC)    • Rheumatoid arthritis (HCC)    • Supraventricular tachycardia (HCC)     first diagnosed 06/01/2012   • Supraventricular tachycardia (HCC)        Vital Signs   Vitals:    09/12/22 1100 09/12/22 1118 09/12/22 1200 09/12/22 1418   BP: 131/72 131/72  135/67   BP Location: Left arm Left arm  Left arm   Patient Position: Lying Lying  Lying   Pulse:  78 80 80   Resp: 16 16  16   Temp:  98.9 °F (37.2 °C)  98.7 °F (37.1 °C)   TempSrc:  Oral  Oral   SpO2:  94% 96% 96%   Weight:       Height:           Physical Exam:   General: Awake and alert              Neuro: Oriented to person place and time.    Speech is normal and she is able to give a reasonable account of her history of rheumatoid disease and spine problem    Results Review:  Reviewed  Results from last 7 days   Lab Units 09/12/22  0651   WBC 10*3/mm3 7.04   HEMOGLOBIN g/dL 11.8*   HEMATOCRIT % 35.3   PLATELETS 10*3/mm3 160     Results from last 7 days   Lab Units 09/12/22  1500 09/12/22  0748 09/12/22  0651 09/11/22  1038 09/11/22  0540 09/11/22  0017 09/10/22  1425 09/10/22  0437 09/09/22  1818 09/09/22  1817   SODIUM mmol/L 133* 132* 129*   < > 134*  135*  --    < > 133*  133*  --  133*   POTASSIUM mmol/L  --   --  3.7  --  3.7  3.7 4.2  --  2.6*  --  3.1*   CHLORIDE mmol/L  --   --  95*  --  99  --   --  90*  --  87*   CO2 mmol/L  --   --  21.0*  --  23.0  --   --  29.0  --  25.0   BUN mg/dL  --   --  15  --  10  --   --  11  --  11    CREATININE mg/dL  --   --  0.60  --  0.56*  --   --  0.74   < > 0.84   CALCIUM mg/dL  --   --  8.5*  --  8.8  --   --  9.3  --  10.2   BILIRUBIN mg/dL  --   --   --   --   --   --   --  0.6  --   --    ALK PHOS U/L  --   --   --   --   --   --   --  35*  --   --    ALT (SGPT) U/L  --   --   --   --   --   --   --  25  --  34*   AST (SGOT) U/L  --   --   --   --   --   --   --  39*  --  43*   GLUCOSE mg/dL  --   --  82  --  93  --   --  106*  --  104*    < > = values in this interval not displayed.       Imaging Results (Last 24 Hours)     Procedure Component Value Units Date/Time    IR LUMBAR PUNCTURE DIAGNOSTIC [[id number]] Resulted: 09/12/22 1328     Updated: 09/12/22 1554          Assessment:  Transient altered mental status,?  Effects of immune therapy versus granulomatous meningitis    Plan:  LP is benign patient can be discharged in the morning.    Comment:  Doing well         I discussed the patients findings and my recommendations with patient and family    Josse Gray MD  09/12/22  16:07 EDT

## 2022-09-13 PROBLEM — D84.9 IMMUNOCOMPROMISED (HCC): Chronic | Status: ACTIVE | Noted: 2022-09-13

## 2022-09-13 PROBLEM — G93.41 METABOLIC ENCEPHALOPATHY: Status: ACTIVE | Noted: 2022-09-09

## 2022-09-13 PROBLEM — B01.11 VARICELLA ENCEPHALITIS: Status: ACTIVE | Noted: 2022-09-13

## 2022-09-13 LAB
ANION GAP SERPL CALCULATED.3IONS-SCNC: 12 MMOL/L (ref 5–15)
BASOPHILS # BLD AUTO: 0.1 10*3/MM3 (ref 0–0.2)
BASOPHILS # BLD AUTO: 0.1 X10E3/UL (ref 0–0.2)
BASOPHILS NFR BLD AUTO: 1.4 % (ref 0–1.5)
BASOPHILS NFR BLD AUTO: 2 %
BUN SERPL-MCNC: 24 MG/DL (ref 8–23)
BUN/CREAT SERPL: 37.5 (ref 7–25)
CALCIUM SPEC-SCNC: 8.3 MG/DL (ref 8.6–10.5)
CD3+CD4+ CELLS # BLD: 1919 /UL (ref 359–1519)
CD3+CD4+ CELLS NFR BLD: 53.3 % (ref 30.8–58.5)
CHLORIDE SERPL-SCNC: 100 MMOL/L (ref 98–107)
CO2 SERPL-SCNC: 22 MMOL/L (ref 22–29)
CREAT SERPL-MCNC: 0.64 MG/DL (ref 0.57–1)
CRYPTOC AG CSF QL LA: NEGATIVE
DEPRECATED RDW RBC AUTO: 46.9 FL (ref 37–54)
EGFRCR SERPLBLD CKD-EPI 2021: 92.3 ML/MIN/1.73
EOSINOPHIL # BLD AUTO: 0.2 X10E3/UL (ref 0–0.4)
EOSINOPHIL # BLD AUTO: 0.25 10*3/MM3 (ref 0–0.4)
EOSINOPHIL NFR BLD AUTO: 3 %
EOSINOPHIL NFR BLD AUTO: 3.4 % (ref 0.3–6.2)
ERYTHROCYTE [DISTWIDTH] IN BLOOD BY AUTOMATED COUNT: 14 % (ref 11.7–15.4)
ERYTHROCYTE [DISTWIDTH] IN BLOOD BY AUTOMATED COUNT: 14.6 % (ref 12.3–15.4)
GLUCOSE SERPL-MCNC: 86 MG/DL (ref 65–99)
HCT VFR BLD AUTO: 35.7 % (ref 34–46.6)
HCT VFR BLD AUTO: 36.7 % (ref 34–46.6)
HGB BLD-MCNC: 11.6 G/DL (ref 12–15.9)
HGB BLD-MCNC: 11.8 G/DL (ref 11.1–15.9)
IMM GRANULOCYTES # BLD AUTO: 0 X10E3/UL (ref 0–0.1)
IMM GRANULOCYTES # BLD AUTO: 0.02 10*3/MM3 (ref 0–0.05)
IMM GRANULOCYTES NFR BLD AUTO: 0 %
IMM GRANULOCYTES NFR BLD AUTO: 0.3 % (ref 0–0.5)
LYMPHOCYTES # BLD AUTO: 3.6 X10E3/UL (ref 0.7–3.1)
LYMPHOCYTES # BLD AUTO: 3.7 10*3/MM3 (ref 0.7–3.1)
LYMPHOCYTES NFR BLD AUTO: 50.5 % (ref 19.6–45.3)
LYMPHOCYTES NFR BLD AUTO: 53 %
MCH RBC QN AUTO: 27.8 PG (ref 26.6–33)
MCH RBC QN AUTO: 28.4 PG (ref 26.6–33)
MCHC RBC AUTO-ENTMCNC: 32.2 G/DL (ref 31.5–35.7)
MCHC RBC AUTO-ENTMCNC: 32.5 G/DL (ref 31.5–35.7)
MCV RBC AUTO: 87 FL (ref 79–97)
MCV RBC AUTO: 87.5 FL (ref 79–97)
MONOCYTES # BLD AUTO: 1.1 X10E3/UL (ref 0.1–0.9)
MONOCYTES # BLD AUTO: 1.36 10*3/MM3 (ref 0.1–0.9)
MONOCYTES NFR BLD AUTO: 16 %
MONOCYTES NFR BLD AUTO: 18.6 % (ref 5–12)
NEUTROPHILS # BLD AUTO: 1.7 X10E3/UL (ref 1.4–7)
NEUTROPHILS NFR BLD AUTO: 1.9 10*3/MM3 (ref 1.7–7)
NEUTROPHILS NFR BLD AUTO: 25.8 % (ref 42.7–76)
NEUTROPHILS NFR BLD AUTO: 26 %
NRBC BLD AUTO-RTO: 0 /100 WBC (ref 0–0.2)
PLATELET # BLD AUTO: 158 10*3/MM3 (ref 140–450)
PLATELET # BLD AUTO: 160 X10E3/UL (ref 150–450)
PMV BLD AUTO: 11.5 FL (ref 6–12)
POTASSIUM SERPL-SCNC: 3.7 MMOL/L (ref 3.5–5.2)
RBC # BLD AUTO: 4.08 10*6/MM3 (ref 3.77–5.28)
RBC # BLD AUTO: 4.24 X10E6/UL (ref 3.77–5.28)
S PNEUM AG SPEC QL LA: NEGATIVE
SODIUM SERPL-SCNC: 132 MMOL/L (ref 136–145)
SODIUM SERPL-SCNC: 134 MMOL/L (ref 136–145)
WBC # BLD AUTO: 6.7 X10E3/UL (ref 3.4–10.8)
WBC NRBC COR # BLD: 7.33 10*3/MM3 (ref 3.4–10.8)

## 2022-09-13 PROCEDURE — 99232 SBSQ HOSP IP/OBS MODERATE 35: CPT | Performed by: PSYCHIATRY & NEUROLOGY

## 2022-09-13 PROCEDURE — 99233 SBSQ HOSP IP/OBS HIGH 50: CPT | Performed by: INTERNAL MEDICINE

## 2022-09-13 PROCEDURE — 85025 COMPLETE CBC W/AUTO DIFF WBC: CPT | Performed by: STUDENT IN AN ORGANIZED HEALTH CARE EDUCATION/TRAINING PROGRAM

## 2022-09-13 PROCEDURE — 25010000002 ONDANSETRON PER 1 MG: Performed by: STUDENT IN AN ORGANIZED HEALTH CARE EDUCATION/TRAINING PROGRAM

## 2022-09-13 PROCEDURE — 97530 THERAPEUTIC ACTIVITIES: CPT

## 2022-09-13 PROCEDURE — 84295 ASSAY OF SERUM SODIUM: CPT | Performed by: NURSE PRACTITIONER

## 2022-09-13 PROCEDURE — 25010000002 ACYCLOVIR PER 5 MG: Performed by: STUDENT IN AN ORGANIZED HEALTH CARE EDUCATION/TRAINING PROGRAM

## 2022-09-13 PROCEDURE — 97535 SELF CARE MNGMENT TRAINING: CPT

## 2022-09-13 PROCEDURE — 63710000001 PREDNISONE PER 5 MG: Performed by: NURSE PRACTITIONER

## 2022-09-13 PROCEDURE — 87899 AGENT NOS ASSAY W/OPTIC: CPT | Performed by: INTERNAL MEDICINE

## 2022-09-13 PROCEDURE — 80048 BASIC METABOLIC PNL TOTAL CA: CPT | Performed by: STUDENT IN AN ORGANIZED HEALTH CARE EDUCATION/TRAINING PROGRAM

## 2022-09-13 RX ADMIN — ACYCLOVIR SODIUM 620 MG: 500 INJECTION, SOLUTION INTRAVENOUS at 15:27

## 2022-09-13 RX ADMIN — ACYCLOVIR SODIUM 620 MG: 500 INJECTION, SOLUTION INTRAVENOUS at 20:00

## 2022-09-13 RX ADMIN — Medication 2000 UNITS: at 09:04

## 2022-09-13 RX ADMIN — MULTIPLE VITAMINS W/ MINERALS TAB 1 TABLET: TAB at 09:04

## 2022-09-13 RX ADMIN — ONDANSETRON 4 MG: 2 INJECTION INTRAMUSCULAR; INTRAVENOUS at 18:31

## 2022-09-13 RX ADMIN — ATORVASTATIN CALCIUM 80 MG: 40 TABLET, FILM COATED ORAL at 20:00

## 2022-09-13 RX ADMIN — METOPROLOL TARTRATE 50 MG: 50 TABLET, FILM COATED ORAL at 09:04

## 2022-09-13 RX ADMIN — METOPROLOL TARTRATE 50 MG: 50 TABLET, FILM COATED ORAL at 20:00

## 2022-09-13 RX ADMIN — Medication 10 ML: at 20:02

## 2022-09-13 RX ADMIN — AMLODIPINE BESYLATE 5 MG: 5 TABLET ORAL at 09:04

## 2022-09-13 RX ADMIN — ACYCLOVIR SODIUM 620 MG: 500 INJECTION, SOLUTION INTRAVENOUS at 05:31

## 2022-09-13 RX ADMIN — PREDNISONE 5 MG: 5 TABLET ORAL at 09:04

## 2022-09-13 RX ADMIN — ASPIRIN 325 MG ORAL TABLET 325 MG: 325 PILL ORAL at 09:04

## 2022-09-13 NOTE — PLAN OF CARE
Goal Outcome Evaluation:  Plan of Care Reviewed With: patient, family        Progress: improving  Outcome Evaluation: Pt presents w/ generalized weakness, decreased activity tolerance, and balance deficits limiting her ADL independence. Pt CGA for supine-to-sit w/ extended time and effort required, mod Ax1 for STSx2 & SPT from bed-to-chair w/ BUE support, min A for UBD, SUA for grooming. Will continue to progress pt as tolerated per OT POC. Rec IRF at d/c.

## 2022-09-13 NOTE — THERAPY TREATMENT NOTE
Patient Name: Vane Villavicencio  : 1945    MRN: [id number]                              Today's Date: 2022       Admit Date: 2022    Visit Dx:     ICD-10-CM ICD-9-CM   1. Altered mental status, unspecified altered mental status type  R41.82 780.97   2. Hypokalemia  E87.6 276.8   3. Cognitive communication deficit  R41.841 799.52     Patient Active Problem List   Diagnosis   • Supraventricular tachycardia (HCC)   • Rheumatoid arthritis (HCC)   • Hypercholesterolemia   • Benign hypertension   • Abnormal stress test   • AMS (altered mental status)   • Hypokalemia     Past Medical History:   Diagnosis Date   • Asthma    • Benign hypertension    • Disease of thyroid gland    • Family history of heart disease    • Family history of heart disease    • Hypercholesterolemia    • Rheumatoid arthritis (HCC)    • Rheumatoid arthritis (HCC)    • Supraventricular tachycardia (HCC)     first diagnosed 2012   • Supraventricular tachycardia (HCC)      Past Surgical History:   Procedure Laterality Date   • CARDIAC CATHETERIZATION N/A 7/10/2020    Procedure: Left Heart Cath;  Surgeon: Pankaj Pollard MD;  Location: [facility] CATH INVASIVE LOCATION;  Service: Cardiology;  Laterality: N/A;   • SINUS SURGERY        General Information     Row Name 22 1519          OT Time and Intention    Document Type therapy note (daily note)  -MA     Mode of Treatment occupational therapy  -MA     Row Name 22 1519          General Information    Patient Profile Reviewed yes  -MA     Existing Precautions/Restrictions fall;other (see comments)  [place]  -MA     Barriers to Rehab medically complex  -MA     Row Name 22 1519          Cognition    Orientation Status (Cognition) oriented x 3  -MA     Row Name 22 1519          Safety Issues, Functional Mobility    Safety Issues Affecting Function (Mobility) awareness of need for assistance;insight into deficits/self-awareness;safety precaution awareness;safety  precautions follow-through/compliance;sequencing abilities  -MA     Impairments Affecting Function (Mobility) balance;cognition;endurance/activity tolerance;coordination;strength;postural/trunk control  -MA     Cognitive Impairments, Mobility Safety/Performance awareness, need for assistance;insight into deficits/self-awareness;safety precaution awareness;safety precaution follow-through;sequencing abilities  -MA           User Key  (r) = Recorded By, (t) = Taken By, (c) = Cosigned By    Initials Name Provider Type    Marilynn Gonsalez OT Occupational Therapist                 Mobility/ADL's     Row Name 09/13/22 1523          Bed Mobility    Bed Mobility supine-sit  -MA     Scooting/Bridging [place] (Bed Mobility) contact guard;verbal cues  -MA     Assistive Device (Bed Mobility) bed rails;head of bed elevated  -MA     Comment, (Bed Mobility) Pt required extended time and effort but demo'd ability to perform supine-to-sit transfer w/ CGA.  -MA     Row Name 09/13/22 1523          Transfers    Transfers sit-stand transfer;stand-sit transfer;bed-chair transfer  -MA     Comment, (Transfers) Pt performed STSx2 & SPT from bed-to-chair w/ mod Ax1 & BUE support. VC's for sequencing and safe hand placement.  -MA     Bed-Chair [place] (Transfers) moderate assist (50% patient effort);1 person assist;verbal cues  -MA     Assistive Device (Bed-Chair Transfers) other (see comments)  -MA     Sit-Stand [place] (Transfers) moderate assist (50% patient effort);1 person assist;verbal cues  -MA     Stand-Sit [place] (Transfers) moderate assist (50% patient effort);1 person assist;verbal cues  -MA     Row Name 09/13/22 1523          Sit-Stand Transfer    Assistive Device (Sit-Stand Transfers) other (see comments)  -MA     Row Name 09/13/22 1523          Stand-Sit Transfer    Assistive Device (Stand-Sit Transfers) other (see comments)  -MA     Row Name 09/13/22 1523          Activities of Daily Living    BADL  Assessment/Intervention upper body dressing;grooming;lower body dressing  -MA     [facility] Name 09/13/22 1523          Grooming Assessment/Training    [place] Level (Grooming) hair care, combing/brushing;set up  -MA     Position (Grooming) supported sitting  -MA     Row Name 09/13/22 1523          Lower Body Dressing Assessment/Training    [place] Level (Lower Body Dressing) don;socks;dependent (less than 25% patient effort);verbal cues  -MA     Position (Lower Body Dressing) supine  -MA     Row Name 09/13/22 1523          Upper Body Dressing Assessment/Training    [place] Level (Upper Body Dressing) don;doff;pajama/robe;minimum assist (75% patient effort);verbal cues  -MA     Position (Upper Body Dressing) edge of bed sitting;unsupported sitting  -MA           User Key  (r) = Recorded By, (t) = Taken By, (c) = Cosigned By    Initials Name Provider Type    Marilynn Gonsalez OT Occupational Therapist               Obj/Interventions     Row Name 09/13/22 1527          Balance    Balance Assessment sitting static balance;sit to stand dynamic balance;standing static balance;standing dynamic balance  -MA     Static Sitting Balance supervision  -MA     Position, Sitting Balance unsupported  -MA     Sit to Stand Dynamic Balance moderate assist;1-person assist;verbal cues  -MA     Static Standing Balance moderate assist;1-person assist;verbal cues  -MA     Dynamic Standing Balance minimal assist;1-person assist;verbal cues  -MA     Position/Device Used, Standing Balance supported  -MA     Balance Interventions sitting;sit to stand;occupation based/functional task  -MA           User Key  (r) = Recorded By, (t) = Taken By, (c) = Cosigned By    Initials Name Provider Type    Marilynn Gonsalez OT Occupational Therapist               Goals/Plan    No documentation.                Clinical Impression     Row Name 09/13/22 1530          Pain Assessment    Pretreatment Pain Rating 0/10 - no pain  -MA      Posttreatment Pain Rating 0/10 - no pain  -MA     Row Name 09/13/22 1530          Plan of Care Review    Plan of Care Reviewed With patient;family  -MA     Progress improving  -MA     Outcome Evaluation Pt presents w/ generalized weakness, decreased activity tolerance, and balance deficits limiting her ADL independence. Pt CGA for supine-to-sit w/ extended time and effort required, mod Ax1 for STSx2 & SPT from bed-to-chair w/ BUE support, min A for UBD, SUA for grooming. Will continue to progress pt as tolerated per OT POC. Rec IRF at d/c.  -MA     Row Name 09/13/22 1530          Therapy Assessment/Plan (OT)    Rehab Potential (OT) good, to achieve stated therapy goals  -MA     Criteria for Skilled Therapeutic Interventions Met (OT) skilled treatment is necessary;yes  -MA     Therapy Frequency (OT) daily  -MA     Row Name 09/13/22 1530          Therapy Plan Review/Discharge Plan (OT)    Anticipated Discharge Disposition (OT) inpatient rehabilitation facility  -MA     Row Name 09/13/22 1530          Vital Signs    Pre Systolic BP Rehab --  VSS - RN cleared OT  -MA     O2 Delivery Pre Treatment room air  -MA     O2 Delivery Intra Treatment room air  -MA     O2 Delivery Post Treatment room air  -MA     Pre Patient Position Supine  -MA     Intra Patient Position Standing  -MA     Post Patient Position Sitting  -MA     Row Name 09/13/22 1530          Positioning and Restraints    Pre-Treatment Position in bed  -MA     Post Treatment Position chair  -MA     In Chair notified nsg;reclined;call light within reach;encouraged to call for assist;with family/caregiver;waffle cushion;legs elevated;heels elevated  -MA           User Key  (r) = Recorded By, (t) = Taken By, (c) = Cosigned By    Initials Name Provider Type    Marilynn Gonsalez, RAFAELA Occupational Therapist               Outcome Measures     Row Name 09/13/22 1533          How much help from another is currently needed...    Putting on and taking off regular lower body  clothing? 2  -MA     Bathing (including washing, rinsing, and drying) 2  -MA     Toileting (which includes using toilet bed pan or urinal) 3  -MA     Putting on and taking off regular upper body clothing 3  -MA     Taking care of personal grooming (such as brushing teeth) 3  -MA     Eating meals 3  -MA     AM-PAC 6 Clicks Score (OT) 16  -MA     Row Name 09/13/22 1533          Functional Assessment    Outcome Measure Options AM-PAC 6 Clicks Daily Activity (OT)  -MA           User Key  (r) = Recorded By, (t) = Taken By, (c) = Cosigned By    Initials Name Provider Type    Marilynn Gonsalez OT Occupational Therapist                Occupational Therapy Education                 Title: PT OT SLP Therapies (Done)     Topic: Occupational Therapy (Done)     Point: ADL training (Done)     Description:   Instruct learner(s) on proper safety adaptation and remediation techniques during self care or transfers.   Instruct in proper use of assistive devices.              Learning Progress Summary           Patient Acceptance, E, VU by MA at 9/13/2022 1533    Acceptance, E, VU,NR by TA at 9/10/2022 1157   Family Acceptance, E, VU by MA at 9/13/2022 1533                   Point: Home exercise program (Done)     Description:   Instruct learner(s) on appropriate technique for monitoring, assisting and/or progressing therapeutic exercises/activities.              Learning Progress Summary           Patient Acceptance, E, VU,NR by TA at 9/10/2022 1157                   Point: Precautions (Done)     Description:   Instruct learner(s) on prescribed precautions during self-care and functional transfers.              Learning Progress Summary           Patient Acceptance, E, VU by MA at 9/13/2022 1533    Acceptance, E, VU,NR by TA at 9/10/2022 1157   Family Acceptance, E, VU by MA at 9/13/2022 1533                   Point: Body mechanics (Done)     Description:   Instruct learner(s) on proper positioning and spine alignment during  self-care, functional mobility activities and/or exercises.              Learning Progress Summary           Patient Acceptance, E, VU by MA at 9/13/2022 1533    Acceptance, E, VU,NR by ERIC at 9/10/2022 1157   Family Acceptance, E, VU by MA at 9/13/2022 1533                               User Key     Initials Effective Dates Name Provider Type Discipline    ERIC 06/16/21 -  Jim Jj OT Occupational Therapist OT    MA 11/19/20 -  Marilynn Feldman OT Occupational Therapist OT              OT Recommendation and Plan  Therapy Frequency (OT): daily  Plan of Care Review  Plan of Care Reviewed With: patient, family  Progress: improving  Outcome Evaluation: Pt presents w/ generalized weakness, decreased activity tolerance, and balance deficits limiting her ADL independence. Pt CGA for supine-to-sit w/ extended time and effort required, mod Ax1 for STSx2 & SPT from bed-to-chair w/ BUE support, min A for UBD, SUA for grooming. Will continue to progress pt as tolerated per OT POC. Rec IRF at d/c.     Time Calculation:    Time Calculation- OT     Row Name 09/13/22 1534             Time Calculation- OT    OT Start Time 1421  -MA      OT Received On 09/13/22  -MA      OT Goal Re-Cert Due Date 09/20/22  -MA              Timed Charges    74035 - OT Therapeutic Activity Minutes 18  -MA      88472 - OT Self Care/Mgmt Minutes 6  -MA              Total Minutes    Timed Charges Total Minutes 24  -MA       Total Minutes 24  -MA            User Key  (r) = Recorded By, (t) = Taken By, (c) = Cosigned By    Initials Name Provider Type    Marilynn Gonsalez OT Occupational Therapist              Therapy Charges for Today     Code Description Service Date Service Provider Modifiers Qty    94070776326 HC OT THERAPEUTIC ACT EA 15 MIN 9/13/2022 Marilynn Feldman OT GO 1    05412553280 HC OT SELF CARE/MGMT/TRAIN EA 15 MIN 9/13/2022 Marilynn Feldman OT GO 1               Marilynn Feldman OT  9/13/2022

## 2022-09-13 NOTE — CASE MANAGEMENT/SOCIAL WORK
Discharge Planning Assessment  [facility]     Patient Name: Vane Villavicencio  MRN: [id number]  Today's Date: 9/13/2022    Admit Date: 9/9/2022     Discharge Needs Assessment     Row Name 09/13/22 1222       Living Environment    People in Home spouse;child(vonnie), adult;sibling(s)  Patient lives with her  and her son, her sister lives with them part time    Current Living Arrangements home    Primary Care Provided by self    Provides Primary Care For no one    Family Caregiver if Needed spouse;child(vonnie), adult    Quality of Family Relationships supportive    Able to Return to Prior Arrangements yes       Resource/Environmental Concerns    Resource/Environmental Concerns none    Transportation Concerns none       Transition Planning    Patient/Family Anticipates Transition to home with family    Patient/Family Anticipated Services at Transition     Transportation Anticipated family or friend will provide       Discharge Needs Assessment    Readmission Within the Last 30 Days no previous admission in last 30 days    Equipment Currently Used at Home cane, quad;walker, rolling    Concerns to be Addressed discharge planning    Anticipated Changes Related to Illness none    Equipment Needed After Discharge none               Discharge Plan     Row Name 09/13/22 1745       Plan    Plan Ongoing    Patient/Family in Agreement with Plan yes    Plan Comments Patient is in airborne isolation, attempted to call , no answer, spoke with Barb De León over the phone who states she is patients sister not her daughter.  Patient resides in [facility] and lives with her , son and her sister states she lives with them part time.  Prior to admission sister states that patient is independent with ADL's and mobility.  Patient has a cane and walker at home that she didn't use until a day or two before she was admitted to the hospital.  Patient has never had home health.  Patients PCP is listed as Rolando  Kandace and she has Medicare [employer] and [place] for her insurance.  Discharge plan is pending patients medical progress.  CM will continue to follow.              Continued Care and Services - Admitted Since 9/9/2022    Coordination has not been started for this encounter.       Expected Discharge Date and Time     Expected Discharge Date Expected Discharge Time    Sep 16, 2022          Demographic Summary     Row Name 09/13/22 1222       General Information    Admission Type inpatient    Arrived From home    Referral Source admission list    Reason for Consult discharge planning    Preferred Language English       Contact Information    Permission Granted to Share Info With                Functional Status    No documentation.                Psychosocial    No documentation.                Abuse/Neglect    No documentation.                Legal    No documentation.                Substance Abuse    No documentation.                Patient Forms    No documentation.                   Damari Nuñez RN

## 2022-09-13 NOTE — PLAN OF CARE
Goal Outcome Evaluation:  Plan of Care Reviewed With: patient        Progress: no change  Outcome Evaluation: Pt arrived to 6B for airborne precautions d/t varicella. No acute events overnight. VSS. Patient had BM 9/12. No complaints of pain. Will continue with current plan of care

## 2022-09-13 NOTE — PROGRESS NOTES
Neurology       Patient Care Team:  Rolando Jeronimo MD as PCP - General (Internal Medicine)    Chief complaint:   Chief Complaint   Patient presents with   • Stroke        History: She came in with altered mental status.  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  She is on acyclovir.  CSF protein 542, glucose 49, total WBC 1806, , slightly xanthochromic CSF, differential shows 81% lymphocytes in the CSF.  I believe she feels better.  She is not sure right now if she has a headache.  However mentation is better.  When I saw her day before she was completely confused.  Now she is awake alert able to answer questions readily.  Follows all the commands.  Past Medical History:   Diagnosis Date   • Asthma    • Benign hypertension    • Disease of thyroid gland    • Family history of heart disease    • Family history of heart disease    • Hypercholesterolemia    • Rheumatoid arthritis (HCC)    • Rheumatoid arthritis (HCC)    • Supraventricular tachycardia (HCC)     first diagnosed 06/01/2012   • Supraventricular tachycardia (HCC)        Vital Signs   Vitals:    09/12/22 2307 09/13/22 0428 09/13/22 0535 09/13/22 0754   BP: 133/74 106/66  128/78   BP Location: Left arm Left arm  Left arm   Patient Position: Lying Lying  Lying   Pulse: 73 90  96   Resp: 18 16  18   Temp: 98.3 °F (36.8 °C) 98.4 °F (36.9 °C)  98.5 °F (36.9 °C)   TempSrc: Oral Oral  Oral   SpO2: 95% 95%     Weight:   60.6 kg (133 lb 11.2 oz)    Height:           Physical Exam:   General: Fairly well built and nourished not in acute distress.              Neuro: Speech normal.  Oriented x3.  Moves all 4 limbs well.    Results Review:  She has mild hyponatremia.  Results from last 7 days   Lab Units 09/13/22  0547   WBC 10*3/mm3 7.33   HEMOGLOBIN g/dL 11.6*   HEMATOCRIT % 35.7   PLATELETS 10*3/mm3 158     Results from last 7 days   Lab Units 09/13/22  0547 09/13/22  0016 09/12/22  1849 09/12/22  0748 09/12/22  0651 09/11/22  1038 09/11/22  0540  09/10/22  1425 09/10/22  0437 09/09/22  1818 09/09/22 1817   SODIUM mmol/L 134* 132* 130*   < > 129*   < > 134*  135*   < > 133*  133*  --  133*   POTASSIUM mmol/L 3.7  --   --   --  3.7  --  3.7  3.7   < > 2.6*  --  3.1*   CHLORIDE mmol/L 100  --   --   --  95*  --  99  --  90*  --  87*   CO2 mmol/L 22.0  --   --   --  21.0*  --  23.0  --  29.0  --  25.0   BUN mg/dL 24*  --   --   --  15  --  10  --  11  --  11   CREATININE mg/dL 0.64  --   --   --  0.60  --  0.56*  --  0.74   < > 0.84   CALCIUM mg/dL 8.3*  --   --   --  8.5*  --  8.8  --  9.3  --  10.2   BILIRUBIN mg/dL  --   --   --   --   --   --   --   --  0.6  --   --    ALK PHOS U/L  --   --   --   --   --   --   --   --  35*  --   --    ALT (SGPT) U/L  --   --   --   --   --   --   --   --  25  --  34*   AST (SGOT) U/L  --   --   --   --   --   --   --   --  39*  --  43*   GLUCOSE mg/dL 86  --   --   --  82  --  93  --  106*  --  104*    < > = values in this interval not displayed.       Imaging Results (Last 24 Hours)     Procedure Component Value Units Date/Time    IR LUMBAR PUNCTURE DIAGNOSTIC [[id number]] Collected: 09/12/22 1616     Updated: 09/12/22 1623    Narrative:      EXAMINATION: IR LUMBAR PUNCTURE DIAGNOSTIC-     INDICATION: ? Cns; R41.82-Altered mental status, unspecified;  E87.6-Hypokalemia; R41.841-Cognitive communication deficit     TECHNIQUE: 6 seconds of fluoroscopic time was used for this procedure. 1  associated images were saved. Informed consent was obtained from the  patient's family member.     The patient was placed in the prone position on the table. The back was  prepped and draped in a sterile fashion. 1% lidocaine was used as a  local anesthetic to the skin and subcutaneous tissues. Under  fluoroscopic guidance a 22-gauge spinal needle was advanced at the L2-L3  level to the CSF space. Approximately 8 cc of xanthochromic CSF was  returned and collected in 4 numbered vials. Sample vials were labeled  and sent to pathology for  further analysis. The needle was removed.     COMPARISON: NONE     FINDINGS: The patient tolerated the procedure well, and no immediate  complications occurred.          Impression:      Successful fluoroscopic guided lumbar puncture as above with  no immediate complications.         This report was finalized on 9/12/2022 4:20 PM by Ceasar Nicole.             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    Plan:  Continue acyclovir.    Comment:  None other than above.         I discussed the patients findings and my recommendations with     Diego Jeronimo MD  09/13/22  10:04 EDT

## 2022-09-13 NOTE — PROGRESS NOTES
[facility] Medicine Services  PROGRESS NOTE    Patient Name: Vane Villavicencio  : 1945  MRN: [id number]    Date of Admission: 2022  Primary Care Physician: Rolando Jeronimo MD    Subjective   Subjective     CC:  Poor appetite    HPI:  Patient reports having aversion to food due to alteration in taste/smell.  Able to eat very little, states even ice chips feel strange.  Does feel that her mentation is improving.    ROS:  Gen- No fevers, chills  CV- No chest pain, palpitations  Resp- No cough, dyspnea  GI- No N/V/D, abd pain    Objective   Objective     Vital Signs:   Temp:  [98.3 °F (36.8 °C)-98.9 °F (37.2 °C)] 98.4 °F (36.9 °C)  Heart Rate:  [73-92] 90  Resp:  [16-18] 16  BP: (106-135)/(63-74) 106/66     Physical Exam:  Constitutional: Awake, alert, elderly female sitting up in bedside chair no acute distress  HENT: NCAT, mucous membranes moist  Respiratory: Clear to auscultation bilaterally, respiratory effort normal   Cardiovascular: RRR, no murmurs, rubs, or gallops  Gastrointestinal: Positive bowel sounds, soft, nontender, nondistended, Ortiz catheter in place  Musculoskeletal: No bilateral ankle edema  Psychiatric: Appropriate affect, cooperative  Neurologic: Oriented x 3, strength symmetric in all extremities, speech clear and fluent    Results Reviewed:  LAB RESULTS:      Lab 22  0547 22  0651 22  0540 09/10/22  0437 22  2325 22  2116 22  1818 22  1817   WBC 7.33 7.04 6.17 5.91  --   --   --  7.11   HEMOGLOBIN 11.6* 11.8* 11.4* 11.9*  --   --   --  13.6   HEMOGLOBIN, POC  --   --   --   --   --   --  15.0  --    HEMATOCRIT 35.7 35.3 34.2 34.9  --   --   --  40.0   HEMATOCRIT POC  --   --   --   --   --   --  44  --    PLATELETS 158 160 165 151  --   --   --  163   NEUTROS ABS 1.90 2.46 2.59 1.24*  --   --   --  2.24   IMMATURE GRANS (ABS) 0.02  --   --   --   --   --   --  0.02   LYMPHS ABS 3.70*  --   --   --   --   --   --  3.51*    MONOS ABS 1.36*  --   --   --   --   --   --  1.19*   EOS ABS 0.25 0.21 0.00 0.12  --   --   --  0.04   MCV 87.5 85.5 85.7 83.7  --   --   --  84.6   SED RATE  --   --  10  --   --   --   --   --    CRP  --   --  <0.30  --   --   --   --   --    PROCALCITONIN  --   --   --   --   --  0.03  --   --    LACTATE  --   --   --  1.7 2.8*  --   --   --    PROTIME  --   --   --   --   --   --  11.9*  --    APTT  --   --   --   --   --   --   --  28.8         Lab 09/13/22  0016 09/12/22  1849 09/12/22  1500 09/12/22  0748 09/12/22  0651 09/11/22  1038 09/11/22  0540 09/11/22  0017 09/10/22  1425 09/10/22  0437 09/09/22  1818 09/09/22  1817   SODIUM 132* 130* 133* 132* 129*   < > 134*  135*  --    < > 133*  133*  --  133*   POTASSIUM  --   --   --   --  3.7  --  3.7  3.7 4.2  --  2.6*  --  3.1*   CHLORIDE  --   --   --   --  95*  --  99  --   --  90*  --  87*   CO2  --   --   --   --  21.0*  --  23.0  --   --  29.0  --  25.0   ANION GAP  --   --   --   --  13.0  --  12.0  --   --  14.0  --  21.0*   BUN  --   --   --   --  15  --  10  --   --  11  --  11   CREATININE  --   --   --   --  0.60  --  0.56*  --   --  0.74 0.80 0.84   EGFR  --   --   --   --  93.7  --  95.3  --   --  84.5 76.9 72.6   GLUCOSE  --   --   --   --  82  --  93  --   --  106*  --  104*   CALCIUM  --   --   --   --  8.5*  --  8.8  --   --  9.3  --  10.2   MAGNESIUM  --   --   --   --   --   --   --   --   --  1.6  --  1.6   HEMOGLOBIN A1C  --   --   --   --   --   --   --   --   --  5.90*  --   --    TSH  --   --   --   --   --   --   --   --   --   --   --  2.070    < > = values in this interval not displayed.         Lab 09/10/22  0437 09/09/22 1817   TOTAL PROTEIN 6.4  --    ALBUMIN 3.90  --    GLOBULIN 2.5  --    ALT (SGPT) 25 34*   AST (SGOT) 39* 43*   BILIRUBIN 0.6  --    ALK PHOS 35*  --          Lab 09/09/22 2116 09/09/22 1818 09/09/22 1817   TROPONIN T <0.010  --  <0.010   PROTIME  --  11.9*  --    INR  --  1.0  --          Lab  09/10/22  0437   CHOLESTEROL 228*   LDL CHOL 131*   HDL CHOL 57   TRIGLYCERIDES 225*         Lab 09/10/22  0437   FOLATE 11.50   VITAMIN B 12 1,143*         Brief Urine Lab Results  (Last result in the past 365 days)      Color   Clarity   Blood   Leuk Est   Nitrite   Protein   CREAT   Urine HCG        09/09/22 1930 Yellow   Clear   Negative   Negative   Negative   Negative                 Microbiology Results Abnormal     Procedure Component Value - Date/Time    Blood Culture - Blood, Hand, Right [[id number]]  (Normal) Collected: 09/10/22 0437    Lab Status: Preliminary result Specimen: Blood from Hand, Right Updated: 09/13/22 0717     Blood Culture No growth at 3 days    Blood Culture - Blood, Arm, Left [[id number]]  (Normal) Collected: 09/10/22 0437    Lab Status: Preliminary result Specimen: Blood from Arm, Left Updated: 09/13/22 0717     Blood Culture No growth at 3 days    Culture, CSF - Cerebrospinal Fluid, Lumbar Puncture [[id number]] Collected: 09/12/22 1327    Lab Status: Preliminary result Specimen: Cerebrospinal Fluid from Lumbar Puncture Updated: 09/12/22 1735     Gram Stain Many (4+) WBCs seen      No organisms seen    Respiratory Panel PCR w/COVID-19(SARS-CoV-2) TITA/HELENA/BRANDY/PAD/COR/MAD/RISA In-House, NP Swab in UTM/VTM, 3-4 HR TAT - Swab, Nasopharynx [[id number]]  (Normal) Collected: 09/10/22 0506    Lab Status: Final result Specimen: Swab from Nasopharynx Updated: 09/10/22 0554     ADENOVIRUS, PCR Not Detected     Coronavirus 229E Not Detected     Coronavirus HKU1 Not Detected     Coronavirus NL63 Not Detected     Coronavirus OC43 Not Detected     COVID19 Not Detected     Human Metapneumovirus Not Detected     Human Rhinovirus/Enterovirus Not Detected     Influenza A PCR Not Detected     Influenza B PCR Not Detected     Parainfluenza Virus 1 Not Detected     Parainfluenza Virus 2 Not Detected     Parainfluenza Virus 3 Not Detected     Parainfluenza Virus 4 Not Detected     RSV, PCR Not Detected      Bordetella pertussis pcr Not Detected     Bordetella parapertussis PCR Not Detected     Chlamydophila pneumoniae PCR Not Detected     Mycoplasma pneumo by PCR Not Detected    Narrative:      In the setting of a positive respiratory panel with a viral infection PLUS a negative procalcitonin without other underlying concern for bacterial infection, consider observing off antibiotics or discontinuation of antibiotics and continue supportive care. If the respiratory panel is positive for atypical bacterial infection (Bordetella pertussis, Chlamydophila pneumoniae, or Mycoplasma pneumoniae), consider antibiotic de-escalation to target atypical bacterial infection.    COVID PRE-OP / PRE-PROCEDURE SCREENING ORDER (NO ISOLATION) - Swab, Nasopharynx [[id number]]  (Normal) Collected: 09/09/22 2115    Lab Status: Final result Specimen: Swab from Nasopharynx Updated: 09/09/22 2146    Narrative:      The following orders were created for panel order COVID PRE-OP / PRE-PROCEDURE SCREENING ORDER (NO ISOLATION) - Swab, Nasopharynx.  Procedure                               Abnormality         Status                     ---------                               -----------         ------                     COVID-19 and FLU A/B PCR...[[id number]]  Normal              Final result                 Please view results for these tests on the individual orders.    COVID-19 and FLU A/B PCR - Swab, Nasopharynx [[id number]]  (Normal) Collected: 09/09/22 2115    Lab Status: Final result Specimen: Swab from Nasopharynx Updated: 09/09/22 2146     COVID19 Not Detected     Influenza A PCR Not Detected     Influenza B PCR Not Detected    Narrative:      Fact sheet for providers: https://www.fda.gov/media/729315/download    Fact sheet for patients: https://www.fda.gov/media/476170/download    Test performed by PCR.          IR LUMBAR PUNCTURE DIAGNOSTIC    Result Date: 9/12/2022  EXAMINATION: IR LUMBAR PUNCTURE DIAGNOSTIC-  INDICATION: ? Cns;  R41.82-Altered mental status, unspecified; E87.6-Hypokalemia; R41.841-Cognitive communication deficit  TECHNIQUE: 6 seconds of fluoroscopic time was used for this procedure. 1 associated images were saved. Informed consent was obtained from the patient's family member.  The patient was placed in the prone position on the table. The back was prepped and draped in a sterile fashion. 1% lidocaine was used as a local anesthetic to the skin and subcutaneous tissues. Under fluoroscopic guidance a 22-gauge spinal needle was advanced at the L2-L3 level to the CSF space. Approximately 8 cc of xanthochromic CSF was returned and collected in 4 numbered vials. Sample vials were labeled and sent to pathology for further analysis. The needle was removed.  COMPARISON: NONE  FINDINGS: The patient tolerated the procedure well, and no immediate complications occurred.       Impression: Successful fluoroscopic guided lumbar puncture as above with no immediate complications.    This report was finalized on 9/12/2022 4:20 PM by Ceasar Nicole.        Results for orders placed during the hospital encounter of 09/09/22    Adult Transthoracic Echo Complete W/ Cont if Necessary Per Protocol (With Agitated Saline)    Interpretation Summary  · Left ventricular systolic function is hyperdynamic (EF > 70%).  · Left ventricular diastolic function is consistent with (grade I) impaired relaxation.  · The cardiac valves are anatomically and functionally normal.  · Nondiagnostic bubble study      I have reviewed the medications:  Scheduled Meds:acyclovir, 10 mg/kg, Intravenous, Q8H  amLODIPine, 5 mg, Oral, Q24H  aspirin, 325 mg, Oral, Daily   Or  aspirin, 300 mg, Rectal, Daily  atorvastatin, 80 mg, Oral, Nightly  cholecalciferol, 2,000 Units, Oral, Daily  metoprolol tartrate, 50 mg, Oral, Q12H  multivitamin with minerals, 1 tablet, Oral, Daily  predniSONE, 5 mg, Oral, Daily  sodium chloride, 10 mL, Intravenous, Q12H      Continuous Infusions:    PRN Meds:.•  acetaminophen  •  artificial tears  •  fluticasone  •  LORazepam  •  magnesium sulfate **OR** magnesium sulfate **OR** magnesium sulfate  •  ondansetron  •  potassium chloride **OR** potassium chloride **OR** potassium chloride  •  potassium phosphate infusion greater than 15 mMoles **OR** potassium phosphate infusion greater than 15 mMoles **OR** potassium phosphate **OR** sodium phosphate IVPB **OR** sodium phosphate IVPB **OR** sodium phosphate IVPB  •  sodium chloride  •  sodium chloride    Assessment & Plan   Assessment & Plan     Active Hospital Problems    Diagnosis  POA   • **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        Brief Hospital Course to date:  Vane Villavicencio is a 77 y.o. female w/ RA on leflunomide/chronic prednisone, Hx SVT, HTN/HLD, hypothyroidism who was brought to the hospital for evaluation of alteration in mental status and initially admitted as a code stroke; initial stroke eval did not demonstrate evidence for acute stroke, UA/UDS was not significantly contributory, she underwent LP 9/12 with meningitis PCR returning positive for varicella zoster    The following problems are new to me today    Assessment/plan    Varicella-zoster encephalitis  Acute metabolic encephalopathy, improving  Immunocompromised patient  - Initial stroke work-up unrevealing  -LP 9/12, lymphocytic predominant CSF, meningitis panel positive for VZV  -ID follows, continue IV acyclovir    Rheumatoid arthritis on chronic immunosuppressive therapy  - Recently started Kevzara 2 weeks PTA, previously on Orencia  - On chronic prednisone, leflunomide  -Continue home prednisone  -Follows with rheumatology [facility]    HTN  HLD  -Amlodipine,  aspirin, atorvastatin, Lopressor    LBP  Urinary retention  -S/p recent steroid injection  - MRI L-spine w/ disc protrusion and compression of cauda equina nerve roots  -Ortiz catheter placed 9/10, retention not likely to improve until spinal compression resolved  -Seen by NSGY, rec Tx acute infxn and follow-up in the clinic after recovery      Expected Discharge Location and Transportation: Potential Home with home health, Ortiz catheter, and outpatient follow-up with neurosurgery, family may transport  Expected Discharge Date: 9/15    DVT prophylaxis:  Mechanical DVT prophylaxis orders are present.     AM-PAC 6 Clicks Score (PT): 12 (09/12/22 8477)    CODE STATUS:   Code Status and Medical Interventions:   Ordered at: 09/09/22 9362     Level Of Support Discussed With:    Patient     Code Status (Patient has no pulse and is not breathing):    CPR (Attempt to Resuscitate)     Medical Interventions (Patient has pulse or is breathing):    Full Support       Omar De Jesus, DO  09/13/22

## 2022-09-14 LAB
ANION GAP SERPL CALCULATED.3IONS-SCNC: 10 MMOL/L (ref 5–15)
BUN SERPL-MCNC: 24 MG/DL (ref 8–23)
BUN/CREAT SERPL: 32 (ref 7–25)
CALCIUM SPEC-SCNC: 8.6 MG/DL (ref 8.6–10.5)
CHLORIDE SERPL-SCNC: 100 MMOL/L (ref 98–107)
CO2 SERPL-SCNC: 25 MMOL/L (ref 22–29)
CREAT SERPL-MCNC: 0.75 MG/DL (ref 0.57–1)
EGFRCR SERPLBLD CKD-EPI 2021: 83.1 ML/MIN/1.73
GIE STN SPEC: NORMAL
GLUCOSE SERPL-MCNC: 84 MG/DL (ref 65–99)
H CAPSUL AG UR QL IA: <0.5
MAGNESIUM SERPL-MCNC: 1.8 MG/DL (ref 1.6–2.4)
POTASSIUM SERPL-SCNC: 3.4 MMOL/L (ref 3.5–5.2)
REF LAB TEST METHOD: NORMAL
SODIUM SERPL-SCNC: 135 MMOL/L (ref 136–145)

## 2022-09-14 PROCEDURE — 0 MAGNESIUM SULFATE 4 GM/100ML SOLUTION: Performed by: STUDENT IN AN ORGANIZED HEALTH CARE EDUCATION/TRAINING PROGRAM

## 2022-09-14 PROCEDURE — 97530 THERAPEUTIC ACTIVITIES: CPT

## 2022-09-14 PROCEDURE — 25010000002 ACYCLOVIR PER 5 MG: Performed by: STUDENT IN AN ORGANIZED HEALTH CARE EDUCATION/TRAINING PROGRAM

## 2022-09-14 PROCEDURE — 99232 SBSQ HOSP IP/OBS MODERATE 35: CPT | Performed by: INTERNAL MEDICINE

## 2022-09-14 PROCEDURE — 92507 TX SP LANG VOICE COMM INDIV: CPT

## 2022-09-14 PROCEDURE — 63710000001 PREDNISONE PER 5 MG: Performed by: NURSE PRACTITIONER

## 2022-09-14 PROCEDURE — 83735 ASSAY OF MAGNESIUM: CPT | Performed by: INTERNAL MEDICINE

## 2022-09-14 PROCEDURE — 97110 THERAPEUTIC EXERCISES: CPT

## 2022-09-14 PROCEDURE — 99232 SBSQ HOSP IP/OBS MODERATE 35: CPT | Performed by: PSYCHIATRY & NEUROLOGY

## 2022-09-14 PROCEDURE — 25010000002 ONDANSETRON PER 1 MG: Performed by: STUDENT IN AN ORGANIZED HEALTH CARE EDUCATION/TRAINING PROGRAM

## 2022-09-14 PROCEDURE — 80048 BASIC METABOLIC PNL TOTAL CA: CPT | Performed by: INTERNAL MEDICINE

## 2022-09-14 RX ORDER — SODIUM CHLORIDE 9 MG/ML
100 INJECTION, SOLUTION INTRAVENOUS CONTINUOUS
Status: ACTIVE | OUTPATIENT
Start: 2022-09-14 | End: 2022-09-15

## 2022-09-14 RX ADMIN — Medication 10 ML: at 20:43

## 2022-09-14 RX ADMIN — METOPROLOL TARTRATE 50 MG: 50 TABLET, FILM COATED ORAL at 20:43

## 2022-09-14 RX ADMIN — ONDANSETRON 4 MG: 2 INJECTION INTRAMUSCULAR; INTRAVENOUS at 09:05

## 2022-09-14 RX ADMIN — ACYCLOVIR SODIUM 620 MG: 500 INJECTION, SOLUTION INTRAVENOUS at 20:44

## 2022-09-14 RX ADMIN — METOPROLOL TARTRATE 50 MG: 50 TABLET, FILM COATED ORAL at 08:59

## 2022-09-14 RX ADMIN — Medication 2000 UNITS: at 08:58

## 2022-09-14 RX ADMIN — POTASSIUM CHLORIDE 40 MEQ: 750 CAPSULE, EXTENDED RELEASE ORAL at 08:58

## 2022-09-14 RX ADMIN — MAGNESIUM SULFATE HEPTAHYDRATE 4 G: 40 INJECTION, SOLUTION INTRAVENOUS at 11:08

## 2022-09-14 RX ADMIN — SODIUM CHLORIDE 100 ML/HR: 9 INJECTION, SOLUTION INTRAVENOUS at 17:10

## 2022-09-14 RX ADMIN — AMLODIPINE BESYLATE 5 MG: 5 TABLET ORAL at 08:58

## 2022-09-14 RX ADMIN — ASPIRIN 325 MG ORAL TABLET 325 MG: 325 PILL ORAL at 08:58

## 2022-09-14 RX ADMIN — ONDANSETRON 4 MG: 2 INJECTION INTRAMUSCULAR; INTRAVENOUS at 20:53

## 2022-09-14 RX ADMIN — ACYCLOVIR SODIUM 620 MG: 500 INJECTION, SOLUTION INTRAVENOUS at 05:43

## 2022-09-14 RX ADMIN — ACYCLOVIR SODIUM 620 MG: 500 INJECTION, SOLUTION INTRAVENOUS at 15:34

## 2022-09-14 RX ADMIN — PREDNISONE 5 MG: 5 TABLET ORAL at 08:58

## 2022-09-14 RX ADMIN — ATORVASTATIN CALCIUM 80 MG: 40 TABLET, FILM COATED ORAL at 20:43

## 2022-09-14 RX ADMIN — MULTIPLE VITAMINS W/ MINERALS TAB 1 TABLET: TAB at 08:58

## 2022-09-14 RX ADMIN — POTASSIUM CHLORIDE 40 MEQ: 750 CAPSULE, EXTENDED RELEASE ORAL at 17:10

## 2022-09-14 NOTE — CASE MANAGEMENT/SOCIAL WORK
Continued Stay Note  [facility]     Patient Name: Vane Villavicencio  MRN: [id number]  Today's Date: 9/14/2022    Admit Date: 9/9/2022     Discharge Plan     Row Name 09/14/22 0837       Plan    Plan [facility]    Patient/Family in Agreement with Plan yes    Plan Comments Spoke with patient and  by phone. Plan is [facility] for rehab. Spoke with April at [facility] and made the referral. CM will continue to follow.    Final Discharge Disposition Code 62 - inpatient rehab facility               Discharge Codes    No documentation.               Expected Discharge Date and Time     Expected Discharge Date Expected Discharge Time    Sep 16, 2022             Perico Turner RN

## 2022-09-14 NOTE — PLAN OF CARE
Goal Outcome Evaluation:  Plan of Care Reviewed With: patient        Progress: no change  Outcome Evaluation: VSS. No complaints of pain. Slept well throughout night. Will continue with current plan of care

## 2022-09-14 NOTE — PLAN OF CARE
Goal Outcome Evaluation:  Plan of Care Reviewed With: patient, family            SLP treatment completed. Will sign-off cognitive communication. Please see note for further details and recommendations.

## 2022-09-14 NOTE — PROGRESS NOTES
[facility] Medicine Services  PROGRESS NOTE    Patient Name: Vane Villavicencio  : 1945  MRN: [id number]    Date of Admission: 2022  Primary Care Physician: Rolando Jeronimo MD    Subjective   Subjective     CC:  Follow-up VZV encephalopathy    HPI:  Doing well today, appetite is improving, able to eat a little bit more food.  Was symptomatically (dizziness) orthostatic with SBP dropped to 72 mmHg earlier today.    ROS:  Gen- No fevers, chills  CV- No chest pain, palpitations  Resp- No cough, dyspnea  GI- No N/V/D, abd pain    Objective   Objective     Vital Signs:   Temp:  [96.7 °F (35.9 °C)-98.5 °F (36.9 °C)] 96.7 °F (35.9 °C)  Heart Rate:  [74-96] 87  Resp:  [18-20] 18  BP: (128-150)/(66-97) 150/77     Physical Exam:  Constitutional: Awake, alert, elderly female sitting up in bed in no acute distress  HENT: NCAT, mucous membranes moist  Respiratory: Clear to auscultation bilaterally, respiratory effort normal   Cardiovascular: RRR, no murmurs, rubs, or gallops  Gastrointestinal: Positive bowel sounds, soft, nontender, nondistended, Ortiz catheter in place  Musculoskeletal: No bilateral ankle edema  Psychiatric: Appropriate affect, cooperative  Neurologic: Oriented x 3, strength symmetric in all extremities, speech clear and fluent    Results Reviewed:  LAB RESULTS:      Lab 22  0547 22  0651 22  0540 09/10/22  0437 22  2325 22  2116 22  1818 22  1817   WBC 7.33 6.7  7.04 6.17 5.91  --   --   --  7.11   HEMOGLOBIN 11.6* 11.8*  11.8 11.4* 11.9*  --   --   --  13.6   HEMOGLOBIN, POC  --   --   --   --   --   --  15.0  --    HEMATOCRIT 35.7 35.3  36.7 34.2 34.9  --   --   --  40.0   HEMATOCRIT POC  --   --   --   --   --   --  44  --    PLATELETS 158 160  160 165 151  --   --   --  163   NEUTROS ABS 1.90 2.46  1.7 2.59 1.24*  --   --   --  2.24   IMMATURE GRANS (ABS) 0.02  --   --   --   --   --   --  0.02   LYMPHS ABS 3.70* 3.6*  --    --   --   --   --  3.51*   MONOS ABS 1.36* 1.1*  --   --   --   --   --  1.19*   EOS ABS 0.25 0.21  0.2 0.00 0.12  --   --   --  0.04   MCV 87.5 85.5  87 85.7 83.7  --   --   --  84.6   SED RATE  --   --  10  --   --   --   --   --    CRP  --   --  <0.30  --   --   --   --   --    PROCALCITONIN  --   --   --   --   --  0.03  --   --    LACTATE  --   --   --  1.7 2.8*  --   --   --    PROTIME  --   --   --   --   --   --  11.9*  --    APTT  --   --   --   --   --   --   --  28.8         Lab 09/14/22  0502 09/13/22  0547 09/13/22  0016 09/12/22  1849 09/12/22  1500 09/12/22  0748 09/12/22  0651 09/11/22  1038 09/11/22  0540 09/11/22  0017 09/10/22  1425 09/10/22  0437 09/09/22  1818 09/09/22  1817   SODIUM 135* 134* 132* 130* 133*   < > 129*   < > 134*  135*  --    < > 133*  133*  --  133*   POTASSIUM 3.4* 3.7  --   --   --   --  3.7  --  3.7  3.7 4.2  --  2.6*  --  3.1*   CHLORIDE 100 100  --   --   --   --  95*  --  99  --   --  90*  --  87*   CO2 25.0 22.0  --   --   --   --  21.0*  --  23.0  --   --  29.0  --  25.0   ANION GAP 10.0 12.0  --   --   --   --  13.0  --  12.0  --   --  14.0  --  21.0*   BUN 24* 24*  --   --   --   --  15  --  10  --   --  11  --  11   CREATININE 0.75 0.64  --   --   --   --  0.60  --  0.56*  --   --  0.74   < > 0.84   EGFR 83.1 92.3  --   --   --   --  93.7  --  95.3  --   --  84.5   < > 72.6   GLUCOSE 84 86  --   --   --   --  82  --  93  --   --  106*  --  104*   CALCIUM 8.6 8.3*  --   --   --   --  8.5*  --  8.8  --   --  9.3  --  10.2   MAGNESIUM  --   --   --   --   --   --   --   --   --   --   --  1.6  --  1.6   HEMOGLOBIN A1C  --   --   --   --   --   --   --   --   --   --   --  5.90*  --   --    TSH  --   --   --   --   --   --   --   --   --   --   --   --   --  2.070    < > = values in this interval not displayed.         Lab 09/10/22  0437 09/09/22  1817   TOTAL PROTEIN 6.4  --    ALBUMIN 3.90  --    GLOBULIN 2.5  --    ALT (SGPT) 25 34*   AST (SGOT) 39* 43*    BILIRUBIN 0.6  --    ALK PHOS 35*  --          Lab 09/09/22  2116 09/09/22  1818 09/09/22  1817   TROPONIN T <0.010  --  <0.010   PROTIME  --  11.9*  --    INR  --  1.0  --          Lab 09/10/22  0437   CHOLESTEROL 228*   LDL CHOL 131*   HDL CHOL 57   TRIGLYCERIDES 225*         Lab 09/10/22  0437   FOLATE 11.50   VITAMIN B 12 1,143*         Brief Urine Lab Results  (Last result in the past 365 days)      Color   Clarity   Blood   Leuk Est   Nitrite   Protein   CREAT   Urine HCG        09/09/22 1930 Yellow   Clear   Negative   Negative   Negative   Negative                 Microbiology Results Abnormal     Procedure Component Value - Date/Time    Culture, CSF - Cerebrospinal Fluid, Lumbar Puncture [[id number]] Collected: 09/12/22 1327    Lab Status: Preliminary result Specimen: Cerebrospinal Fluid from Lumbar Puncture Updated: 09/13/22 1402     CSF Culture No growth     Gram Stain Many (4+) WBCs seen      No organisms seen    AFB Culture - Cerebrospinal Fluid, Lumbar Puncture [[id number]] Collected: 09/12/22 1327    Lab Status: Preliminary result Specimen: Cerebrospinal Fluid from Lumbar Puncture Updated: 09/13/22 1347     AFB Stain No acid fast bacilli seen on direct smear    Cryptococcal AG, CSF - Cerebrospinal Fluid, Lumbar Puncture [[id number]]  (Normal) Collected: 09/12/22 1327    Lab Status: Final result Specimen: Cerebrospinal Fluid from Lumbar Puncture Updated: 09/13/22 1050     Cryptococcal Antigen, CSF Negative    S. Pneumo Ag Urine or CSF - Urine, Urine, Clean Catch [[id number]]  (Normal) Collected: 09/13/22 0422    Lab Status: Final result Specimen: Urine, Clean Catch Updated: 09/13/22 0934     Strep Pneumo Ag Negative    Blood Culture - Blood, Hand, Right [[id number]]  (Normal) Collected: 09/10/22 0437    Lab Status: Preliminary result Specimen: Blood from Hand, Right Updated: 09/13/22 0717     Blood Culture No growth at 3 days    Blood Culture - Blood, Arm, Left [[id number]]  (Normal) Collected: 09/10/22  0437    Lab Status: Preliminary result Specimen: Blood from Arm, Left Updated: 09/13/22 0717     Blood Culture No growth at 3 days    Respiratory Panel PCR w/COVID-19(SARS-CoV-2) TITA/HELENA/BRANDY/PAD/COR/MAD/RISA In-House, NP Swab in UTM/VTM, 3-4 HR TAT - Swab, Nasopharynx [[id number]]  (Normal) Collected: 09/10/22 0506    Lab Status: Final result Specimen: Swab from Nasopharynx Updated: 09/10/22 0554     ADENOVIRUS, PCR Not Detected     Coronavirus 229E Not Detected     Coronavirus HKU1 Not Detected     Coronavirus NL63 Not Detected     Coronavirus OC43 Not Detected     COVID19 Not Detected     Human Metapneumovirus Not Detected     Human Rhinovirus/Enterovirus Not Detected     Influenza A PCR Not Detected     Influenza B PCR Not Detected     Parainfluenza Virus 1 Not Detected     Parainfluenza Virus 2 Not Detected     Parainfluenza Virus 3 Not Detected     Parainfluenza Virus 4 Not Detected     RSV, PCR Not Detected     Bordetella pertussis pcr Not Detected     Bordetella parapertussis PCR Not Detected     Chlamydophila pneumoniae PCR Not Detected     Mycoplasma pneumo by PCR Not Detected    Narrative:      In the setting of a positive respiratory panel with a viral infection PLUS a negative procalcitonin without other underlying concern for bacterial infection, consider observing off antibiotics or discontinuation of antibiotics and continue supportive care. If the respiratory panel is positive for atypical bacterial infection (Bordetella pertussis, Chlamydophila pneumoniae, or Mycoplasma pneumoniae), consider antibiotic de-escalation to target atypical bacterial infection.    COVID PRE-OP / PRE-PROCEDURE SCREENING ORDER (NO ISOLATION) - Swab, Nasopharynx [[id number]]  (Normal) Collected: 09/09/22 2115    Lab Status: Final result Specimen: Swab from Nasopharynx Updated: 09/09/22 2146    Narrative:      The following orders were created for panel order COVID PRE-OP / PRE-PROCEDURE SCREENING ORDER (NO ISOLATION) - Swab,  Nasopharynx.  Procedure                               Abnormality         Status                     ---------                               -----------         ------                     COVID-19 and FLU A/B PCR...[[id number]]  Normal              Final result                 Please view results for these tests on the individual orders.    COVID-19 and FLU A/B PCR - Swab, Nasopharynx [[id number]]  (Normal) Collected: 09/09/22 2115    Lab Status: Final result Specimen: Swab from Nasopharynx Updated: 09/09/22 2146     COVID19 Not Detected     Influenza A PCR Not Detected     Influenza B PCR Not Detected    Narrative:      Fact sheet for providers: https://www.fda.gov/media/400867/download    Fact sheet for patients: https://www.fda.gov/media/132275/download    Test performed by PCR.          IR LUMBAR PUNCTURE DIAGNOSTIC    Result Date: 9/12/2022  EXAMINATION: IR LUMBAR PUNCTURE DIAGNOSTIC-  INDICATION: ? Cns; R41.82-Altered mental status, unspecified; E87.6-Hypokalemia; R41.841-Cognitive communication deficit  TECHNIQUE: 6 seconds of fluoroscopic time was used for this procedure. 1 associated images were saved. Informed consent was obtained from the patient's family member.  The patient was placed in the prone position on the table. The back was prepped and draped in a sterile fashion. 1% lidocaine was used as a local anesthetic to the skin and subcutaneous tissues. Under fluoroscopic guidance a 22-gauge spinal needle was advanced at the L2-L3 level to the CSF space. Approximately 8 cc of xanthochromic CSF was returned and collected in 4 numbered vials. Sample vials were labeled and sent to pathology for further analysis. The needle was removed.  COMPARISON: NONE  FINDINGS: The patient tolerated the procedure well, and no immediate complications occurred.       Impression: Successful fluoroscopic guided lumbar puncture as above with no immediate complications.    This report was finalized on 9/12/2022 4:20 PM by Ceasar  Corey.        Results for orders placed during the hospital encounter of 09/09/22    Adult Transthoracic Echo Complete W/ Cont if Necessary Per Protocol (With Agitated Saline)    Interpretation Summary  · Left ventricular systolic function is hyperdynamic (EF > 70%).  · Left ventricular diastolic function is consistent with (grade I) impaired relaxation.  · The cardiac valves are anatomically and functionally normal.  · Nondiagnostic bubble study      I have reviewed the medications:  Scheduled Meds:acyclovir, 10 mg/kg, Intravenous, Q8H  amLODIPine, 5 mg, Oral, Q24H  aspirin, 325 mg, Oral, Daily   Or  aspirin, 300 mg, Rectal, Daily  atorvastatin, 80 mg, Oral, Nightly  cholecalciferol, 2,000 Units, Oral, Daily  metoprolol tartrate, 50 mg, Oral, Q12H  multivitamin with minerals, 1 tablet, Oral, Daily  predniSONE, 5 mg, Oral, Daily  sodium chloride, 10 mL, Intravenous, Q12H      Continuous Infusions:   PRN Meds:.•  acetaminophen  •  artificial tears  •  fluticasone  •  LORazepam  •  magnesium sulfate **OR** magnesium sulfate **OR** magnesium sulfate  •  ondansetron  •  potassium chloride **OR** potassium chloride **OR** potassium chloride  •  potassium phosphate infusion greater than 15 mMoles **OR** potassium phosphate infusion greater than 15 mMoles **OR** potassium phosphate **OR** sodium phosphate IVPB **OR** sodium phosphate IVPB **OR** sodium phosphate IVPB  •  sodium chloride  •  sodium chloride    Assessment & Plan   Assessment & Plan     Active Hospital Problems    Diagnosis  POA   • **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        Brief Hospital Course to date:  Vane Villavicencio is a 77 y.o. female w/ RA on leflunomide/chronic prednisone, Hx  SVT, HTN/HLD, hypothyroidism who was brought to the hospital for evaluation of alteration in mental status and initially admitted as a code stroke; initial stroke eval did not demonstrate evidence for acute stroke, UA/UDS was not significantly contributory, she underwent LP 9/12 with meningitis PCR returning positive for varicella zoster    Assessment/plan    Varicella-zoster encephalitis  Acute metabolic encephalopathy, improving  Immunocompromised patient  - Initial stroke work-up unrevealing  -LP 9/12, lymphocytic predominant CSF, meningitis panel positive for VZV  -ID follows, planning x10 days therapy, IV acyclovir while hospitalized, transition to Valtrex if discharged prior to completion; eventual chronic suppressive therapy 2/2 ongoing immunosuppressed state    Orthostatic hypotension  -Poor oral intake for multiple days, fluid trial today    Rheumatoid arthritis on chronic immunosuppressive therapy  - Recently started Kevzara 2 weeks PTA, previously on Orencia  - On chronic prednisone, leflunomide  -Continue home prednisone  -Follow[facility]    HTN  HLD  -Amlodipine, aspirin, atorvastatin, Lopressor    LBP  Urinary retention  -S/p recent steroid injection  - MRI L-spine w/ disc protrusion and compression of cauda equina nerve roots  -Ortiz catheter placed 9/10, retention not likely to improve until spinal compression resolved  -Seen by NSGY, rec Tx acute infxn and follow-up in the clinic after recovery      Expected Discharge Location and Transportation: Rehab referrals pending, family vs medical van  Expected Discharge Date: 9/16    DVT prophylaxis:  Mechanical DVT prophylaxis orders are present.     AM-PAC 6 Clicks Score (PT): 12 (09/12/22 5924)    CODE STATUS:   Code Status and Medical Interventions:   Ordered at: 09/09/22 0759     Level Of Support Discussed With:    Patient     Code Status (Patient has no pulse and is not breathing):    CPR (Attempt to Resuscitate)     Medical  Interventions (Patient has pulse or is breathing):    Full Support       Omar De Jesus, DO  09/14/22

## 2022-09-14 NOTE — THERAPY TREATMENT NOTE
Acute Care - Speech Language Pathology Treatment Note  [facility]     Patient Name: Vane Villavicencio  : 1945  MRN: [id number]  Today's Date: 2022               Admit Date: 2022     Visit Dx:    ICD-10-CM ICD-9-CM   1. Altered mental status, unspecified altered mental status type  R41.82 780.97   2. Hypokalemia  E87.6 276.8     Patient Active Problem List   Diagnosis   • Supraventricular tachycardia (HCC)   • Rheumatoid arthritis (HCC)   • Hypercholesterolemia   • Benign hypertension   • Abnormal stress test   • Metabolic encephalopathy   • Hypokalemia   • Varicella encephalitis   • Immunocompromised (HCC)     Past Medical History:   Diagnosis Date   • Asthma    • Benign hypertension    • Disease of thyroid gland    • Family history of heart disease    • Family history of heart disease    • Hypercholesterolemia    • Rheumatoid arthritis (HCC)    • Rheumatoid arthritis (HCC)    • Supraventricular tachycardia (HCC)     first diagnosed 2012   • Supraventricular tachycardia (HCC)      Past Surgical History:   Procedure Laterality Date   • CARDIAC CATHETERIZATION N/A 7/10/2020    Procedure: Left Heart Cath;  Surgeon: Pankaj Pollard MD;  Location: [facility] INVASIVE LOCATION;  Service: Cardiology;  Laterality: N/A;   • SINUS SURGERY         SLP Recommendation and Plan  SLP Diagnosis: Functional speech/language/cognition; back to prior level of function c/t level during initial eval. (22 134)        SLC Criteria for Skilled Therapy Interventions Met: no problems identified which require skilled intervention (22 134)  Anticipated Discharge Disposition (SLP): home (22 134)           Daily Summary of Progress (SLP): progress toward functional goals is good (22 134)           Treatment Assessment (SLP): pt w/ significant improvements in cog-comm skills during session. SLP to sign off at this time d/t functional status. (22 134)  Plan for Continued Treatment  (SLP): treatment no longer indicated as all goals met (09/14/22 1340)  Plan of Care Reviewed With: patient, family (09/14/22 2600)      SLP EVALUATION (last 72 hours)     SLP SLC Evaluation     Row Name 09/14/22 5070                   Communication Assessment/Intervention    Document Type therapy note (daily note)  -EN        Subjective Information no complaints  -EN        Patient Observations alert;cooperative;agree to therapy  -EN        Patient/Family/Caregiver Comments/Observations family members present  -EN        Patient Effort excellent  -EN        Symptoms Noted During/After Treatment none  -EN                  General Information    Patient Profile Reviewed yes  -EN                  Pain Scale: FACES Pre/Post-Treatment    Pain: FACES Scale, Pretreatment 0-->no hurt  -EN        Posttreatment Pain Rating 0-->no hurt  -EN                  SLP Evaluation Clinical Impressions    SLP Diagnosis Functional speech/language/cognition; back to prior level of function c/t level during initial eval.  -EN        Rehab Potential/Prognosis good  -EN        SLC Criteria for Skilled Therapy Interventions Met no problems identified which require skilled intervention  -EN        Functional Impact no impact on function  -EN                  SLP Treatment Clinical Impressions    Treatment Assessment (SLP) pt w/ significant improvements in cog-comm skills during session. SLP to sign off at this time d/t functional status.  -EN        Daily Summary of Progress (SLP) progress toward functional goals is good  -EN        Plan for Continued Treatment (SLP) treatment no longer indicated as all goals met  -EN        Care Plan Review care plan/treatment goals reviewed  -EN        Care Plan Review, Other Participant(s) family  -EN                  Recommendations    Anticipated Discharge Disposition (SLP) home  -EN              User Key  (r) = Recorded By, (t) = Taken By, (c) = Cosigned By    Initials Name Effective Dates    EN [place],  Deyanira MARTINEZ MS CCC-SLP 06/22/22 -                    EDUCATION  The patient has been educated in the following areas:     Communication Impairment.           SLP GOALS     Row Name 09/14/22 1340             Follow Directions Goal 2 (SLP)    Improve Ability to Follow Directions Goal 1 (SLP) 3 step commands;80%;with minimal cues (75-90%)  -EN      Time Frame (Follow Directions Goal 1, SLP) short term goal (STG)  -EN      Progress (Ability to Follow Directions Goal 1, SLP) 100%;independently (over 90% accuracy)  -EN      Progress/Outcomes (Follow Directions Goal 1, SLP) goal met  -EN              Word Retrieval Skills Goal 1 (SLP)    Improve Word Retrieval Skills By Goal 1 (SLP) responsive naming task;completing a divergent task;90%;independently (over 90% accuracy)  -EN      Time Frame (Word Retrieval Goal 1, SLP) short term goal (STG)  -EN      Progress (Word Retrieval Skills Goal 1, SLP) 100%;independently (over 90% accuracy)  -EN      Progress/Outcomes (Word Retrieval Goal 1, SLP) goal met  -EN              Connected Speech to Express Thoughts Goal 1 (SLP)    Improve Narrative Discourse to Express Thoughts By Goal 1 (SLP) describing a picture;conversational task on a given topic;90%;independently (over 90% accuracy)  -EN      Time Frame (Connected Speech Goal 1, SLP) short term goal (STG)  -EN      Progress (Connected Speech Goal 1, SLP) 100%;independently (over 90% accuracy)  -EN      Progress/Outcomes (Connected Speech Goal 1, SLP) goal met  -EN              Awareness of Basic Personal Information Goal 1 (SLP)    Improve Awareness of Basic Personal Information Goal 1 (SLP) answering open-ended questions regarding personal/biographical information;90%;with minimal cues (75-90%)  -EN      Time Frame (Awareness of Basic Personal Information Goal 1, SLP) short term goal (STG)  -EN      Progress (Awareness of Basic Personal Information Goal 1, SLP) 100%;independently (over 90% accuracy)  -EN      Progress/Outcomes  (Awareness of Basic Personal Information Goal 1, SLP) goal met  -EN              Organizational Skills Goal 1 (SLP)    Improve Thought Organization Through Goal 1 (SLP) naming similarities and differences;completing a divergent naming task;80%;with minimal cues (75-90%)  -EN      Time Frame (Thought Organization Skills Goal 1, SLP) short term goal (STG)  -EN      Progress (Thought Organization Skills Goal 1, SLP) 100%;independently (over 90% accuracy)  -EN      Progress/Outcomes (Thought Organization Skills Goal 1, SLP) goal met  -EN              Additional Goal 1 (SLP)    Additional Goal 1, SLP LTG: Pt will demonstrate funcitonal cognitive-communication skills for discharge environment with mod cues.  -EN      Time Frame (Additional Goal 1, SLP) by discharge  -EN      Progress/Outcomes (Additional Goal 1, SLP) goal met  -EN            User Key  (r) = Recorded By, (t) = Taken By, (c) = Cosigned By    Initials Name Provider Type    Deyanira Rodríguez MS CCC-SLP Speech and Language Pathologist                        Time Calculation:      Time Calculation- SLP     Row Name 09/14/22 1458             Time Calculation- SLP    SLP Start Time 1340  -EN      SLP Received On 09/14/22  -EN              Untimed Charges    [id number] Treatment/ST Modification Prosth Aug Alter  25  -EN              Total Minutes    Untimed Charges Total Minutes 25  -EN       Total Minutes 25  -EN            User Key  (r) = Recorded By, (t) = Taken By, (c) = Cosigned By    Initials Name Provider Type    Deyanira Rodríguez MS CCC-SLP Speech and Language Pathologist                Therapy Charges for Today     Code Description Service Date Service Provider Modifiers Qty    [id number]  ST TREATMENT SPEECH 2 9/14/2022 Deyanira De La Rosa MS CCC-SLP GN 1                     Deyanira De La Rosa MS CCC-SLP  9/14/2022

## 2022-09-14 NOTE — THERAPY TREATMENT NOTE
Patient Name: Vane Villavicencio  : 1945    MRN: [id number]                              Today's Date: 2022       Admit Date: 2022    Visit Dx:     ICD-10-CM ICD-9-CM   1. Altered mental status, unspecified altered mental status type  R41.82 780.97   2. Hypokalemia  E87.6 276.8   3. Cognitive communication deficit  R41.841 799.52     Patient Active Problem List   Diagnosis   • Supraventricular tachycardia (HCC)   • Rheumatoid arthritis (HCC)   • Hypercholesterolemia   • Benign hypertension   • Abnormal stress test   • Metabolic encephalopathy   • Hypokalemia   • Varicella encephalitis   • Immunocompromised (HCC)     Past Medical History:   Diagnosis Date   • Asthma    • Benign hypertension    • Disease of thyroid gland    • Family history of heart disease    • Family history of heart disease    • Hypercholesterolemia    • Rheumatoid arthritis (HCC)    • Rheumatoid arthritis (HCC)    • Supraventricular tachycardia (HCC)     first diagnosed 2012   • Supraventricular tachycardia (HCC)      Past Surgical History:   Procedure Laterality Date   • CARDIAC CATHETERIZATION N/A 7/10/2020    Procedure: Left Heart Cath;  Surgeon: Pankaj Pollard MD;  Location: [facility] CATH INVASIVE LOCATION;  Service: Cardiology;  Laterality: N/A;   • SINUS SURGERY        General Information     Row Name 22 1009          Physical Therapy Time and Intention    Document Type therapy note (daily note)  -     Mode of Treatment physical therapy;individual therapy  -     Row Name 22 1009          General Information    Existing Precautions/Restrictions fall;other (see comments);orthostatic hypotension  [place]  -     Barriers to Rehab medically complex  -     Row Name 22 1009          Cognition    Orientation Status (Cognition) oriented x 3  -     Row Name 22 1009          Safety Issues, Functional Mobility    Safety Issues Affecting Function (Mobility) awareness of need for assistance;insight  into deficits/self-awareness;positioning of assistive device;safety precaution awareness;safety precautions follow-through/compliance;sequencing abilities  -     Impairments Affecting Function (Mobility) balance;cognition;endurance/activity tolerance;coordination;strength;postural/trunk control  -     Comment, Safety Issues/Impairments (Mobility) Rapid fatigue with minimal exertion  -           User Key  (r) = Recorded By, (t) = Taken By, (c) = Cosigned By    Initials Name Provider Type     Chapincito Bolivar, PT Physical Therapist               Mobility     Row Name 09/14/22 1009          Bed Mobility    Bed Mobility supine-sit;sit-supine  -     Supine-Sit Newborn (Bed Mobility) contact guard;1 person assist;verbal cues;nonverbal cues (demo/gesture)  -     Sit-Supine Newborn (Bed Mobility) minimum assist (75% patient effort);1 person assist;verbal cues;nonverbal cues (demo/gesture)  -     Assistive Device (Bed Mobility) bed rails;head of bed elevated  -     Comment, (Bed Mobility) Pt required significant increase in time and effort to achieve EOB although no physical assist required for supine to sit. Pt required Alon to position BLEs in bed during sit to supine. Pt with mild complaints of light headedness upon sitting EOB.  -[facility] Name 09/14/22 1009          Transfers    Comment, (Transfers) Pt performed STS from EOB and BSC and performed stand pivot transfer from BSC to EOB. Pt required VCs for proper sequencing and positioning of RW. Cues required for proper hand placement with RW for safety. Pt with increased reports of light headedness and nausea while sitting on BSC with BP decreasing to 77/52. Pt immediately returned to supine with BP recorded at 133/71 in supine.  -[facility] Name 09/14/22 1009          Bed-Chair Transfer    Bed-Chair Newborn (Transfers) minimum assist (75% patient effort);1 person assist;verbal cues;nonverbal cues (demo/gesture)  -     Assistive Device  (Bed-Chair Transfers) walker, front-wheeled  -     Row Name 09/14/22 1009          Sit-Stand Transfer    Sit-Stand [place] (Transfers) minimum assist (75% patient effort);1 person assist;verbal cues;nonverbal cues (demo/gesture)  -     Assistive Device (Sit-Stand Transfers) walker, front-wheeled  -     Row Name 09/14/22 1009          Gait/Stairs (Locomotion)    [place] Level (Gait) minimum assist (75% patient effort);1 person assist;verbal cues;nonverbal cues (demo/gesture)  -     Assistive Device (Gait) walker, front-wheeled  -     Distance in Feet (Gait) 2  -     Deviations/Abnormal Patterns (Gait) bilateral deviations;tori decreased;festinating/shuffling;gait speed decreased;stride length decreased  -     Bilateral Gait Deviations forward flexed posture  -     Comment, (Gait/Stairs) Pt demonstrated shuffling gait pattern with significant forward flexed posture to ambulate from bed to BSC. Pt limited d/t fatigue, tremors, and light headedness/nausea. Pt required physical assist to position RW and assist to position BSC behind pt d/t urgent need to return to sitting.  -           User Key  (r) = Recorded By, (t) = Taken By, (c) = Cosigned By    Initials Name Provider Type     Chapincito Bolivar, PT Physical Therapist               Obj/Interventions     [facility] Name 09/14/22 1014          Motor Skills    Therapeutic Exercise hip;knee;ankle  -[facility] Name 09/14/22 1014          Knee (Therapeutic Exercise)    Knee (Therapeutic Exercise) strengthening exercise  -     Knee Strengthening (Therapeutic Exercise) bilateral;SLR (straight leg raise);supine;10 repetitions  -[facility] Name 09/14/22 1014          Ankle (Therapeutic Exercise)    Ankle (Therapeutic Exercise) AROM (active range of motion)  -     Ankle AROM (Therapeutic Exercise) bilateral;dorsiflexion;plantarflexion;supine;10 repetitions  -[facility] Name 09/14/22 1014          Balance    Balance Assessment sitting static  balance;sitting dynamic balance;standing static balance;standing dynamic balance  -     Static Sitting Balance supervision  -     Dynamic Sitting Balance contact guard  -     Position, Sitting Balance unsupported;sitting edge of bed  -     Static Standing Balance minimal assist;1-person assist;verbal cues  -     Dynamic Standing Balance minimal assist;1-person assist;verbal cues  -     Position/Device Used, Standing Balance supported;walker, front-wheeled  -     Comment, Balance Pt with forward flexed posture and moderate instability requiring Alon to maintain balance.  -           User Key  (r) = Recorded By, (t) = Taken By, (c) = Cosigned By    Initials Name Provider Type     Chapincito Bolivar, PT Physical Therapist               Goals/Plan    No documentation.                Clinical Impression     Row Name 09/14/22 1015          Pain    Pretreatment Pain Rating 0/10 - no pain  -     Posttreatment Pain Rating 0/10 - no pain  -     Row Name 09/14/22 1015          Plan of Care Review    Plan of Care Reviewed With patient  -     Progress improving  -     Outcome Evaluation Pt very motivated to participate in physical therapy session. Pt demonstrating small improvements in functional mobility although grossly limited this session d/t significant decrease in BP upon sitting on BS (77/53 in sitting, 133/71 in supine), with complaints of light headedness and nausea. Pt performed bed mobility with CGA, STS and stand pivot transfers with MinAx1 and RW, and ambulated 2ft from bed to [place] with MinAx1 and RW. PT continuing to recommend inpatient rehab at [place].  -     Row Name 09/14/22 1015          Vital Signs    Pre Systolic BP Rehab 150  supine  -     Pre Treatment Diastolic BP 77  -     Intra Systolic BP Rehab 77  sitting on BSC  -     Intra Treatment Diastolic BP 52  -LH     Post Systolic BP Rehab 133  supine  -     Post Treatment Diastolic BP 71  -LH     Pretreatment Heart Rate  (beats/min) 88  -LH     Posttreatment Heart Rate (beats/min) 71  -LH     Pre SpO2 (%) 95  -LH     O2 Delivery Pre Treatment room air  -LH     O2 Delivery Intra Treatment room air  -LH     Post SpO2 (%) 95  -LH     O2 Delivery Post Treatment room air  -LH     Pre Patient Position Supine  -LH     Intra Patient Position Sitting  -LH     Post Patient Position Supine  -LH     Row Name 09/14/22 1015          Positioning and Restraints    Pre-Treatment Position in bed  -LH     Post Treatment Position bed  -LH     In Bed notified nsg;supine;call light within reach;encouraged to call for assist;exit alarm on  -           User Key  (r) = Recorded By, (t) = Taken By, (c) = Cosigned By    Initials Name Provider Type     Chapincito Bolivar, PT Physical Therapist               Outcome Measures     Row Name 09/14/22 1020          How much help from another person do you currently need...    Turning from your back to your side while in flat bed without using bedrails? 3  -LH     Moving from lying on back to sitting on the side of a flat bed without bedrails? 3  -LH     Moving to and from a bed to a chair (including a wheelchair)? 3  -LH     Standing up from a chair using your arms (e.g., wheelchair, bedside chair)? 3  -LH     Climbing 3-5 steps with a railing? 1  -LH     To walk in hospital room? 2  -     AM-PAC 6 Clicks Score (PT) 15  -     Highest level of mobility 4 --> Transferred to chair/commode  -           User Key  (r) = Recorded By, (t) = Taken By, (c) = Cosigned By    Initials Name Provider Type     Chapincito Bolivar, PT Physical Therapist                             Physical Therapy Education                 Title: PT OT SLP Therapies (Done)     Topic: Physical Therapy (Done)     Point: Mobility training (Done)     Learning Progress Summary           Patient Acceptance, E, VU by  at 9/14/2022 1020    Comment: reviewed safety with mobility, PT POC, and importance of HOB elevation for orthostatic hypotension  management.    Acceptance, E, VU,NR by  at 9/11/2022 1553    Comment: SEE FLOWSHEET                   Point: Home exercise program (Done)     Learning Progress Summary           Patient Acceptance, E, VU by  at 9/14/2022 1020    Comment: reviewed safety with mobility, PT POC, and importance of HOB elevation for orthostatic hypotension management.    Acceptance, E, VU,NR by  at 9/11/2022 1553    Comment: SEE FLOWSHEET                   Point: Body mechanics (Done)     Learning Progress Summary           Patient Acceptance, E, VU by  at 9/14/2022 1020    Comment: reviewed safety with mobility, PT POC, and importance of HOB elevation for orthostatic hypotension management.    Acceptance, E, VU,NR by  at 9/11/2022 1553    Comment: SEE FLOWSHEET                   Point: Precautions (Done)     Learning Progress Summary           Patient Acceptance, E, VU by  at 9/14/2022 1020    Comment: reviewed safety with mobility, PT POC, and importance of HOB elevation for orthostatic hypotension management.    Acceptance, E, VU,NR by  at 9/11/2022 1553    Comment: SEE FLOWSHEET                               User Key     Initials Effective Dates Name Provider Type Discipline     06/16/21 -  Marj Dawkins, PT Physical Therapist PT     09/21/21 -  Chapincito Bolivar, PT Physical Therapist PT              PT Recommendation and Plan     Plan of Care Reviewed With: patient  Progress: improving  Outcome Evaluation: Pt very motivated to participate in physical therapy session. Pt demonstrating small improvements in functional mobility although grossly limited this session d/t significant decrease in BP upon sitting on BSC (77/53 in sitting, 133/71 in supine), with complaints of light headedness and nausea. Pt performed bed mobility with CGA, STS and stand pivot transfers with MinAx1 and RW, and ambulated 2ft from bed to BSC with MinAx1 and RW. PT continuing to recommend inpatient rehab at [place].     Time Calculation:    PT  Charges     Row Name 09/14/22 1021             Time Calculation    Start Time 0926  -      PT Received On 09/14/22  -      PT Goal Re-Cert Due Date 09/21/22  -              Timed Charges    04057 - PT Therapeutic Exercise Minutes 8  -      53505 - Gait Training Minutes  5  -      84598 - PT Therapeutic Activity Minutes 26  -              Total Minutes    Timed Charges Total Minutes 39  -       Total Minutes 39  -            User Key  (r) = Recorded By, (t) = Taken By, (c) = Cosigned By    Initials Name Provider Type     Chapincito Bolivar, PT Physical Therapist              Therapy Charges for Today     Code Description Service Date Service Provider Modifiers Qty    57678528165 HC PT THERAPEUTIC ACT EA 15 MIN 9/14/2022 Chapincito Bolivar, PT GP 2    34764916334 HC PT THER PROC EA 15 MIN 9/14/2022 Chapincito Bolivar, PT GP 1          PT G-Codes  Outcome Measure Options: AM-PAC 6 Clicks Daily Activity (OT)  AM-PAC 6 Clicks Score (PT): 15  AM-PAC 6 Clicks Score (OT): 16  Modified [place] Scale: 4 - Moderately severe disability.  Unable to walk without assistance, and unable to attend to own bodily needs without assistance.    Chapincito Bolivar PT  9/14/2022

## 2022-09-14 NOTE — PROGRESS NOTES
Neurology       Patient Care Team:  Rolando Jeronimo MD as PCP - General (Internal Medicine)    Chief complaint:   Chief Complaint   Patient presents with   • Stroke        History: She was encephalopathic.  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  Weekly she is doing quite well.  She remains afebrile.  Does not have any headaches nausea vomiting.  Mentation is fairly close to her baseline.  According to her.  Past Medical History:   Diagnosis Date   • Asthma    • Benign hypertension    • Disease of thyroid gland    • Family history of heart disease    • Family history of heart disease    • Hypercholesterolemia    • Rheumatoid arthritis (HCC)    • Rheumatoid arthritis (HCC)    • Supraventricular tachycardia (HCC)     first diagnosed 06/01/2012   • Supraventricular tachycardia (HCC)        Vital Signs   Vitals:    09/14/22 0348 09/14/22 0552 09/14/22 0714 09/14/22 1120   BP: 139/73  150/77 133/68   BP Location: Right arm  Left arm Right arm   Patient Position: Lying  Lying Lying   Pulse: 86  87 73   Resp: 18  18 18   Temp: 98 °F (36.7 °C)  96.7 °F (35.9 °C) 97.6 °F (36.4 °C)   TempSrc: Oral  Oral Oral   SpO2: 96%      Weight:  59.4 kg (131 lb)     Height:           Physical Exam:   General: Fairly well built and nourished not in acute distress.  Speech normal.              Neuro: Oriented x3.  Eye movements normal follows two-step commands.  Mild generalized weakness    Results Review:    Results from last 7 days   Lab Units 09/13/22  0547   WBC 10*3/mm3 7.33   HEMOGLOBIN g/dL 11.6*   HEMATOCRIT % 35.7   PLATELETS 10*3/mm3 158     Results from last 7 days   Lab Units 09/14/22  0502 09/13/22  0547 09/13/22  0016 09/12/22  0748 09/12/22  0651 09/10/22  1425 09/10/22  0437 09/09/22  1818 09/09/22  1817   SODIUM mmol/L 135* 134* 132*   < > 129*   < > 133*  133*  --  133*   POTASSIUM mmol/L 3.4* 3.7  --   --  3.7   < > 2.6*  --  3.1*   CHLORIDE mmol/L 100 100  --   --  95*   < > 90*  --  87*   CO2  mmol/L 25.0 22.0  --   --  21.0*   < > 29.0  --  25.0   BUN mg/dL 24* 24*  --   --  15   < > 11  --  11   CREATININE mg/dL 0.75 0.64  --   --  0.60   < > 0.74   < > 0.84   CALCIUM mg/dL 8.6 8.3*  --   --  8.5*   < > 9.3  --  10.2   BILIRUBIN mg/dL  --   --   --   --   --   --  0.6  --   --    ALK PHOS U/L  --   --   --   --   --   --  35*  --   --    ALT (SGPT) U/L  --   --   --   --   --   --  25  --  34*   AST (SGOT) U/L  --   --   --   --   --   --  39*  --  43*   GLUCOSE mg/dL 84 86  --   --  82   < > 106*  --  104*    < > = values in this interval not displayed.       Imaging Results (Last 24 Hours)     ** No results found for the last 24 hours. **          Assessment: Varicella-zoster encephalitis.  She is doing quite well.  Continue acyclovir.      Plan:  Continue acyclovir.    Comment:  Ambulate if possible.         I discussed the patients findings and my recommendations with patient    Diego Jeronimo MD  09/14/22  11:40 EDT

## 2022-09-14 NOTE — PLAN OF CARE
Goal Outcome Evaluation:  Plan of Care Reviewed With: patient        Progress: improving  Outcome Evaluation: Pt very motivated to participate in physical therapy session. Pt demonstrating small improvements in functional mobility although grossly limited this session d/t significant decrease in BP upon sitting on BSC (77/53 in sitting, 133/71 in supine), with complaints of light headedness and nausea. Pt performed bed mobility with CGA, STS and stand pivot transfers with MinAx1 and RW, and ambulated 2ft from bed to BSC with MinAx1 and RW. PT continuing to recommend inpatient rehab at [place].

## 2022-09-14 NOTE — PROGRESS NOTES
"[facility] Progress Note    Date of Admission: 2022      Antibiotics:  Ceftriaxone 2 g IV every 12 hours    CC:   Chief Complaint   Patient presents with   • Stroke   Confusion    S: Patient is feeling somewhat better. Working with PT. Still weak. No headache or neck pain. No fevers. Tolerating acyclovir well without ADRs. No rashes. No vision or new neurologic changes reported. No SOA or cough.    O:  /68 (BP Location: Right arm, Patient Position: Lying)   Pulse 73   Temp 97.6 °F (36.4 °C) (Oral)   Resp 18   Ht 162 cm (63.78\")   Wt 59.4 kg (131 lb)   SpO2 96%   BMI 22.64 kg/m²   Temp (24hrs), Av.8 °F (36.6 °C), Min:96.7 °F (35.9 °C), Max:98.5 °F (36.9 °C)      PE:   GEN: Awake and alert.  No acute distress.  Does appear frail.  In bed.  HEENT: NCAT.  No external oral lesions noted.  No conjunctival hemorrhages.  No scleral icterus.  No oral lesions noted.  NECK: Supple without nuchal rigidity  HEART: RRR; No murmur, rubs, gallops.   LUNGS: CTA B.. Normal respiratory effort.  ABDOMEN: Soft, nontender, nondistended. No rebound or guarding.   EXT:  No cyanosis, clubbing or edema  MSK: No joint effusions noted.  SKIN: No generalized rashes noted.  No skin lesions noted.  No peripheral stigmata of infective endocarditis noted.  NEURO: Oriented to PPT.  Some chronic right leg weakness.     Laboratory Data    Results from last 7 days   Lab Units 22  0547 22  0651   WBC 10*3/mm3 7.33 6.7  7.04   HEMOGLOBIN g/dL 11.6* 11.8*  11.8   HEMATOCRIT % 35.7 35.3  36.7   PLATELETS 10*3/mm3 158 160  160     Results from last 7 days   Lab Units 22  0502   SODIUM mmol/L 135*   POTASSIUM mmol/L 3.4*   CHLORIDE mmol/L 100   CO2 mmol/L 25.0   BUN mg/dL 24*   CREATININE mg/dL 0.75   GLUCOSE mg/dL 84   CALCIUM mg/dL 8.6     Results from last 7 days   Lab Units 09/10/22  0437   ALK PHOS U/L 35*   BILIRUBIN mg/dL 0.6   ALT (SGPT) U/L 25   AST (SGOT) U/L 39*     Results from last 7 days   Lab Units " 09/11/22  0540   SED RATE mm/hr 10     Results from last 7 days   Lab Units 09/11/22  0540   CRP mg/dL <0.30       Estimated Creatinine Clearance: 60.8 mL/min (by C-G formula based on SCr of 0.75 mg/dL).      Microbiology:  Blood cultures negative respiratory viral panel negative      Radiology:  Imaging Results (Last 24 Hours)     ** No results found for the last 24 hours. 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 - due to above. improving  - Chronic back pain, recent epdiural injections.  MRI not indicative of active infection.  - Lactic acidosis, improved  - Hyponatremia  - Hypokalemia  - Rheumatoid arthritis/on Arava, prednisone.  Started Kevzara 2 weeks prior to arrival with 2nd injection prior to admission. Was on orenzia.  - Immunocompromised host  - Essential hypertension  - H/o supraventricular tachycardia.     ASSESSMENT: 78 yo female with h/o RA/on Arava, prednisone, and recently started Kevzara injections 2 weeks prior with 2nd dose prior to admission, HTN, and SVT who was admitted for altered mental status.  She was lucent after arrival.  A CVA work up was negative.  LP done and CSF consistent with VZV meningitis/encephalitis.        PLAN/RECOMMENDATIONS:     If she continues to improve then possible discharge to inpatient PT later this week with the below plan    UM/DEBBIE:  Continue off antibiotics    Continue Acyclovir 10mg/kg IV q8h. Plan for a 10 day course for treatment of her   VZV meningitis in the setting of her IC status. If she continues to improve then we could transition her to Valtrex 1g PO TID at time of discharge for completion of her treatment course. High dose PO Valtrex has good bioavailability. This is in an effort to spare her a PICC line.    Ok to discontinue airborne precautions. Unlikely to have pulmonary involvement in this case and unlikely airborne despite her IC  status. I have discussed this with Dr. Mynor Tyson who is in agreement.    Given she will likely be on immunosuppression with an agent such as Kefzara after completion of her treatment for VZV meningitis, she will have significant risk for reactivation/recurrences. I think we should transition her to chronic suppressive valtrex 500 mg PO BID after completion of her treatment course.    I would be happy to see the patient in my clinic within 2 week of her discharge.      I discussed in length with the patient and her sister at bedside today        9/14/2022

## 2022-09-15 LAB
1,3 BETA GLUCAN SER-MCNC: 125 PG/ML
BACTERIA SPEC AEROBE CULT: NORMAL
BLASTOMYCES AG UR IA-MCNC: NORMAL NG/ML
GRAM STN SPEC: NORMAL
GRAM STN SPEC: NORMAL
H CAPSUL AG SER QL IA: <0.5
MAGNESIUM SERPL-MCNC: 2.2 MG/DL (ref 1.6–2.4)
POTASSIUM SERPL-SCNC: 3.6 MMOL/L (ref 3.5–5.2)
REAGIN AB CSF QL: NON REACTIVE
SPECIMEN SOURCE: NORMAL

## 2022-09-15 PROCEDURE — 25010000002 ONDANSETRON PER 1 MG: Performed by: STUDENT IN AN ORGANIZED HEALTH CARE EDUCATION/TRAINING PROGRAM

## 2022-09-15 PROCEDURE — 99232 SBSQ HOSP IP/OBS MODERATE 35: CPT | Performed by: INTERNAL MEDICINE

## 2022-09-15 PROCEDURE — 84132 ASSAY OF SERUM POTASSIUM: CPT | Performed by: INTERNAL MEDICINE

## 2022-09-15 PROCEDURE — 97535 SELF CARE MNGMENT TRAINING: CPT

## 2022-09-15 PROCEDURE — 83735 ASSAY OF MAGNESIUM: CPT | Performed by: INTERNAL MEDICINE

## 2022-09-15 PROCEDURE — 25010000002 ACYCLOVIR PER 5 MG: Performed by: STUDENT IN AN ORGANIZED HEALTH CARE EDUCATION/TRAINING PROGRAM

## 2022-09-15 PROCEDURE — 97530 THERAPEUTIC ACTIVITIES: CPT

## 2022-09-15 PROCEDURE — 63710000001 PREDNISONE PER 5 MG: Performed by: NURSE PRACTITIONER

## 2022-09-15 RX ADMIN — POTASSIUM CHLORIDE 40 MEQ: 750 CAPSULE, EXTENDED RELEASE ORAL at 18:30

## 2022-09-15 RX ADMIN — ACYCLOVIR SODIUM 620 MG: 500 INJECTION, SOLUTION INTRAVENOUS at 20:18

## 2022-09-15 RX ADMIN — MULTIPLE VITAMINS W/ MINERALS TAB 1 TABLET: TAB at 08:35

## 2022-09-15 RX ADMIN — Medication 2000 UNITS: at 08:35

## 2022-09-15 RX ADMIN — ACETAMINOPHEN 650 MG: 325 TABLET, FILM COATED ORAL at 14:56

## 2022-09-15 RX ADMIN — ASPIRIN 325 MG ORAL TABLET 325 MG: 325 PILL ORAL at 08:35

## 2022-09-15 RX ADMIN — Medication 10 ML: at 20:19

## 2022-09-15 RX ADMIN — LORAZEPAM 0.5 MG: 0.5 TABLET ORAL at 20:18

## 2022-09-15 RX ADMIN — AMLODIPINE BESYLATE 5 MG: 5 TABLET ORAL at 08:35

## 2022-09-15 RX ADMIN — METOPROLOL TARTRATE 50 MG: 50 TABLET, FILM COATED ORAL at 08:35

## 2022-09-15 RX ADMIN — PREDNISONE 5 MG: 5 TABLET ORAL at 08:35

## 2022-09-15 RX ADMIN — ONDANSETRON 4 MG: 2 INJECTION INTRAMUSCULAR; INTRAVENOUS at 20:36

## 2022-09-15 RX ADMIN — ACYCLOVIR SODIUM 620 MG: 500 INJECTION, SOLUTION INTRAVENOUS at 05:35

## 2022-09-15 RX ADMIN — ACYCLOVIR SODIUM 620 MG: 500 INJECTION, SOLUTION INTRAVENOUS at 14:24

## 2022-09-15 RX ADMIN — ACETAMINOPHEN 650 MG: 325 TABLET, FILM COATED ORAL at 08:38

## 2022-09-15 RX ADMIN — POTASSIUM CHLORIDE 40 MEQ: 750 CAPSULE, EXTENDED RELEASE ORAL at 12:12

## 2022-09-15 RX ADMIN — ATORVASTATIN CALCIUM 80 MG: 40 TABLET, FILM COATED ORAL at 20:18

## 2022-09-15 RX ADMIN — METOPROLOL TARTRATE 50 MG: 50 TABLET, FILM COATED ORAL at 20:18

## 2022-09-15 RX ADMIN — LORAZEPAM 0.5 MG: 0.5 TABLET ORAL at 04:03

## 2022-09-15 NOTE — CASE MANAGEMENT/SOCIAL WORK
Continued Stay Note  [facility]     Patient Name: Vane Villavicencio  MRN: [id number]  Today's Date: 9/15/2022    Admit Date: 9/9/2022     Discharge Plan     Row Name 09/15/22 1142       Plan    Plan [facility]    Patient/Family in Agreement with Plan yes    Plan Comments Spoke with April at [facility] and she is working on a bed for Friday 9/16. Spoke with patient and family at bedside. Plan is [facility] tomorrow. Family will transport. Patient and family are in agreement with the plan. CM will continue to follow.    Final Discharge Disposition Code 62 - inpatient rehab facility               Discharge Codes    No documentation.               Expected Discharge Date and Time     Expected Discharge Date Expected Discharge Time    Sep 16, 2022             Perico Turner RN

## 2022-09-15 NOTE — PROGRESS NOTES
[facility] Medicine Services  PROGRESS NOTE    Patient Name: Vane Villavicencio  : 1945  MRN: [id number]    Date of Admission: 2022  Primary Care Physician: Rolando Jeronimo MD    Subjective   Subjective     CC:  Follow-up VZV encephalitis    HPI:  Doing well today, worked with OT.  Limited by right thigh pain (chronic).  Continues to tolerate acyclovir.  Orthostatics improved with IVF's    ROS:  Gen- No fevers, chills  CV- No chest pain, palpitations  Resp- No cough, dyspnea  GI- No N/V/D, abd pain    Objective   Objective     Vital Signs:   Temp:  [97.6 °F (36.4 °C)-98.4 °F (36.9 °C)] 97.7 °F (36.5 °C)  Heart Rate:  [73-81] 75  Resp:  [16-18] 16  BP: (133-154)/(68-79) 149/78     Physical Exam:  Constitutional: Awake, alert, ambulating with OT and assisted back into bed, no acute distress  HENT: NCAT, mucous membranes moist  Respiratory: Clear to auscultation bilaterally, respiratory effort normal   Cardiovascular: RRR, no murmurs, rubs, or gallops  Gastrointestinal: Positive bowel sounds, soft, nontender, nondistended, Ortiz catheter in place  Musculoskeletal: No bilateral ankle edema  Psychiatric: Appropriate affect, cooperative  Neurologic: Oriented x 3, strength symmetric in all extremities, speech clear and fluent    Results Reviewed:  LAB RESULTS:      Lab 22  0547 22  0651 22  0540 09/10/22  0437 22  2325 22  2116 22  1818 22  1817   WBC 7.33 6.7  7.04 6.17 5.91  --   --   --  7.11   HEMOGLOBIN 11.6* 11.8*  11.8 11.4* 11.9*  --   --   --  13.6   HEMOGLOBIN, POC  --   --   --   --   --   --  15.0  --    HEMATOCRIT 35.7 35.3  36.7 34.2 34.9  --   --   --  40.0   HEMATOCRIT POC  --   --   --   --   --   --  44  --    PLATELETS 158 160  160 165 151  --   --   --  163   NEUTROS ABS 1.90 2.46  1.7 2.59 1.24*  --   --   --  2.24   IMMATURE GRANS (ABS) 0.02  --   --   --   --   --   --  0.02   LYMPHS ABS 3.70* 3.6*  --   --   --   --    Received refill request from [facility] for FREESTYLE INSULINX TEST test strip  Last refill: 4/16/19 #100 R-3  Last office visit: 12/9/2019        Per  protocol medication refilled for a 3 month supply and E-prescribed to pharmacy.       --  3.51*   MONOS ABS 1.36* 1.1*  --   --   --   --   --  1.19*   EOS ABS 0.25 0.21  0.2 0.00 0.12  --   --   --  0.04   MCV 87.5 85.5  87 85.7 83.7  --   --   --  84.6   SED RATE  --   --  10  --   --   --   --   --    CRP  --   --  <0.30  --   --   --   --   --    PROCALCITONIN  --   --   --   --   --  0.03  --   --    LACTATE  --   --   --  1.7 2.8*  --   --   --    PROTIME  --   --   --   --   --   --  11.9*  --    APTT  --   --   --   --   --   --   --  28.8         Lab 09/14/22  0502 09/13/22  0547 09/13/22  0016 09/12/22  1849 09/12/22  1500 09/12/22  0748 09/12/22  0651 09/11/22  1038 09/11/22  0540 09/11/22  0017 09/10/22  1425 09/10/22  0437 09/09/22  1818 09/09/22  1817   SODIUM 135* 134* 132* 130* 133*   < > 129*   < > 134*  135*  --    < > 133*  133*  --  133*   POTASSIUM 3.4* 3.7  --   --   --   --  3.7  --  3.7  3.7 4.2  --  2.6*  --  3.1*   CHLORIDE 100 100  --   --   --   --  95*  --  99  --   --  90*  --  87*   CO2 25.0 22.0  --   --   --   --  21.0*  --  23.0  --   --  29.0  --  25.0   ANION GAP 10.0 12.0  --   --   --   --  13.0  --  12.0  --   --  14.0  --  21.0*   BUN 24* 24*  --   --   --   --  15  --  10  --   --  11  --  11   CREATININE 0.75 0.64  --   --   --   --  0.60  --  0.56*  --   --  0.74   < > 0.84   EGFR 83.1 92.3  --   --   --   --  93.7  --  95.3  --   --  84.5   < > 72.6   GLUCOSE 84 86  --   --   --   --  82  --  93  --   --  106*  --  104*   CALCIUM 8.6 8.3*  --   --   --   --  8.5*  --  8.8  --   --  9.3  --  10.2   MAGNESIUM 1.8  --   --   --   --   --   --   --   --   --   --  1.6  --  1.6   HEMOGLOBIN A1C  --   --   --   --   --   --   --   --   --   --   --  5.90*  --   --    TSH  --   --   --   --   --   --   --   --   --   --   --   --   --  2.070    < > = values in this interval not displayed.         Lab 09/10/22  0437 09/09/22  1817   TOTAL PROTEIN 6.4  --    ALBUMIN 3.90  --    GLOBULIN 2.5  --    ALT (SGPT) 25 34*   AST (SGOT) 39* 43*   BILIRUBIN 0.6  --     ALK PHOS 35*  --          Lab 09/09/22  2116 09/09/22  1818 09/09/22  1817   TROPONIN T <0.010  --  <0.010   PROTIME  --  11.9*  --    INR  --  1.0  --          Lab 09/10/22  0437   CHOLESTEROL 228*   LDL CHOL 131*   HDL CHOL 57   TRIGLYCERIDES 225*         Lab 09/10/22  0437   FOLATE 11.50   VITAMIN B 12 1,143*         Brief Urine Lab Results  (Last result in the past 365 days)      Color   Clarity   Blood   Leuk Est   Nitrite   Protein   CREAT   Urine HCG        09/09/22 1930 Yellow   Clear   Negative   Negative   Negative   Negative                 Microbiology Results Abnormal     Procedure Component Value - Date/Time    Blood Culture - Blood, Hand, Right [[id number]]  (Normal) Collected: 09/10/22 0437    Lab Status: Final result Specimen: Blood from Hand, Right Updated: 09/15/22 0717     Blood Culture No growth at 5 days    Blood Culture - Blood, Arm, Left [[id number]]  (Normal) Collected: 09/10/22 0437    Lab Status: Final result Specimen: Blood from Arm, Left Updated: 09/15/22 0717     Blood Culture No growth at 5 days    Histoplasma Ag Ur - Urine, Urine, Clean Catch [[id number]] Collected: 09/11/22 1122    Lab Status: Final result Specimen: Urine, Clean Catch Updated: 09/14/22 1612     Histoplasma Galactomannan Ag Ur <0.5    Narrative:      Test(s) 183562-Histoplasma Gal'chapito Ag Ur  was developed and its performance characteristics determined  by [facility]. It has not been cleared or approved by the Food  and Drug Administration.  Performed at:  [address]  [id number]  : Guillermina Nowak MD, Phone:  [phone]    Culture, CSF - Cerebrospinal Fluid, Lumbar Puncture [[id number]] Collected: 09/12/22 1327    Lab Status: Preliminary result Specimen: Cerebrospinal Fluid from Lumbar Puncture Updated: 09/14/22 1217     CSF Culture No growth at 2 days     Gram Stain Many (4+) WBCs seen      No organisms seen    Fungus Smear - Cerebrospinal Fluid, Lumbar Puncture  [[id number]] Collected: 09/12/22 1327    Lab Status: Final result Specimen: Cerebrospinal Fluid from Lumbar Puncture Updated: 09/14/22 0953     Fungal Stain No fungal elements seen     Comment: SMEAR READ BY PATHOLOGIST       AFB Culture - Cerebrospinal Fluid, Lumbar Puncture [[id number]] Collected: 09/12/22 1327    Lab Status: Preliminary result Specimen: Cerebrospinal Fluid from Lumbar Puncture Updated: 09/13/22 1347     AFB Stain No acid fast bacilli seen on direct smear    Cryptococcal AG, CSF - Cerebrospinal Fluid, Lumbar Puncture [[id number]]  (Normal) Collected: 09/12/22 1327    Lab Status: Final result Specimen: Cerebrospinal Fluid from Lumbar Puncture Updated: 09/13/22 1050     Cryptococcal Antigen, CSF Negative    S. Pneumo Ag Urine or CSF - Urine, Urine, Clean Catch [[id number]]  (Normal) Collected: 09/13/22 0422    Lab Status: Final result Specimen: Urine, Clean Catch Updated: 09/13/22 0934     Strep Pneumo Ag Negative    Respiratory Panel PCR w/COVID-19(SARS-CoV-2) TITA/HELENA/BRANDY/PAD/COR/MAD/RISA In-House, NP Swab in UTM/VTM, 3-4 HR TAT - Swab, Nasopharynx [[id number]]  (Normal) Collected: 09/10/22 0506    Lab Status: Final result Specimen: Swab from Nasopharynx Updated: 09/10/22 0554     ADENOVIRUS, PCR Not Detected     Coronavirus 229E Not Detected     Coronavirus HKU1 Not Detected     Coronavirus NL63 Not Detected     Coronavirus OC43 Not Detected     COVID19 Not Detected     Human Metapneumovirus Not Detected     Human Rhinovirus/Enterovirus Not Detected     Influenza A PCR Not Detected     Influenza B PCR Not Detected     Parainfluenza Virus 1 Not Detected     Parainfluenza Virus 2 Not Detected     Parainfluenza Virus 3 Not Detected     Parainfluenza Virus 4 Not Detected     RSV, PCR Not Detected     Bordetella pertussis pcr Not Detected     Bordetella parapertussis PCR Not Detected     Chlamydophila pneumoniae PCR Not Detected     Mycoplasma pneumo by PCR Not Detected    Narrative:      In the setting of a  positive respiratory panel with a viral infection PLUS a negative procalcitonin without other underlying concern for bacterial infection, consider observing off antibiotics or discontinuation of antibiotics and continue supportive care. If the respiratory panel is positive for atypical bacterial infection (Bordetella pertussis, Chlamydophila pneumoniae, or Mycoplasma pneumoniae), consider antibiotic de-escalation to target atypical bacterial infection.    COVID PRE-OP / PRE-PROCEDURE SCREENING ORDER (NO ISOLATION) - Swab, Nasopharynx [[id number]]  (Normal) Collected: 09/09/22 2115    Lab Status: Final result Specimen: Swab from Nasopharynx Updated: 09/09/22 2146    Narrative:      The following orders were created for panel order COVID PRE-OP / PRE-PROCEDURE SCREENING ORDER (NO ISOLATION) - Swab, Nasopharynx.  Procedure                               Abnormality         Status                     ---------                               -----------         ------                     COVID-19 and FLU A/B PCR...[[id number]]  Normal              Final result                 Please view results for these tests on the individual orders.    COVID-19 and FLU A/B PCR - Swab, Nasopharynx [[id number]]  (Normal) Collected: 09/09/22 2115    Lab Status: Final result Specimen: Swab from Nasopharynx Updated: 09/09/22 2146     COVID19 Not Detected     Influenza A PCR Not Detected     Influenza B PCR Not Detected    Narrative:      Fact sheet for providers: https://www.fda.gov/media/862769/download    Fact sheet for patients: https://www.fda.gov/media/031673/download    Test performed by PCR.          No radiology results from the last 24 hrs    Results for orders placed during the hospital encounter of 09/09/22    Adult Transthoracic Echo Complete W/ Cont if Necessary Per Protocol (With Agitated Saline)    Interpretation Summary  · Left ventricular systolic function is hyperdynamic (EF > 70%).  · Left ventricular diastolic function is  consistent with (grade I) impaired relaxation.  · The cardiac valves are anatomically and functionally normal.  · Nondiagnostic bubble study      I have reviewed the medications:  Scheduled Meds:acyclovir, 10 mg/kg, Intravenous, Q8H  amLODIPine, 5 mg, Oral, Q24H  aspirin, 325 mg, Oral, Daily   Or  aspirin, 300 mg, Rectal, Daily  atorvastatin, 80 mg, Oral, Nightly  cholecalciferol, 2,000 Units, Oral, Daily  metoprolol tartrate, 50 mg, Oral, Q12H  multivitamin with minerals, 1 tablet, Oral, Daily  predniSONE, 5 mg, Oral, Daily  sodium chloride, 10 mL, Intravenous, Q12H      Continuous Infusions:   PRN Meds:.•  acetaminophen  •  artificial tears  •  fluticasone  •  LORazepam  •  magnesium sulfate **OR** magnesium sulfate **OR** magnesium sulfate  •  ondansetron  •  potassium chloride **OR** potassium chloride **OR** potassium chloride  •  potassium phosphate infusion greater than 15 mMoles **OR** potassium phosphate infusion greater than 15 mMoles **OR** potassium phosphate **OR** sodium phosphate IVPB **OR** sodium phosphate IVPB **OR** sodium phosphate IVPB  •  sodium chloride  •  sodium chloride    Assessment & Plan   Assessment & Plan     Active Hospital Problems    Diagnosis  POA   • **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        Brief Hospital Course to date:  Vane Villavicencio is a 77 y.o. female w/ RA on leflunomide/chronic prednisone, Hx SVT, HTN/HLD, hypothyroidism who was brought to the hospital for evaluation of alteration in mental status and initially admitted as a code stroke; initial stroke eval did not demonstrate evidence for acute stroke, UA/UDS was not significantly contributory, she underwent LP 9/12 with meningitis PCR returning positive  for varicella zoster, mentation has improved with IV acyclovir    Assessment/plan    Varicella-zoster encephalitis  Acute metabolic encephalopathy, improving  Immunocompromised patient  - Initial stroke work-up unrevealing  -LP 9/12, lymphocytic predominant CSF, meningitis panel positive for VZV  -Per Dr. Miguel A Tyson: Acyclovir 10 mg/kg IV q8h - 10-day course of therapy (approx comp 9/22?);  Can complete remaining duration with Valtrex 1g PO TID at DC; anticipate long-term suppressive therapy Valtrex 500 mg PO BID thereafter 2/2 ongoing immunocompromise state    Orthostatic hypotension, improved  -Poor oral intake for multiple days, improved with IVF    Rheumatoid arthritis on chronic immunosuppressive therapy  - Recently started Kevzara 2 weeks PTA, previously on Orencia  - On chronic prednisone, leflunomide  -Continue home prednisone  -Follows with rheumatology [facility]    HTN  HLD  -Amlodipine, aspirin, atorvastatin, Lopressor    LBP  Urinary retention  -S/p recent steroid injection  - MRI L-spine w/ disc protrusion and compression of cauda equina nerve roots  -Ortiz catheter placed 9/10, retention not likely to improve until spinal compression resolved  -Seen by NSGY, rec Tx acute infxn and follow-up in the clinic after recovery      Expected Discharge Location and Transportation: [place], family to transport  Expected Discharge Date: 9/16    DVT prophylaxis:  Mechanical DVT prophylaxis orders are present.     AM-PAC 6 Clicks Score (PT): 15 (09/14/22 1020)    CODE STATUS:   Code Status and Medical Interventions:   Ordered at: 09/09/22 6040     Level Of Support Discussed With:    Patient     Code Status (Patient has no pulse and is not breathing):    CPR (Attempt to Resuscitate)     Medical Interventions (Patient has pulse or is breathing):    Full Support       Omar De Jesus, DO  09/15/22               none

## 2022-09-15 NOTE — THERAPY TREATMENT NOTE
Patient Name: Vane Villavicencio  : 1945    MRN: [id number]                              Today's Date: 9/15/2022       Admit Date: 2022    Visit Dx:     ICD-10-CM ICD-9-CM   1. Altered mental status, unspecified altered mental status type  R41.82 780.97   2. Hypokalemia  E87.6 276.8     Patient Active Problem List   Diagnosis   • Supraventricular tachycardia (HCC)   • Rheumatoid arthritis (HCC)   • Hypercholesterolemia   • Benign hypertension   • Abnormal stress test   • Metabolic encephalopathy   • Hypokalemia   • Varicella encephalitis   • Immunocompromised (HCC)     Past Medical History:   Diagnosis Date   • Asthma    • Benign hypertension    • Disease of thyroid gland    • Family history of heart disease    • Family history of heart disease    • Hypercholesterolemia    • Rheumatoid arthritis (HCC)    • Rheumatoid arthritis (HCC)    • Supraventricular tachycardia (HCC)     first diagnosed 2012   • Supraventricular tachycardia (HCC)      Past Surgical History:   Procedure Laterality Date   • CARDIAC CATHETERIZATION N/A 7/10/2020    Procedure: Left Heart Cath;  Surgeon: Pankaj Pollard MD;  Location: [facility] CATH INVASIVE LOCATION;  Service: Cardiology;  Laterality: N/A;   • SINUS SURGERY        General Information     Row Name 09/15/22 1601          OT Time and Intention    Document Type therapy note (daily note)  -MARY     Mode of Treatment occupational therapy  -MARY     Row Name 09/15/22 1601          General Information    Patient Profile Reviewed yes  -MARY     Existing Precautions/Restrictions fall;other (see comments)  Ortiz catheter  -MARY     Barriers to Rehab medically complex  -MARY     Row Name 09/15/22 1601          Cognition    Orientation Status (Cognition) oriented x 3  -MARY     Row Name 09/15/22 1601          Safety Issues, Functional Mobility    Safety Issues Affecting Function (Mobility) awareness of need for assistance;insight into deficits/self-awareness;judgment;problem-solving;safety  precaution awareness;safety precautions follow-through/compliance;sequencing abilities  -     Impairments Affecting Function (Mobility) balance;cognition;coordination;endurance/activity tolerance;pain;postural/trunk control;strength  -MARY     Cognitive Impairments, Mobility Safety/Performance awareness, need for assistance;insight into deficits/self-awareness;judgment;problem-solving/reasoning;safety precaution awareness;safety precaution follow-through;sequencing abilities  -     Comment, Safety Issues/Impairments (Mobility) Increased BLE pain with weightbearing  -           User Key  (r) = Recorded By, (t) = Taken By, (c) = Cosigned By    Initials Name Provider Type    MARY Marsha Modi OT Occupational Therapist                 Mobility/ADL's     Row Name 09/15/22 1602          Bed Mobility    Bed Mobility sit-supine  -     Sit-Supine [place] (Bed Mobility) minimum assist (75% patient effort);verbal cues  -     Bed Mobility, Safety Issues impaired trunk control for bed mobility;decreased use of legs for bridging/pushing  -     Assistive Device (Bed Mobility) bed rails;head of bed elevated  -     Comment, (Bed Mobility) Assist to bring BLE's onto bed surface  -     Row Name 09/15/22 1602          Transfers    Transfers sit-stand transfer;stand-sit transfer  -     Comment, (Transfers) STSx2 with cues for HP and sequencing; returned to bed from chair  -     Bed-Chair [place] (Transfers) minimum assist (75% patient effort);verbal cues;1 person assist  -     Assistive Device (Bed-Chair Transfers) walker, front-wheeled  -     Sit-Stand [place] (Transfers) contact guard;verbal cues  -     Row Name 09/15/22 1602          Sit-Stand Transfer    Assistive Device (Sit-Stand Transfers) walker, front-wheeled  -[facility] Name 09/15/22 1602          Functional Mobility    Functional Mobility- Comment declined d/t increased pain in standing  -     Row Name 09/15/22 1602           Activities of Daily Living    BADL Assessment/Intervention grooming;lower body dressing  -[facility] Name 09/15/22 1602          Grooming Assessment/Training    [place] Level (Grooming) hair care, combing/brushing;wash face, hands;set up  -MARY     Position (Grooming) supported sitting  -[facility] Name 09/15/22 1602          Lower Body Dressing Assessment/Training    [place] Level (Lower Body Dressing) don;socks;set up  -MARY     Position (Lower Body Dressing) supported sitting  -MARY           User Key  (r) = Recorded By, (t) = Taken By, (c) = Cosigned By    Initials Name Provider Type    Marsha Bravo OT Occupational Therapist               Obj/Interventions     [facility] Name 09/15/22 1606          Balance    Balance Interventions standing;weight shifting activity  -MARY     Comment, Balance Pt demonstrated minimal tolerance to standing lateral weight shift activity, limited by increased pain.  -MARY           User Key  (r) = Recorded By, (t) = Taken By, (c) = Cosigned By    Initials Name Provider Type    Marsha Bravo OT Occupational Therapist               Goals/Plan    No documentation.                Clinical Impression     [facility] Name 09/15/22 1607          Pain Assessment    Pain Intervention(s) Ambulation/increased activity;Repositioned;Rest  -[facility] Name 09/15/22 1607          Pain Scale: FACES Pre/Post-Treatment    Pain: FACES Scale, Pretreatment 4-->hurts little more  -MARY     Posttreatment Pain Rating 8-->hurts whole lot  -MARY     Pain Location - Side/Orientation Bilateral  -MARY     Pain Location lower  -     Pain Location - extremity  -[facility] Name 09/15/22 1607          Plan of Care Review    Plan of Care Reviewed With patient  -MARY     Progress no change  -MARY     Outcome Evaluation Pt donned socks with LAZARO seated in chair, completed STS with CGA/cues, participated in lateral weight shift activity standing at RW reporting increased pain, no reports of dizziness with positional changes. Pt tx'd  from chair to bed with Alon/RW, declined ambulation, continues to be limited by decreased standing tolerance.  -MARY     Row Name 09/15/22 1607          Vital Signs    O2 Delivery Pre Treatment room air  -MARY     O2 Delivery Intra Treatment room air  -MARY     O2 Delivery Post Treatment room air  -MARY     Pre Patient Position Sitting  -MARY     Intra Patient Position Standing  -MARY     Post Patient Position Supine  -MARY     Row Name 09/15/22 1607          Positioning and Restraints    Pre-Treatment Position sitting in chair/recliner  -MARY     Post Treatment Position bed  -MARY     In Bed fowlers;with other staff;call light within reach;encouraged to call for assist;exit alarm on  -MARY           User Key  (r) = Recorded By, (t) = Taken By, (c) = Cosigned By    Initials Name Provider Type    Marsha Bravo OT Occupational Therapist               Outcome Measures     Row Name 09/15/22 1612          How much help from another is currently needed...    Putting on and taking off regular lower body clothing? 3  -MARY     Bathing (including washing, rinsing, and drying) 2  -MARY     Toileting (which includes using toilet bed pan or urinal) 3  -MARY     Putting on and taking off regular upper body clothing 3  -MARY     Taking care of personal grooming (such as brushing teeth) 3  -MARY     Eating meals 3  -MARY     AM-PAC 6 Clicks Score (OT) 17  -MARY     Row Name 09/15/22 1612          Functional Assessment    Outcome Measure Options AM-PAC 6 Clicks Daily Activity (OT)  -MARY           User Key  (r) = Recorded By, (t) = Taken By, (c) = Cosigned By    Initials Name Provider Type    Marsha Bravo OT Occupational Therapist                Occupational Therapy Education                 Title: PT OT SLP Therapies (Done)     Topic: Occupational Therapy (Done)     Point: ADL training (Done)     Description:   Instruct learner(s) on proper safety adaptation and remediation techniques during self care or transfers.   Instruct in proper use of assistive  devices.              Learning Progress Summary           Patient Acceptance, E, VU,DU by MARY at 9/15/2022 1612    Comment: Transfer training; benefits of activity    Acceptance, E, VU by MA at 9/13/2022 1533    Acceptance, E, VU,NR by TA at 9/10/2022 1157   Family Acceptance, E, VU by MA at 9/13/2022 1533                   Point: Home exercise program (Done)     Description:   Instruct learner(s) on appropriate technique for monitoring, assisting and/or progressing therapeutic exercises/activities.              Learning Progress Summary           Patient Acceptance, E, VU,NR by TA at 9/10/2022 1157                   Point: Precautions (Done)     Description:   Instruct learner(s) on prescribed precautions during self-care and functional transfers.              Learning Progress Summary           Patient Acceptance, E, VU,DU by MARY at 9/15/2022 1612    Comment: Transfer training; benefits of activity    Acceptance, E, VU by MA at 9/13/2022 1533    Acceptance, E, VU,NR by TA at 9/10/2022 1157   Family Acceptance, E, VU by MA at 9/13/2022 1533                   Point: Body mechanics (Done)     Description:   Instruct learner(s) on proper positioning and spine alignment during self-care, functional mobility activities and/or exercises.              Learning Progress Summary           Patient Acceptance, E, VU,DU by MARY at 9/15/2022 1612    Comment: Transfer training; benefits of activity    Acceptance, E, VU by MA at 9/13/2022 1533    Acceptance, E, VU,NR by TA at 9/10/2022 1157   Family Acceptance, E, VU by MA at 9/13/2022 1533                               User Key     Initials Effective Dates Name Provider Type Discipline    ERIC 06/16/21 -  Jim Jj, OT Occupational Therapist OT    MARY 06/16/21 -  Marsha Modi OT Occupational Therapist OT    MA 11/19/20 -  Marilynn Feldman OT Occupational Therapist OT              OT Recommendation and Plan     Plan of Care Review  Plan of Care Reviewed With:  patient  Progress: no change  Outcome Evaluation: Pt donned socks with LAZARO seated in chair, completed STS with CGA/cues, participated in lateral weight shift activity standing at RW reporting increased pain, no reports of dizziness with positional changes. Pt tx'd from chair to bed with Alon/RW, declined ambulation, continues to be limited by decreased standing tolerance.     Time Calculation:    Time Calculation- OT     Row Name 09/15/22 1430             Time Calculation- OT    OT Start Time 1430  -MARY      OT Received On 09/15/22  -MARY              Timed Charges    31697 - OT Therapeutic Activity Minutes 16  -MARY      52467 - OT Self Care/Mgmt Minutes 8  -MARY              Total Minutes    Timed Charges Total Minutes 24  -MARY       Total Minutes 24  -MARY            User Key  (r) = Recorded By, (t) = Taken By, (c) = Cosigned By    Initials Name Provider Type    Marsha Bravo OT Occupational Therapist              Therapy Charges for Today     Code Description Service Date Service Provider Modifiers Qty    29335346961 HC OT THERAPEUTIC ACT EA 15 MIN 9/15/2022 Marsha Modi OT GO 1    54012813755 HC OT SELF CARE/MGMT/TRAIN EA 15 MIN 9/15/2022 Marsha Modi OT GO 1               Marsha Modi OT  9/15/2022

## 2022-09-15 NOTE — PLAN OF CARE
Goal Outcome Evaluation:  Plan of Care Reviewed With: patient        Progress: no change  Outcome Evaluation: VSS on RA. No acute events overnight. PRN ativan given per patient request to assist with sleep (she takes this at home to sleep). No complaints of pain Will continue with current plan of care

## 2022-09-15 NOTE — PLAN OF CARE
Goal Outcome Evaluation:  Plan of Care Reviewed With: patient        Progress: no change  Outcome Evaluation: Pt donned socks with LAZARO seated in chair, completed STS with CGA/cues, participated in lateral weight shift activity standing at RW reporting increased pain, no reports of dizziness with positional changes. Pt tx'd from chair to bed with Alon/RW, declined ambulation, continues to be limited by decreased standing tolerance.   18

## 2022-09-16 VITALS
TEMPERATURE: 98 F | WEIGHT: 138 LBS | HEART RATE: 75 BPM | OXYGEN SATURATION: 97 % | DIASTOLIC BLOOD PRESSURE: 91 MMHG | SYSTOLIC BLOOD PRESSURE: 154 MMHG | HEIGHT: 64 IN | BODY MASS INDEX: 23.56 KG/M2 | RESPIRATION RATE: 18 BRPM

## 2022-09-16 PROBLEM — G93.41 METABOLIC ENCEPHALOPATHY: Status: RESOLVED | Noted: 2022-09-09 | Resolved: 2022-09-16

## 2022-09-16 PROCEDURE — 99231 SBSQ HOSP IP/OBS SF/LOW 25: CPT | Performed by: PSYCHIATRY & NEUROLOGY

## 2022-09-16 PROCEDURE — 25010000002 ACYCLOVIR PER 5 MG: Performed by: STUDENT IN AN ORGANIZED HEALTH CARE EDUCATION/TRAINING PROGRAM

## 2022-09-16 PROCEDURE — 63710000001 PREDNISONE PER 5 MG: Performed by: NURSE PRACTITIONER

## 2022-09-16 PROCEDURE — 99239 HOSP IP/OBS DSCHRG MGMT >30: CPT | Performed by: NURSE PRACTITIONER

## 2022-09-16 RX ORDER — VALACYCLOVIR HYDROCHLORIDE 500 MG/1
500 TABLET, FILM COATED ORAL 2 TIMES DAILY
Qty: 60 TABLET | Refills: 0 | Status: ON HOLD | OUTPATIENT
Start: 2022-09-23 | End: 2022-10-20 | Stop reason: SDUPTHER

## 2022-09-16 RX ORDER — ACYCLOVIR 400 MG/1
1000 TABLET ORAL 3 TIMES DAILY
Qty: 45 TABLET | Refills: 0 | Status: SHIPPED | OUTPATIENT
Start: 2022-09-16 | End: 2022-09-16 | Stop reason: HOSPADM

## 2022-09-16 RX ORDER — ACYCLOVIR 400 MG/1
400 TABLET ORAL 2 TIMES DAILY
Qty: 60 TABLET | Refills: 0 | Status: SHIPPED | OUTPATIENT
Start: 2022-09-23 | End: 2022-09-16 | Stop reason: HOSPADM

## 2022-09-16 RX ORDER — ATORVASTATIN CALCIUM 80 MG/1
80 TABLET, FILM COATED ORAL NIGHTLY
Qty: 90 TABLET
Start: 2022-09-16

## 2022-09-16 RX ORDER — PREDNISONE 1 MG/1
5 TABLET ORAL DAILY
Start: 2022-09-17

## 2022-09-16 RX ORDER — AMLODIPINE BESYLATE 5 MG/1
5 TABLET ORAL
Start: 2022-09-17

## 2022-09-16 RX ORDER — VALACYCLOVIR HYDROCHLORIDE 1 G/1
1000 TABLET, FILM COATED ORAL 3 TIMES DAILY
Qty: 4 TABLET | Refills: 0 | Status: SHIPPED | OUTPATIENT
Start: 2022-09-16 | End: 2022-09-22

## 2022-09-16 RX ORDER — METOPROLOL TARTRATE 50 MG/1
50 TABLET, FILM COATED ORAL EVERY 12 HOURS SCHEDULED
Start: 2022-09-16

## 2022-09-16 RX ORDER — LEFLUNOMIDE 20 MG/1
20 TABLET ORAL DAILY
Start: 2022-09-23

## 2022-09-16 RX ORDER — ACETAMINOPHEN 325 MG/1
650 TABLET ORAL EVERY 6 HOURS PRN
Start: 2022-09-16

## 2022-09-16 RX ORDER — ASPIRIN 325 MG
325 TABLET ORAL DAILY
Start: 2022-09-17

## 2022-09-16 RX ORDER — LEFLUNOMIDE 20 MG/1
20 TABLET ORAL DAILY
Start: 2022-09-20 | End: 2022-09-16 | Stop reason: SDUPTHER

## 2022-09-16 RX ADMIN — Medication 2000 UNITS: at 09:46

## 2022-09-16 RX ADMIN — ACETAMINOPHEN 650 MG: 325 TABLET, FILM COATED ORAL at 12:54

## 2022-09-16 RX ADMIN — MULTIPLE VITAMINS W/ MINERALS TAB 1 TABLET: TAB at 09:46

## 2022-09-16 RX ADMIN — ASPIRIN 325 MG ORAL TABLET 325 MG: 325 PILL ORAL at 09:46

## 2022-09-16 RX ADMIN — PREDNISONE 5 MG: 5 TABLET ORAL at 09:46

## 2022-09-16 RX ADMIN — ACYCLOVIR SODIUM 620 MG: 500 INJECTION, SOLUTION INTRAVENOUS at 05:53

## 2022-09-16 RX ADMIN — METOPROLOL TARTRATE 50 MG: 50 TABLET, FILM COATED ORAL at 09:47

## 2022-09-16 RX ADMIN — Medication 10 ML: at 09:48

## 2022-09-16 RX ADMIN — AMLODIPINE BESYLATE 5 MG: 5 TABLET ORAL at 09:47

## 2022-09-16 NOTE — PLAN OF CARE
Goal Outcome Evaluation:  Progress: no change  Outcome Evaluation: Patient is A&O. NSR on tele. VSS. Remains on room air. PRN ativan given x1. Nausea relieved with PRN zofran. Good UO from [place] overnight. Continue POC.

## 2022-09-16 NOTE — PROGRESS NOTES
Neurology       Patient Care Team:  Rolando Jeronimo MD as PCP - General (Internal Medicine)    Chief complaint:   Chief Complaint   Patient presents with   • Stroke        History: Varicella-zoster encephalitis.  She feels better she feels stronger however what she meant was she was able to sit at the bedside.  With help of 1 person  she was able to transfer to the chair.  However her mentation is  better.  Past Medical History:   Diagnosis Date   • Asthma    • Benign hypertension    • Disease of thyroid gland    • Family history of heart disease    • Family history of heart disease    • Hypercholesterolemia    • Rheumatoid arthritis (HCC)    • Rheumatoid arthritis (HCC)    • Supraventricular tachycardia (HCC)     first diagnosed 06/01/2012   • Supraventricular tachycardia (HCC)        Vital Signs   Vitals:    09/16/22 0007 09/16/22 0416 09/16/22 0747 09/16/22 0946   BP: 157/85 159/85 154/91 154/91   BP Location: Left arm Left arm Left arm    Patient Position: Lying Lying Lying    Pulse: 78 87 93 90   Resp: 16 16 18    Temp: 98.2 °F (36.8 °C) 98.7 °F (37.1 °C) 98 °F (36.7 °C)    TempSrc: Oral Oral Oral    SpO2: 95% 95% 95%    Weight:  62.6 kg (138 lb)     Height:           Physical Exam:   General: Fairly built and nourished.  Oriented x3.  Normal speech.              Neuro: Moves all 4 limbs well.    Results Review:    Results from last 7 days   Lab Units 09/13/22  0547   WBC 10*3/mm3 7.33   HEMOGLOBIN g/dL 11.6*   HEMATOCRIT % 35.7   PLATELETS 10*3/mm3 158     Results from last 7 days   Lab Units 09/15/22  0910 09/14/22  0502 09/13/22  0547 09/13/22  0016 09/12/22  0748 09/12/22  0651 09/10/22  1425 09/10/22  0437 09/09/22  1818 09/09/22  1817   SODIUM mmol/L  --  135* 134* 132*   < > 129*   < > 133*  133*  --  133*   POTASSIUM mmol/L 3.6 3.4* 3.7  --   --  3.7   < > 2.6*  --  3.1*   CHLORIDE mmol/L  --  100 100  --   --  95*   < > 90*  --  87*   CO2 mmol/L  --  25.0 22.0  --   --  21.0*   < > 29.0  --  25.0    BUN mg/dL  --  24* 24*  --   --  15   < > 11  --  11   CREATININE mg/dL  --  0.75 0.64  --   --  0.60   < > 0.74   < > 0.84   CALCIUM mg/dL  --  8.6 8.3*  --   --  8.5*   < > 9.3  --  10.2   BILIRUBIN mg/dL  --   --   --   --   --   --   --  0.6  --   --    ALK PHOS U/L  --   --   --   --   --   --   --  35*  --   --    ALT (SGPT) U/L  --   --   --   --   --   --   --  25  --  34*   AST (SGOT) U/L  --   --   --   --   --   --   --  39*  --  43*   GLUCOSE mg/dL  --  84 86  --   --  82   < > 106*  --  104*    < > = values in this interval not displayed.       Imaging Results (Last 24 Hours)     ** No results found for the last 24 hours. **          Assessment:  Varicella-zoster encephalitis.  Clinically she is doing quite well.  In fact she responded to the first dose of acyclovir very well and she was like a different person.    Plan:  She is being transferred to rehabilitation.  She was started on acyclovir on the night of 12 September.  She will get 20 doses.    Comment:  Neurology will follow as needed.         I discussed the patients findings and my recommendations with patient    Diego Jeronimo MD  09/16/22  11:10 EDT

## 2022-09-16 NOTE — PROGRESS NOTES
"[facility] Progress Note    Date of Admission: 2022      Antibiotics:  Ceftriaxone 2 g IV every 12 hours    CC:   Chief Complaint   Patient presents with   • Stroke   Confusion    S: patient is doing well.  She states that physical therapy has not come by yet today.  No headaches or neck pain.  No new focal weakness.  No fevers.no rashes reported    O:  /90 (BP Location: Left arm, Patient Position: Lying)   Pulse 81   Temp 97.7 °F (36.5 °C) (Oral)   Resp 16   Ht 162 cm (63.78\")   Wt 62.4 kg (137 lb 8 oz)   SpO2 98%   BMI 23.76 kg/m²   Temp (24hrs), Av.7 °F (36.5 °C), Min:96.4 °F (35.8 °C), Max:98.6 °F (37 °C)      PE:   GEN: Awake and alert.  No acute distress. Sitting in a bedside chair  HEENT: NCAT.  No external oral lesions noted.  No conjunctival hemorrhages.  No scleral icterus.  No oral lesions noted.  NECK: Supple without nuchal rigidity  HEART: RRR; No murmur, rubs, gallops.   LUNGS: CTA B.. Normal respiratory effort.  ABDOMEN: Soft, nontender, nondistended. No rebound or guarding.   EXT:  No cyanosis, clubbing or edema  MSK: No joint effusions noted.  SKIN: no new rashes  NEURO: Oriented to PPT.  Some chronic right leg weakness.     Laboratory Data    Results from last 7 days   Lab Units 22  0547 22  0651   WBC 10*3/mm3 7.33 6.7  7.04   HEMOGLOBIN g/dL 11.6* 11.8*  11.8   HEMATOCRIT % 35.7 35.3  36.7   PLATELETS 10*3/mm3 158 160  160     Results from last 7 days   Lab Units 09/15/22  0910 22  0502   SODIUM mmol/L  --  135*   POTASSIUM mmol/L 3.6 3.4*   CHLORIDE mmol/L  --  100   CO2 mmol/L  --  25.0   BUN mg/dL  --  24*   CREATININE mg/dL  --  0.75   GLUCOSE mg/dL  --  84   CALCIUM mg/dL  --  8.6     Results from last 7 days   Lab Units 09/10/22  0437   ALK PHOS U/L 35*   BILIRUBIN mg/dL 0.6   ALT (SGPT) U/L 25   AST (SGOT) U/L 39*     Results from last 7 days   Lab Units 22  0540   SED RATE mm/hr 10     Results from last 7 days   Lab Units 22  0540   CRP " mg/dL <0.30       Estimated Creatinine Clearance: 63.8 mL/min (by C-G formula based on SCr of 0.75 mg/dL).      Microbiology:  Blood cultures negative respiratory viral panel negative      Radiology:  Imaging Results (Last 24 Hours)     ** No results found for the last 24 hours. 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 - due to above. improving  - Chronic back pain, recent epdiural injections.  MRI not indicative of active infection.  - Lactic acidosis, improved  - Hyponatremia  - Hypokalemia  - Rheumatoid arthritis/on Arava, prednisone.  Started Kevzara 2 weeks prior to arrival with 2nd injection prior to admission. Was on orenzia.  - Immunocompromised host  - Essential hypertension  - H/o supraventricular tachycardia.     ASSESSMENT: 76 yo female with h/o RA/on Arava, prednisone, and recently started Kevzara injections 2 weeks prior with 2nd dose prior to admission, HTN, and SVT who was admitted for altered mental status.  She was lucent after arrival.  A CVA work up was negative.  LP done and CSF consistent with VZV meningitis/encephalitis.        PLAN/RECOMMENDATIONS:     Discontinued airborne precautions    If she continues to improve then possible discharge to inpatient PT later this week with the below plan    UM/DEBBIE:  Continue off antibiotics    Continue Acyclovir 10mg/kg IV q8h. Plan for a 10 day course for treatment of her VZV meningitis in the setting of her IC status. If she continues to improve then we could transition her to Valtrex 1g PO TID at time of discharge for completion of her treatment course. High dose PO Valtrex has good bioavailability. This is in an effort to spare her a PICC line.    Given she will likely be on immunosuppression with an agent such as Kefzara after completion of her treatment for VZV meningitis, she will have significant risk for reactivation/recurrences. I  think we should transition her to chronic suppressive valtrex 500 mg PO BID after completion of her treatment course.    I would be happy to see the patient in my clinic within 2 week of her discharge.      I discussed with her relatives at the bedside today    I discussed with nursing today    I discussed with Dr. Lubin (Patient's Rheumatologist) today      9/15/2022

## 2022-09-16 NOTE — PLAN OF CARE
Goal Outcome Evaluation:  Plan of Care Reviewed With: patient           Outcome Evaluation: d/c to [facility]

## 2022-09-16 NOTE — DISCHARGE SUMMARY
[facility]  DISCHARGE SUMMARY    Patient Name: Vane Villavicencio  : 1945  MRN: [id number]    Date of Admission: 2022  Date of Discharge:  2022  Primary Care Physician: Rolando Jeronimo MD    Consults     Date and Time Order Name Status Description    9/10/2022  7:23 AM Inpatient Neurology Consult General Completed     9/10/2022  1:41 AM Inpatient Infectious Diseases Consult Completed     2022  6:01 PM Inpatient Neurology Consult Stroke Completed         Hospital Course     Presenting Problem:   AMS (altered mental status) [R41.82]    Active Hospital Problems    Diagnosis  POA   • **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 female PMH RA on leflunomide, chronic prednisone, history of SVT, HTN/HLD, hypothyroidism presenting to the hospital for evaluation of altered mental status initially admitted as a code stroke.  Stroke evaluation did not demonstrate acute stroke, UA/UDS was not significantly contributory.  She underwent LP  with meningitis PCR returned positive for varicella-zoster, mentation is improved with IV acyclovir.    Discharge Follow Up Recommendations for labs/diagnostics:  - Follow-up with PCP in 1 week after discharge from rehab  - Follow-up with Dr. Jeronimo (Neurology) In 1 week after discharge from rehab  - Follow-up with infectious disease in 4 weeks  - Follow-up with neurosurgery in 3 weeks    Day of Discharge     HPI:   Pt sitting up in the chair, A&OX3, NAD.  Patient reports mild pain to her thighs; otherwise, no issues today.   .  Review of Systems  Gen- No fevers, chills  CV- No chest pain, palpitations  Resp- No  "cough, dyspnea  GI- No N/V/D, abd pain  Otherwise ROS is negative except as mentioned in the HPI.    Vital Signs:   Temp:  [97.7 °F (36.5 °C)-98.7 °F (37.1 °C)] 98 °F (36.7 °C)  Heart Rate:  [] 89  Resp:  [16-18] 18  BP: (141-163)/(73-91) 154/91     Physical Exam:  Constitutional: Awake, alert, NAD  HENT: NCAT, mucous membranes moist  Respiratory: Clear to auscultation bilaterally, nonlabored respirations   Cardiovascular: RRR, no murmurs, rubs, or gallops  Gastrointestinal: Positive bowel sounds, soft, nontender, nondistended  Musculoskeletal: No bilateral ankle edema  Psychiatric: Appropriate affect, cooperative  Neurologic: Oriented x 3, strength symmetric in all extremities, Cranial Nerves grossly intact to confrontation, speech clear  Skin: No rashes    Plan:  Varicella-zoster encephalitis-improving  Acute metabolic encephalopathy-resolved  Immunocompromised patient-ongoing  -Initial stroke work-up unrevealing, LP 9/20 lymphocytic predominant CSF, meningitis panel positive for syncope.  ID, Dr. Tyson consulted.  Recommend acyclovir 2 mg/kg IV every 8 hours, transition to Valtrex 1 g 3 times daily through 9/22, then starting 9/23  Plan to start Valtrex 5 mg twice daily for long-term suppressive therapy.    Orthostatic hypotension-resolved    Rheumatoid arthritis on chronic immunosuppressive therapy-stable  - 2 weeks prior to arrival and was started on Kevzara, previously on Orencia.  She is on chronic prednisone and leflunomide.  Follows with rheumatology [facility]    HTN/HLD-stable    LBP-ongoing  Urinary retention-ongoing  - Status post recent steroid injection.  MRI L-spine shows disc protrusion and compression\" and nerve root.  Ortiz catheter placed on 9/10.  Urinary retention not likely to improve until spinal compression resolved.  We will follow-up with neurosurgery.      Pertinent  and/or Most Recent Results     Results from last 7 days   Lab Units 09/15/22  0910 09/14/22  0502 " 09/13/22  0547 09/13/22  0016 09/12/22  1849 09/12/22  1500 09/12/22  0748 09/12/22  0651 09/11/22  1038 09/11/22  0540 09/11/22  0017 09/10/22  1425 09/10/22  0437 09/09/22  1818 09/09/22  1817   WBC 10*3/mm3  --   --  7.33  --   --   --   --  6.7  7.04  --  6.17  --   --  5.91  --  7.11   HEMOGLOBIN g/dL  --   --  11.6*  --   --   --   --  11.8*  11.8  --  11.4*  --   --  11.9*  --  13.6   HEMOGLOBIN, POC g/dL  --   --   --   --   --   --   --   --   --   --   --   --   --  15.0  --    HEMATOCRIT %  --   --  35.7  --   --   --   --  35.3  36.7  --  34.2  --   --  34.9  --  40.0   HEMATOCRIT POC %  --   --   --   --   --   --   --   --   --   --   --   --   --  44  --    PLATELETS 10*3/mm3  --   --  158  --   --   --   --  160  160  --  165  --   --  151  --  163   SODIUM mmol/L  --  135* 134* 132* 130* 133* 132* 129*   < > 134*  135*  --    < > 133*  133*  --  133*   POTASSIUM mmol/L 3.6 3.4* 3.7  --   --   --   --  3.7  --  3.7  3.7 4.2  --  2.6*  --  3.1*   CHLORIDE mmol/L  --  100 100  --   --   --   --  95*  --  99  --   --  90*  --  87*   CO2 mmol/L  --  25.0 22.0  --   --   --   --  21.0*  --  23.0  --   --  29.0  --  25.0   BUN mg/dL  --  24* 24*  --   --   --   --  15  --  10  --   --  11  --  11   CREATININE mg/dL  --  0.75 0.64  --   --   --   --  0.60  --  0.56*  --   --  0.74 0.80 0.84   GLUCOSE mg/dL  --  84 86  --   --   --   --  82  --  93  --   --  106*  --  104*   CALCIUM mg/dL  --  8.6 8.3*  --   --   --   --  8.5*  --  8.8  --   --  9.3  --  10.2    < > = values in this interval not displayed.     Results from last 7 days   Lab Units 09/10/22  0437 09/09/22  1818 09/09/22  1817   BILIRUBIN mg/dL 0.6  --   --    ALK PHOS U/L 35*  --   --    ALT (SGPT) U/L 25  --  34*   AST (SGOT) U/L 39*  --  43*   PROTIME seconds  --  11.9*  --    INR   --  1.0  --    APTT seconds  --   --  28.8     Results from last 7 days   Lab Units 09/10/22  0437   CHOLESTEROL mg/dL 228*   TRIGLYCERIDES mg/dL 225*    HDL CHOL mg/dL 57     Results from last 7 days   Lab Units 09/10/22  0437 09/09/22  2325 09/09/22  2116 09/09/22  1817   TSH uIU/mL  --   --   --  2.070   HEMOGLOBIN A1C % 5.90*  --   --   --    TROPONIN T ng/mL  --   --  <0.010 <0.010   PROCALCITONIN ng/mL  --   --  0.03  --    LACTATE mmol/L 1.7 2.8*  --   --        Brief Urine Lab Results  (Last result in the past 365 days)      Color   Clarity   Blood   Leuk Est   Nitrite   Protein   CREAT   Urine HCG        09/09/22 1930 Yellow   Clear   Negative   Negative   Negative   Negative                 Microbiology Results Abnormal     Procedure Component Value - Date/Time    Culture, CSF - Cerebrospinal Fluid, Lumbar Puncture [[id number]] Collected: 09/12/22 1327    Lab Status: Final result Specimen: Cerebrospinal Fluid from Lumbar Puncture Updated: 09/15/22 1316     CSF Culture No growth at 3 days     Gram Stain Many (4+) WBCs seen      No organisms seen    Blastomyces Antigen - Urine, Urine, Clean Catch [[id number]] Collected: 09/11/22 1122    Lab Status: Final result Specimen: Urine, Clean Catch Updated: 09/15/22 0914     MVista(R) Blastomyces Ag None Detected ng/mL      Comment: Results reported as ng/mL in 0.2 - 14.7 ng/mL range  Results above the limit of detection but below 0.2 ng/mL  are reported as 'Positive, Below the Limit of  Quantification'  Results above 14.7 ng/mL are reported as 'Positive,  Above the Limit of Quantification'        Specimen Type URINE    Narrative:      Performed at:  [facility] [employer]  [address] [place] [address] [place]  [id number]  : Deidre Ellis MD, Phone:  [phone]    Anaerobic Culture - Cerebrospinal Fluid, Spine, Lumbar [[id number]]  (Normal) Collected: 09/12/22 1327    Lab Status: Preliminary result Specimen: Cerebrospinal Fluid from Spine, Lumbar Updated: 09/15/22 0801     Anaerobic Culture No anaerobes isolated at 3 days    Blood Culture - Blood, Hand, Right [[id number]]  (Normal) Collected: 09/10/22 0437     Lab Status: Final result Specimen: Blood from Hand, Right Updated: 09/15/22 0717     Blood Culture No growth at 5 days    Blood Culture - Blood, Arm, Left [[id number]]  (Normal) Collected: 09/10/22 0437    Lab Status: Final result Specimen: Blood from Arm, Left Updated: 09/15/22 0717     Blood Culture No growth at 5 days    Histoplasma Ag Ur - Urine, Urine, Clean Catch [[id number]] Collected: 09/11/22 1122    Lab Status: Final result Specimen: Urine, Clean Catch Updated: 09/14/22 1612     Histoplasma Galactomannan Ag Ur <0.5    Narrative:      Test(s) 183562-Histoplasma Gal'chapito Ag Ur  was developed and its performance characteristics determined  by [employer]. It has not been cleared or approved by the Food  and Drug Administration.  Performed at:  [address] [place] [address]  [id number]  : Guillermina Nowak MD, Phone:  [phone]    Fungus Smear - Cerebrospinal Fluid, Lumbar Puncture [[id number]] Collected: 09/12/22 1327    Lab Status: Final result Specimen: Cerebrospinal Fluid from Lumbar Puncture Updated: 09/14/22 0953     Fungal Stain No fungal elements seen     Comment: SMEAR READ BY PATHOLOGIST       AFB Culture - Cerebrospinal Fluid, Lumbar Puncture [[id number]] Collected: 09/12/22 1327    Lab Status: Preliminary result Specimen: Cerebrospinal Fluid from Lumbar Puncture Updated: 09/13/22 1347     AFB Stain No acid fast bacilli seen on direct smear    Cryptococcal AG, CSF - Cerebrospinal Fluid, Lumbar Puncture [[id number]]  (Normal) Collected: 09/12/22 1327    Lab Status: Final result Specimen: Cerebrospinal Fluid from Lumbar Puncture Updated: 09/13/22 1050     Cryptococcal Antigen, CSF Negative    S. Pneumo Ag Urine or CSF - Urine, Urine, Clean Catch [[id number]]  (Normal) Collected: 09/13/22 0422    Lab Status: Final result Specimen: Urine, Clean Catch Updated: 09/13/22 0934     Strep Pneumo Ag Negative    Respiratory Panel PCR w/COVID-19(SARS-CoV-2)  TITA/HELENA/BRANDY/PAD/COR/MAD/RISA In-House, NP Swab in UTM/VTM, 3-4 HR TAT - Swab, Nasopharynx [[id number]]  (Normal) Collected: 09/10/22 0506    Lab Status: Final result Specimen: Swab from Nasopharynx Updated: 09/10/22 0554     ADENOVIRUS, PCR Not Detected     Coronavirus 229E Not Detected     Coronavirus HKU1 Not Detected     Coronavirus NL63 Not Detected     Coronavirus OC43 Not Detected     COVID19 Not Detected     Human Metapneumovirus Not Detected     Human Rhinovirus/Enterovirus Not Detected     Influenza A PCR Not Detected     Influenza B PCR Not Detected     Parainfluenza Virus 1 Not Detected     Parainfluenza Virus 2 Not Detected     Parainfluenza Virus 3 Not Detected     Parainfluenza Virus 4 Not Detected     RSV, PCR Not Detected     Bordetella pertussis pcr Not Detected     Bordetella parapertussis PCR Not Detected     Chlamydophila pneumoniae PCR Not Detected     Mycoplasma pneumo by PCR Not Detected    Narrative:      In the setting of a positive respiratory panel with a viral infection PLUS a negative procalcitonin without other underlying concern for bacterial infection, consider observing off antibiotics or discontinuation of antibiotics and continue supportive care. If the respiratory panel is positive for atypical bacterial infection (Bordetella pertussis, Chlamydophila pneumoniae, or Mycoplasma pneumoniae), consider antibiotic de-escalation to target atypical bacterial infection.    COVID PRE-OP / PRE-PROCEDURE SCREENING ORDER (NO ISOLATION) - Swab, Nasopharynx [[id number]]  (Normal) Collected: 09/09/22 2115    Lab Status: Final result Specimen: Swab from Nasopharynx Updated: 09/09/22 2146    Narrative:      The following orders were created for panel order COVID PRE-OP / PRE-PROCEDURE SCREENING ORDER (NO ISOLATION) - Swab, Nasopharynx.  Procedure                               Abnormality         Status                     ---------                               -----------         ------                      COVID-19 and FLU A/B PCR...[[id number]]  Normal              Final result                 Please view results for these tests on the individual orders.    COVID-19 and FLU A/B PCR - Swab, Nasopharynx [[id number]]  (Normal) Collected: 09/09/22 2115    Lab Status: Final result Specimen: Swab from Nasopharynx Updated: 09/09/22 2146     COVID19 Not Detected     Influenza A PCR Not Detected     Influenza B PCR Not Detected    Narrative:      Fact sheet for providers: https://www.fda.gov/media/787101/download    Fact sheet for patients: https://www.fda.gov/media/306849/download    Test performed by PCR.          Imaging Results (All)     Procedure Component Value Units Date/Time    IR LUMBAR PUNCTURE DIAGNOSTIC [[id number]] Collected: 09/12/22 1616     Updated: 09/12/22 1623    Narrative:      EXAMINATION: IR LUMBAR PUNCTURE DIAGNOSTIC-     INDICATION: ? Cns; R41.82-Altered mental status, unspecified;  E87.6-Hypokalemia; R41.841-Cognitive communication deficit     TECHNIQUE: 6 seconds of fluoroscopic time was used for this procedure. 1  associated images were saved. Informed consent was obtained from the  patient's family member.     The patient was placed in the prone position on the table. The back was  prepped and draped in a sterile fashion. 1% lidocaine was used as a  local anesthetic to the skin and subcutaneous tissues. Under  fluoroscopic guidance a 22-gauge spinal needle was advanced at the L2-L3  level to the CSF space. Approximately 8 cc of xanthochromic CSF was  returned and collected in 4 numbered vials. Sample vials were labeled  and sent to pathology for further analysis. The needle was removed.     COMPARISON: NONE     FINDINGS: The patient tolerated the procedure well, and no immediate  complications occurred.          Impression:      Successful fluoroscopic guided lumbar puncture as above with  no immediate complications.         This report was finalized on 9/12/2022 4:20 PM by Ceasar Nicole.        MRI Brain Without Contrast [[id number]] Collected: 09/10/22 0344     Updated: 09/10/22 0348    Narrative:      EXAMINATION: MRI BRAIN WITHOUT CONTRAST.      DATE: 9/10/2022 2:56 AM     INDICATION: Stroke follow-up.     COMPARISON: CT 9/9/2022     TECHNIQUE:  Sagittal T1, axial T1, T2, FLAIR, diffusion, SWI, coronal T1 images through the brain. No contrast.        FINDINGS:      Intracranial contents:    Ventricular and cisternal spaces are prominent from volume loss. Mild periventricular and subcortical white matter T2 hyperintensities, also noted in the jade. No dominant mass, midline shift, hydrocephalus, extra axial fluid collection or acute   hemorrhage.  Flow voids of the proximal major cerebral arteries are patent on T2 images.     Diffusion sequence: No focus of restricted diffusion to suggest acute ischemia. Limited due to artifacts.    Blood sensitive sequence: No significant blood product.    Extracranial soft tissues:    Mild mucosal thickening right frontal sinus. Opacified right ethmoid air cells. Opacified right maxillary sinus. Opacified right sphenoid sinus. Mild mucosal thickening left maxillary sinus and left sphenoid sinus. Retention cyst or polyp inferior left   maxillary sinus. Increased signal right greater than left mastoid air cells. Cataract surgery.      Impression:        1. No acute infarct.  2. Mild changes chronic small vessel ischemic disease. Volume loss.  3. Severe right-sided paranasal sinus disease. Mastoid effusions.       Electronically signed by:  Huey Briones M.D.    9/10/2022 1:47 AM [place] Time    MRI Lumbar Spine Without Contrast [[id number]] Collected: 09/10/22 0055     Updated: 09/10/22 0110    Narrative:      EXAMINATION: MRI LUMBAR SPINE WO CONTRAST    DATE: 9/9/2022 11:41 PM     INDICATION: back pain, recent epidural injection, RLE weakness     COMPARISON: None available.    TECHNIQUE: Sagittal and axial noncontrast images of the lumbar spine were obtained in  the usual manner.    FINDINGS:    For this report, there are 5 lumbar type vertebral bodies. Moderate scoliosis. 8 mm grade 2 anterolisthesis of L4 on L5.     Conus terminates at L2. Nerve roots of the cauda equina are normal.       No acute fractures. Vertebral body heights are maintained. No concerning marrow signal. Imaged portions of the sacrum and sacroiliac joints are within normal limits.    Imaged abdominal soft tissues are unremarkable. Moderate atrophy of the lower lumbar paraspinal muscles. More superiorly, mild atrophy of the right posterior paraspinal muscles at the level of the thoracolumbar junction.      Degenerative Changes:    T12-L1: Seen only on sagittal view. Disc height loss. Disc bulge. Facet arthropathy. Mild bilateral foraminal narrowing. No significant spinal canal narrowing.    L1-L2: Left eccentric disc bulge. Mild facet arthropathy. Mild bilateral foraminal narrowing. No significant spinal canal narrowing.    L2-L3: Loss of disc height. Large diffuse disc extrusion. Moderate facet arthropathy. Severe spinal canal narrowing. Thecal sac measures 3 mm in AP dimension. There is severe bilateral subarticular zone narrowing, worse on the left. Severe right and   moderate left foraminal narrowing.    L3-L4: Left eccentric disc bulge. Moderate facet arthropathy. Moderate bilateral foraminal narrowing. Mild left subarticular zone narrowing. Moderate spinal canal narrowing. Thecal sac measures 7 mm in AP dimension.    L4-L5: Grade 2 anterolisthesis with uncovering the superior aspect of the disc. Severe facet arthropathy. Moderate right and severe left foraminal narrowing. Severe bilateral subarticular zone narrowing, worse on the left. Severe thecal sac stenosis   which measures 5 mm in AP dimension.    L5-S1: Mild facet arthropathy. Small disc bulge. No significant spinal canal or foraminal narrowing.            Impression:        1.  Severe degenerative changes of the lumbar spine. Moderate  scoliosis.  2.  At L2-L3, there is severe spinal canal stenosis with crowding/compression of the cauda equina nerve roots due to a circumferential disc extrusion and facet arthropathy. There is also severe right foraminal narrowing.  3.  At L4-L5, there is severe spinal canal stenosis due to grade 2 anterolisthesis and facet arthropathy. There is also severe left foraminal narrowing.  4.  Additional milder degenerative changes detailed above.        Electronically signed by:  Jose Alfredo Finch DO    9/9/2022 11:09 PM [place] Time    XR Chest 1 View [[id number]] Collected: 09/09/22 1933     Updated: 09/09/22 1937    Narrative:      Examination: XR CHEST 1 VW-     Date of Exam: 9/9/2022 6:44 PM     Indication: Acute Stroke Protocol (onset < 12 hrs).     Comparison: None available.     Technique: Single radiographic view of the chest was obtained.     Findings:  There is no pneumothorax, pleural effusion or focal airspace  consolidation. Cardiomediastinal silhouette is unremarkable. Pulmonary  vasculature appears within normal limits. Regional bones appear grossly  intact. There is a retrocardiac rounded density containing gas, likely  large hiatal hernia.       Impression:         1. No acute cardiopulmonary abnormality.  2. Suspected hiatal hernia.     This report was finalized on 9/9/2022 7:34 PM by Omar Hughes MD.       CT Angiogram Head w AI Analysis of LVO [[id number]] Collected: 09/09/22 1850     Updated: 09/09/22 1900    Narrative:      Examination: CT ANGIOGRAM NECK-, CT ANGIOGRAM HEAD W AI ANALYSIS OF LVO-     Date of Exam: 9/9/2022 6:13 PM     Indication: Stroke, follow up.     Comparison: None available.     Technique: Axial volumetric contrast-enhanced CT angiographic images of  the head and neck were acquired utilizing 115 mL Isovue-370  IV  contrast. 3-D reconstructions were created for interpretation. Automated  exposure control and iterative reconstruction methods were used. AI  analysis of LVO was  utilized for the CTA Head imaging portion of the  study.         Findings:  Aortic arch: There is minimal plaque in the aortic arch.  There is 4  vessel arch anatomy with the left vertebral artery arising directly from  the aortic arch. The right brachiocephalic and visualized bilateral  subclavian arteries are within normal limits.     Right carotid: The right CCA arises as expected from the brachiocephalic  trunk.  The CCA follows a normal course and appears normal caliber.   There is minimal CCA calcified plaque. The carotid bifurcation is  unremarkable.  The external carotid artery and distal branches appear  within normal limits.  The cervical internal carotid artery follows a  normal course and appears normal caliber throughout the neck and into  the head.  The intracranial ICA segments appear within normal limits.   The ophthalmic artery origin is normal.  The A1 and M1 segments appear  within normal limits.  The visualized distal EDDIE and MCA branches appear  patent.  There is  a patent  anterior communicating artery. There is a  patent  posterior communicating artery.     Left carotid: The left CCA arises as expected from the aortic arch.    The CCA follows a normal course and appears normal caliber.  There is  mild nonstenosing plaque at the carotid bifurcation and distal CCA. The  external carotid artery and distal branches appear within normal limits.   The cervical internal carotid artery follows a normal course and  appears normal caliber throughout the neck and into the head.  The  intracranial ICA segments appear within normal limits.  The ophthalmic  artery origin is normal.  The A1 and M1 segments appear within normal  limits.  The visualized distal EDDIE and MCA branches appear patent.   There is a patent  posterior communicating artery.     Posterior circulation:Left vertebral artery arises from the aortic arch  and the right vertebral artery arises from the subclavian artery. There  is right  vertebral artery dominance. The vertebral arteries follow a  normal course and appear normal caliber throughout the neck and into the  head.  The V4 segments are patent.  Visualized posterior inferior  cerebellar arteries are within normal limits.  The basilar artery is  normal caliber.  Superior cerebellar arteries are patent.  Bilateral P1  segments and posterior cerebral arteries appear within normal limits.        Nonvascular findings: Intracranial structures appear unremarkable.   There is evidence of prior lens replacement. There is severe obstructive  paranasal sinus mucosal disease throughout the right side. Mastoids are  well aerated. Superficial soft tissues and underlying musculature appear  within normal limits.  The lung apices are clear.  The thyroid and  salivary glands appear unremarkable.  The suprahyoid and infrahyoid  spaces of the neck appear unremarkable.  There is no evidence of  cervical lymphadenopathy or a neck mass.  There are no acute osseous  abnormalities or destructive bone lesions. There are moderate cervical  degenerative changes. No high-grade spinal canal stenosis. There is 3 mm  anterolisthesis of C4 on C5 and there is severe C5-C6 disc degeneration.       Impression:         1. Minimal atherosclerotic plaque without evidence of stenosis,  occlusion, aneurysm or dissection in the neck or the head.  2. Cervical degenerative changes.  3. 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     This report was finalized on 9/9/2022 6:57 PM by Omar Hughes MD.       CT Angiogram Neck [[id number]] Collected: 09/09/22 1850     Updated: 09/09/22 1900    Narrative:      Examination: CT ANGIOGRAM NECK-, CT ANGIOGRAM HEAD W AI ANALYSIS OF LVO-     Date of Exam: 9/9/2022 6:13 PM     Indication: Stroke, follow up.     Comparison: None available.     Technique: Axial volumetric contrast-enhanced CT angiographic images of  the head and neck were acquired utilizing 115 mL  Isovue-370  IV  contrast. 3-D reconstructions were created for interpretation. Automated  exposure control and iterative reconstruction methods were used. AI  analysis of LVO was utilized for the CTA Head imaging portion of the  study.         Findings:  Aortic arch: There is minimal plaque in the aortic arch.  There is 4  vessel arch anatomy with the left vertebral artery arising directly from  the aortic arch. The right brachiocephalic and visualized bilateral  subclavian arteries are within normal limits.     Right carotid: The right CCA arises as expected from the brachiocephalic  trunk.  The CCA follows a normal course and appears normal caliber.   There is minimal CCA calcified plaque. The carotid bifurcation is  unremarkable.  The external carotid artery and distal branches appear  within normal limits.  The cervical internal carotid artery follows a  normal course and appears normal caliber throughout the neck and into  the head.  The intracranial ICA segments appear within normal limits.   The ophthalmic artery origin is normal.  The A1 and M1 segments appear  within normal limits.  The visualized distal EDDIE and MCA branches appear  patent.  There is  a patent  anterior communicating artery. There is a  patent  posterior communicating artery.     Left carotid: The left CCA arises as expected from the aortic arch.    The CCA follows a normal course and appears normal caliber.  There is  mild nonstenosing plaque at the carotid bifurcation and distal CCA. The  external carotid artery and distal branches appear within normal limits.   The cervical internal carotid artery follows a normal course and  appears normal caliber throughout the neck and into the head.  The  intracranial ICA segments appear within normal limits.  The ophthalmic  artery origin is normal.  The A1 and M1 segments appear within normal  limits.  The visualized distal EDDIE and MCA branches appear patent.   There is a patent  posterior  communicating artery.     Posterior circulation:Left vertebral artery arises from the aortic arch  and the right vertebral artery arises from the subclavian artery. There  is right vertebral artery dominance. The vertebral arteries follow a  normal course and appear normal caliber throughout the neck and into the  head.  The V4 segments are patent.  Visualized posterior inferior  cerebellar arteries are within normal limits.  The basilar artery is  normal caliber.  Superior cerebellar arteries are patent.  Bilateral P1  segments and posterior cerebral arteries appear within normal limits.        Nonvascular findings: Intracranial structures appear unremarkable.   There is evidence of prior lens replacement. There is severe obstructive  paranasal sinus mucosal disease throughout the right side. Mastoids are  well aerated. Superficial soft tissues and underlying musculature appear  within normal limits.  The lung apices are clear.  The thyroid and  salivary glands appear unremarkable.  The suprahyoid and infrahyoid  spaces of the neck appear unremarkable.  There is no evidence of  cervical lymphadenopathy or a neck mass.  There are no acute osseous  abnormalities or destructive bone lesions. There are moderate cervical  degenerative changes. No high-grade spinal canal stenosis. There is 3 mm  anterolisthesis of C4 on C5 and there is severe C5-C6 disc degeneration.       Impression:         1. Minimal atherosclerotic plaque without evidence of stenosis,  occlusion, aneurysm or dissection in the neck or the head.  2. Cervical degenerative changes.  3. 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     This report was finalized on 9/9/2022 6:57 PM by Omar Hughes MD.       CT CEREBRAL PERFUSION WITH & WITHOUT CONTRAST [[id number]] Collected: 09/09/22 1844     Updated: 09/09/22 1851    Narrative:      DATE OF EXAM: 9/9/2022 6:13 PM     PROCEDURE: CT CEREBRAL PERFUSION W WO CONTRAST-      INDICATIONS: Neuro deficit, acute, stroke suspected     COMPARISON: No comparisons available.     TECHNIQUE: Routine transaxial cuts were obtained through the head  without administration of contrast. Routine transaxial cuts were then  obtained through the head following the intravenous administration of  115 mL of Isovue 370. Core blood volume, core blood flow, mean transit  time, and Tmax images were obtained utilizing the Rapid software  protocol. A limited CT angiogram of the head was also performed to  measure the blood vessel density.      The radiation dose reduction device was turned on for each scan per the  ALARA (As Low as Reasonably Achievable) protocol.     FINDINGS:   There is no significant perfusion abnormality in the brain. Cerebral  blood flow, cerebral blood volume and mean transit time appear within  normal limits.        Impression:      No perfusion abnormalities in the brain. No evidence of brain risk or  cortical infarct.     This report was finalized on 9/9/2022 6:46 PM by Omar Hughes MD.       CT Head Without Contrast Stroke Protocol [[id number]] Collected: 09/09/22 1812     Updated: 09/09/22 1817    Narrative:      Examination: CT HEAD WO CONTRAST STROKE PROTOCOL-     Date of Exam: 9/9/2022 6:06 PM     Indication: Neuro deficit, acute, stroke suspected.     Comparison: None available.     Technique: Axial noncontrast CT imaging of the head was performed.  Automated exposure control and iterative reconstruction methods were  used.     Findings:  Superficial soft tissues appear within normal limits. The calvarium is  intact.  There is complete filling of the right maxillary sinus and the  right sphenoid sinus with mucosal disease. Moderate right ethmoid sinus  mucosal disease. The frontal sinuses are aplastic. The mastoids appear  well aerated. There is thinning of the orbital lenses bilaterally  suggestive of prior lens replacement. There is no acute intracranial  hemorrhage.  No mass  effect or midline shift.  No abnormal extra-axial  collections.  Gray-white differentiation is within normal limits.  There  is mild patchy periventricular white matter hypoattenuation. Ventricular  size and configuration is normal for age.          Impression:         1. No acute intracranial abnormality.  2. Mild chronic small vessel ischemic change.  3. Severe obstructive right-sided paranasal sinus mucosal disease.     This report was finalized on 9/9/2022 6:14 PM by Omar Hughes MD.                     Results for orders placed during the hospital encounter of 09/09/22    Adult Transthoracic Echo Complete W/ Cont if Necessary Per Protocol (With Agitated Saline)    Interpretation Summary  · Left ventricular systolic function is hyperdynamic (EF > 70%).  · Left ventricular diastolic function is consistent with (grade I) impaired relaxation.  · The cardiac valves are anatomically and functionally normal.  · Nondiagnostic bubble study      Pending Labs     Order Current Status    Encephalitis CSF - Cerebrospinal Fluid, Lumbar Puncture In process    Fungus Culture - Cerebrospinal Fluid, Lumbar Puncture In process    Oligoclonal Banding (COLLECT RED TUBE) In process    AFB Culture - Cerebrospinal Fluid, Lumbar Puncture Preliminary result    Anaerobic Culture - Cerebrospinal Fluid, Spine, Lumbar Preliminary result        Discharge Details        Discharge Medications      New Medications      Instructions Start Date   acetaminophen 325 MG tablet  Commonly known as: TYLENOL   650 mg, Oral, Every 6 Hours PRN      acyclovir 616 mg in sodium chloride 0.9 % 100 mL IVPB   10 mg/kg (616 mg), Intravenous, Every 8 Hours      aspirin 325 MG tablet  Replaces: aspirin 81 MG EC tablet   325 mg, Oral, Daily   Start Date: September 17, 2022     atorvastatin 80 MG tablet  Commonly known as: LIPITOR   80 mg, Oral, Nightly         Changes to Medications      Instructions Start Date   amLODIPine 5 MG tablet  Commonly known as:  NORVASC  What changed: when to take this   5 mg, Oral, Every 24 Hours Scheduled   Start Date: September 17, 2022     leflunomide 20 MG tablet  Commonly known as: ARAVA  What changed: These instructions start on September 20, 2022. If you are unsure what to do until then, ask your doctor or other care provider.   20 mg, Oral, Daily   Start Date: September 20, 2022     metoprolol tartrate 50 MG tablet  Commonly known as: LOPRESSOR  What changed: when to take this   50 mg, Oral, Every 12 Hours Scheduled      predniSONE 5 MG tablet  Commonly known as: DELTASONE  What changed: medication strength   5 mg, Oral, Daily   Start Date: September 17, 2022        Continue These Medications      Instructions Start Date   cholecalciferol 25 MCG (1000 UT) tablet  Commonly known as: VITAMIN D3   2,000 Units, Oral, Daily      fluticasone 50 MCG/ACT nasal spray  Commonly known as: FLONASE   2 sprays, Nasal, As Needed, Administer 2 sprays in each nostril for each dose.      levothyroxine 75 MCG tablet  Commonly known as: SYNTHROID, LEVOTHROID   75 mcg, Oral, Daily      LORazepam 0.5 MG tablet  Commonly known as: ATIVAN   0.5 mg, Oral, Every 8 Hours PRN      multivitamin with minerals tablet tablet   1 tablet, Oral, Daily         Stop These Medications    aspirin 81 MG EC tablet  Replaced by: aspirin 325 MG tablet     clopidogrel 75 MG tablet  Commonly known as: PLAVIX     hydroCHLOROthiazide 25 MG tablet  Commonly known as: HYDRODIURIL     potassium chloride 10 MEQ CR capsule  Commonly known as: MICRO-K     potassium chloride 10 MEQ CR tablet     pravastatin 80 MG tablet  Commonly known as: PRAVACHOL     traMADol 50 MG tablet  Commonly known as: ULTRAM            No Known Allergies      Discharge Disposition:  Rehab Facility or Unit (DC - External)    Discharge Diet:  Diet Order   Procedures   • Diet Regular; Cardiac         Discharge Activity:   Activity Instructions     Activity as Tolerated      Up WIth Assist              CODE  STATUS:    Code Status and Medical Interventions:   Ordered at: 09/09/22 4796     Level Of Support Discussed With:    Patient     Code Status (Patient has no pulse and is not breathing):    CPR (Attempt to Resuscitate)     Medical Interventions (Patient has pulse or is breathing):    Full Support         No future appointments.    Additional Instructions for the Follow-ups that You Need to Schedule     Discharge Follow-up with PCP   As directed       Currently Documented PCP:    Rolando Jeronimo MD    PCP Phone Number:    [phone]     Follow Up Details: Follow up with PCP in 1 week         Discharge Follow-up with Specified Provider: Follow up with Dr Jeronimo in 1 week after discharge from rehab   As directed      To: Follow up with Dr Jeronimo in 1 week after discharge from rehab               Time Spent on Discharge:  45 minutes    Electronically signed by MIHAI Wu, 09/16/22, 11:35 AM EDT.

## 2022-09-19 LAB — BACTERIA SPEC ANAEROBE CULT: NORMAL

## 2022-09-20 LAB
HSV1+2 IGG CSF-ACNC: 17.76 IV
HSV1+2 IGM CSF-ACNC: 0.25 IV
MEV IGG CSF-ACNC: 226 AU/ML
MEV IGM CSF-ACNC: 0.18 AU (ref 0–0.79)
MUV IGG CSF-ACNC: <5 AU/ML
MUV IGM CSF-ACNC: 0.51 IV
VZV IGG CSF IA-ACNC: 2968 IV
VZV IGM CSF-ACNC: 12 ISR
WNV IGG CSF-ACNC: 0.44 IV
WNV IGM CSF-ACNC: 0.03 IV

## 2022-09-21 LAB — HERPES SIMPLEX VIRUS 2 IGG AB [PRESENCE] IN CEREBRAL SPINAL FLUID: 0.1 IV

## 2022-09-23 LAB — HERPES SIMPLEX VIRUS 1 IGG AB [UNITS/VOLUME] IN CEREBRAL SPINAL FLUID: 5.98 IV

## 2022-09-30 ENCOUNTER — TRANSCRIBE ORDERS (OUTPATIENT)
Dept: HOME HEALTH SERVICES | Facility: HOME HEALTHCARE | Age: 77
End: 2022-09-30

## 2022-09-30 ENCOUNTER — HOME HEALTH ADMISSION (OUTPATIENT)
Dept: HOME HEALTH SERVICES | Facility: HOME HEALTHCARE | Age: 77
End: 2022-09-30

## 2022-09-30 DIAGNOSIS — M06.9 RHEUMATOID ARTHRITIS INVOLVING MULTIPLE SITES, UNSPECIFIED WHETHER RHEUMATOID FACTOR PRESENT: ICD-10-CM

## 2022-09-30 DIAGNOSIS — B01.11: Primary | ICD-10-CM

## 2022-10-01 ENCOUNTER — HOME CARE VISIT (OUTPATIENT)
Dept: HOME HEALTH SERVICES | Facility: HOME HEALTHCARE | Age: 77
End: 2022-10-01

## 2022-10-01 PROCEDURE — G0299 HHS/HOSPICE OF RN EA 15 MIN: HCPCS

## 2022-10-02 VITALS
OXYGEN SATURATION: 96 % | RESPIRATION RATE: 16 BRPM | WEIGHT: 129 LBS | TEMPERATURE: 97.6 F | DIASTOLIC BLOOD PRESSURE: 82 MMHG | BODY MASS INDEX: 22.86 KG/M2 | HEIGHT: 63 IN | SYSTOLIC BLOOD PRESSURE: 166 MMHG | HEART RATE: 88 BPM

## 2022-10-02 NOTE — HOME HEALTH
SOC Note: Recent hospitalization due to sudden onset of weakness and severe confusion. Diagnosed with varciella encephalitis and encephalomyelitis. Unknown origin. Patient home with sorenson catheter due to urinary retention. Awaiting urology appointment for ongoing orders and care of catheter. Patient extremely weak and requires use of 1-2 people for assistance at this time. VS wnl for this visit.     Home Health ordered for: disciplines sn/pt/ot    Reason for Hosp/Primary Dx/Co-morbidities: Varicella encephalitis and encephalomyelitis    Focus of Care: medication teaching, neuroassessmnets related to encephalitis    Current Functional status/mobility/DME: [facility] with assistance and wheelchair in home    HB status/Living Arrangements: lives with  around the clock    Skin Integrity/wound status: intact    Code Status: Full    Fall Risk: Yes    POC confirmed with patient and  on 10/1/22

## 2022-10-03 ENCOUNTER — HOME CARE VISIT (OUTPATIENT)
Dept: HOME HEALTH SERVICES | Facility: HOME HEALTHCARE | Age: 77
End: 2022-10-03

## 2022-10-03 VITALS
DIASTOLIC BLOOD PRESSURE: 54 MMHG | RESPIRATION RATE: 16 BRPM | SYSTOLIC BLOOD PRESSURE: 101 MMHG | HEART RATE: 94 BPM | OXYGEN SATURATION: 95 %

## 2022-10-03 PROCEDURE — G0151 HHCP-SERV OF PT,EA 15 MIN: HCPCS

## 2022-10-03 NOTE — HOME HEALTH
"\"Vane Villavicencio is a 77 y.o. female PMH RA on leflunomide, chronic prednisone, history of SVT, HTN/HLD, hypothyroidism presenting to the hospital for evaluation of altered mental status initially admitted as a code stroke.  Stroke evaluation did not demonstrate acute stroke, UA/UDS was not significantly contributory.  She underwent LP 9/12 with meningitis PCR returned positive for varicella-zoster, mentation is improved with IV acyclovir.\"   Pt discharged from the hospital to [facility] for 2 weeks for IPR. Pt lives at home with  and sister. Was previously mod I with cane. Bed/bath on first floor. Pt currently able to ambulate x20 ft with min a. Educated to only ambulate with assist from son and  or sister follow with w/c for safety."

## 2022-10-04 ENCOUNTER — HOME CARE VISIT (OUTPATIENT)
Dept: HOME HEALTH SERVICES | Facility: HOME HEALTHCARE | Age: 77
End: 2022-10-04

## 2022-10-04 VITALS
DIASTOLIC BLOOD PRESSURE: 68 MMHG | OXYGEN SATURATION: 97 % | TEMPERATURE: 97.7 F | RESPIRATION RATE: 16 BRPM | SYSTOLIC BLOOD PRESSURE: 118 MMHG | HEART RATE: 69 BPM

## 2022-10-04 VITALS
HEART RATE: 69 BPM | TEMPERATURE: 97.7 F | RESPIRATION RATE: 16 BRPM | DIASTOLIC BLOOD PRESSURE: 68 MMHG | SYSTOLIC BLOOD PRESSURE: 118 MMHG | OXYGEN SATURATION: 97 %

## 2022-10-04 PROCEDURE — G0153 HHCP-SVS OF S/L PATH,EA 15MN: HCPCS

## 2022-10-04 PROCEDURE — G0495 RN CARE TRAIN/EDU IN HH: HCPCS

## 2022-10-05 NOTE — HOME HEALTH
sn visit today. f/c patent and draining clear yellow urine. leg bag ordered. Pt sees pcp tomorrow. Pcp needs to refer pt to urologist.

## 2022-10-06 ENCOUNTER — HOME CARE VISIT (OUTPATIENT)
Dept: HOME HEALTH SERVICES | Facility: HOME HEALTHCARE | Age: 77
End: 2022-10-06

## 2022-10-06 VITALS
DIASTOLIC BLOOD PRESSURE: 68 MMHG | RESPIRATION RATE: 16 BRPM | OXYGEN SATURATION: 97 % | SYSTOLIC BLOOD PRESSURE: 138 MMHG | HEART RATE: 77 BPM

## 2022-10-06 PROCEDURE — G0152 HHCP-SERV OF OT,EA 15 MIN: HCPCS

## 2022-10-06 PROCEDURE — G0151 HHCP-SERV OF PT,EA 15 MIN: HCPCS

## 2022-10-07 ENCOUNTER — HOME CARE VISIT (OUTPATIENT)
Dept: HOME HEALTH SERVICES | Facility: HOME HEALTHCARE | Age: 77
End: 2022-10-07

## 2022-10-07 VITALS
TEMPERATURE: 97 F | RESPIRATION RATE: 16 BRPM | OXYGEN SATURATION: 96 % | DIASTOLIC BLOOD PRESSURE: 74 MMHG | HEART RATE: 83 BPM | SYSTOLIC BLOOD PRESSURE: 120 MMHG

## 2022-10-07 PROCEDURE — G0300 HHS/HOSPICE OF LPN EA 15 MIN: HCPCS

## 2022-10-07 NOTE — HOME HEALTH
Routine Visit Note:    Skill/education provided: endurance, balance and strength interventions and gait training    Patient/caregiver response: tolerated session well; fatigue noted following exercises and gait    Plan for next visit: continue to progress as tolerated; practice gait training over threshold and small step to enter/exit home to the back of the house    Other pertinent info: N/A

## 2022-10-10 ENCOUNTER — HOME CARE VISIT (OUTPATIENT)
Dept: HOME HEALTH SERVICES | Facility: HOME HEALTHCARE | Age: 77
End: 2022-10-10

## 2022-10-10 VITALS
OXYGEN SATURATION: 97 % | SYSTOLIC BLOOD PRESSURE: 120 MMHG | DIASTOLIC BLOOD PRESSURE: 64 MMHG | RESPIRATION RATE: 16 BRPM | HEART RATE: 85 BPM

## 2022-10-10 PROCEDURE — G0151 HHCP-SERV OF PT,EA 15 MIN: HCPCS

## 2022-10-10 NOTE — HOME HEALTH
Routine Visit Note:    Skill/education provided: gait training, activity tolerance interventions and strengthening exercises    Patient/caregiver response: pt did report increased fatigue during and after session; tremor-like activity noted    Plan for next visit: continue to progress as tolerated    Other pertinent info: pt reports minimal appetite;  MD notified

## 2022-10-11 ENCOUNTER — HOME CARE VISIT (OUTPATIENT)
Dept: HOME HEALTH SERVICES | Facility: HOME HEALTHCARE | Age: 77
End: 2022-10-11

## 2022-10-11 VITALS
HEART RATE: 88 BPM | OXYGEN SATURATION: 96 % | DIASTOLIC BLOOD PRESSURE: 64 MMHG | SYSTOLIC BLOOD PRESSURE: 110 MMHG | TEMPERATURE: 99 F | RESPIRATION RATE: 16 BRPM

## 2022-10-11 VITALS
HEART RATE: 79 BPM | SYSTOLIC BLOOD PRESSURE: 129 MMHG | OXYGEN SATURATION: 97 % | RESPIRATION RATE: 16 BRPM | DIASTOLIC BLOOD PRESSURE: 81 MMHG

## 2022-10-11 PROCEDURE — G0495 RN CARE TRAIN/EDU IN HH: HCPCS

## 2022-10-12 ENCOUNTER — HOME CARE VISIT (OUTPATIENT)
Dept: HOME HEALTH SERVICES | Facility: HOME HEALTHCARE | Age: 77
End: 2022-10-12

## 2022-10-12 ENCOUNTER — TELEPHONE (OUTPATIENT)
Dept: NEUROSURGERY | Facility: CLINIC | Age: 77
End: 2022-10-12

## 2022-10-12 VITALS
RESPIRATION RATE: 16 BRPM | HEART RATE: 67 BPM | DIASTOLIC BLOOD PRESSURE: 76 MMHG | SYSTOLIC BLOOD PRESSURE: 110 MMHG | OXYGEN SATURATION: 95 %

## 2022-10-12 PROCEDURE — G0151 HHCP-SERV OF PT,EA 15 MIN: HCPCS

## 2022-10-12 NOTE — HOME HEALTH
Pt continues with f/c. Clear yellow urine noted in leg bag. No issues. Pt has appt with urologist next tues, 10.18.22.

## 2022-10-12 NOTE — HOME HEALTH
Routine Visit Note: OT INITIAL EVAL    Skill/education provided: OT EVAL COMPLETED. OT PROVIDED SKILLED INSTRUCTION ON HOME SAFETY WITH MOBILITY, BATHROOM TRANSFERS AND ADLS.    Patient/caregiver response: PT RETURNED DEMO OR VERBALIZED UNDERSTAINDING OF INSTRUCTION.    Plan for next visit: CONTINUE ADL RETRAINING, HOME SAFETY ED/MODS AND HEP INSTRUCTION.

## 2022-10-13 ENCOUNTER — HOME CARE VISIT (OUTPATIENT)
Dept: HOME HEALTH SERVICES | Facility: HOME HEALTHCARE | Age: 77
End: 2022-10-13

## 2022-10-13 NOTE — HOME HEALTH
Routine Visit Note:    Skill/education provided: TE, gait training, activity tolerance    Patient/caregiver response: tolerated treatment well with no complaints    Plan for next visit: continue to progress as tolerated    Other pertinent info: C/o R foot pain. Evaluated foot, mild swelling and redness. Encouraged pt to elevate and ice for swelling. Pt states MD is aware and monitoring.

## 2022-10-13 NOTE — CASE COMMUNICATION
Patient missed a [facility] visit from [facility] on 10-13-22.     Reason: N/A.      For your records only.   As per home health protocol, MD must be notified of missed/cancelled visits; therefore the prescribed frequency was not met.

## 2022-10-14 ENCOUNTER — HOME CARE VISIT (OUTPATIENT)
Dept: HOME HEALTH SERVICES | Facility: HOME HEALTHCARE | Age: 77
End: 2022-10-14

## 2022-10-14 PROCEDURE — G0299 HHS/HOSPICE OF RN EA 15 MIN: HCPCS

## 2022-10-14 PROCEDURE — G0152 HHCP-SERV OF OT,EA 15 MIN: HCPCS

## 2022-10-17 ENCOUNTER — HOME CARE VISIT (OUTPATIENT)
Dept: HOME HEALTH SERVICES | Facility: HOME HEALTHCARE | Age: 77
End: 2022-10-17

## 2022-10-17 VITALS
SYSTOLIC BLOOD PRESSURE: 129 MMHG | HEART RATE: 82 BPM | OXYGEN SATURATION: 98 % | RESPIRATION RATE: 16 BRPM | DIASTOLIC BLOOD PRESSURE: 93 MMHG

## 2022-10-17 VITALS
SYSTOLIC BLOOD PRESSURE: 106 MMHG | TEMPERATURE: 97.5 F | RESPIRATION RATE: 18 BRPM | OXYGEN SATURATION: 96 % | HEART RATE: 84 BPM | DIASTOLIC BLOOD PRESSURE: 73 MMHG

## 2022-10-17 VITALS
SYSTOLIC BLOOD PRESSURE: 113 MMHG | HEART RATE: 85 BPM | OXYGEN SATURATION: 97 % | TEMPERATURE: 98.2 F | DIASTOLIC BLOOD PRESSURE: 62 MMHG | RESPIRATION RATE: 18 BRPM

## 2022-10-17 PROCEDURE — G0151 HHCP-SERV OF PT,EA 15 MIN: HCPCS

## 2022-10-17 PROCEDURE — G0156 HHCP-SVS OF AIDE,EA 15 MIN: HCPCS

## 2022-10-18 ENCOUNTER — HOSPITAL ENCOUNTER (INPATIENT)
Facility: HOSPITAL | Age: 77
LOS: 2 days | Discharge: HOME HEALTH | End: 2022-10-20
Attending: EMERGENCY MEDICINE | Admitting: INTERNAL MEDICINE

## 2022-10-18 ENCOUNTER — HOME CARE VISIT (OUTPATIENT)
Dept: HOME HEALTH SERVICES | Facility: HOME HEALTHCARE | Age: 77
End: 2022-10-18

## 2022-10-18 ENCOUNTER — APPOINTMENT (OUTPATIENT)
Dept: GENERAL RADIOLOGY | Facility: HOSPITAL | Age: 77
End: 2022-10-18

## 2022-10-18 ENCOUNTER — TELEPHONE (OUTPATIENT)
Dept: UROLOGY | Facility: CLINIC | Age: 77
End: 2022-10-18

## 2022-10-18 DIAGNOSIS — E87.6 HYPOKALEMIA: ICD-10-CM

## 2022-10-18 DIAGNOSIS — R82.81 PYURIA: ICD-10-CM

## 2022-10-18 DIAGNOSIS — D84.9 IMMUNOCOMPROMISED: Chronic | ICD-10-CM

## 2022-10-18 DIAGNOSIS — G93.40 ACUTE ENCEPHALOPATHY: Primary | ICD-10-CM

## 2022-10-18 DIAGNOSIS — R94.39 ABNORMAL STRESS TEST: ICD-10-CM

## 2022-10-18 DIAGNOSIS — I10 BENIGN HYPERTENSION: ICD-10-CM

## 2022-10-18 PROBLEM — T83.511A URINARY TRACT INFECTION ASSOCIATED WITH INDWELLING URETHRAL CATHETER: Status: ACTIVE | Noted: 2022-10-18

## 2022-10-18 PROBLEM — N39.0 URINARY TRACT INFECTION ASSOCIATED WITH INDWELLING URETHRAL CATHETER (HCC): Status: ACTIVE | Noted: 2022-10-18

## 2022-10-18 LAB
ALBUMIN SERPL-MCNC: 3.8 G/DL (ref 3.5–5.2)
ALBUMIN/GLOB SERPL: 1.3 G/DL
ALP SERPL-CCNC: 45 U/L (ref 39–117)
ALT SERPL W P-5'-P-CCNC: 24 U/L (ref 1–33)
ANION GAP SERPL CALCULATED.3IONS-SCNC: 14 MMOL/L (ref 5–15)
APPEARANCE CSF: CLEAR
AST SERPL-CCNC: 26 U/L (ref 1–32)
BACTERIA UR QL AUTO: ABNORMAL /HPF
BASOPHILS # BLD AUTO: 0.14 10*3/MM3 (ref 0–0.2)
BASOPHILS NFR BLD AUTO: 1.3 % (ref 0–1.5)
BILIRUB SERPL-MCNC: 0.4 MG/DL (ref 0–1.2)
BILIRUB UR QL STRIP: NEGATIVE
BUN SERPL-MCNC: 10 MG/DL (ref 8–23)
BUN/CREAT SERPL: 14.3 (ref 7–25)
C GATTII+NEOFOR DNA CSF QL NAA+NON-PROBE: NOT DETECTED
CALCIUM SPEC-SCNC: 9.6 MG/DL (ref 8.6–10.5)
CHLORIDE SERPL-SCNC: 92 MMOL/L (ref 98–107)
CLARITY UR: ABNORMAL
CMV DNA CSF QL NAA+PROBE: NOT DETECTED
CO2 SERPL-SCNC: 25 MMOL/L (ref 22–29)
COLOR CSF: COLORLESS
COLOR UR: YELLOW
CREAT SERPL-MCNC: 0.7 MG/DL (ref 0.57–1)
CRP SERPL-MCNC: <0.3 MG/DL (ref 0–0.5)
D-LACTATE SERPL-SCNC: 1.7 MMOL/L (ref 0.5–2)
D-LACTATE SERPL-SCNC: 3.6 MMOL/L (ref 0.5–2)
DEPRECATED RDW RBC AUTO: 63.4 FL (ref 37–54)
E COLI K1 DNA CSF QL NAA+NON-PROBE: NOT DETECTED
EGFRCR SERPLBLD CKD-EPI 2021: 90.3 ML/MIN/1.73
EOSINOPHIL # BLD AUTO: 0.46 10*3/MM3 (ref 0–0.4)
EOSINOPHIL NFR BLD AUTO: 4.4 % (ref 0.3–6.2)
ERYTHROCYTE [DISTWIDTH] IN BLOOD BY AUTOMATED COUNT: 19 % (ref 12.3–15.4)
ERYTHROCYTE [SEDIMENTATION RATE] IN BLOOD: 40 MM/HR (ref 0–30)
EV RNA CSF QL NAA+PROBE: NOT DETECTED
FLUAV RNA RESP QL NAA+PROBE: NOT DETECTED
FLUBV RNA RESP QL NAA+PROBE: NOT DETECTED
GLOBULIN UR ELPH-MCNC: 3 GM/DL
GLUCOSE BLDC GLUCOMTR-MCNC: 118 MG/DL (ref 70–130)
GLUCOSE CSF-MCNC: 49 MG/DL (ref 40–70)
GLUCOSE SERPL-MCNC: 100 MG/DL (ref 65–99)
GLUCOSE UR STRIP-MCNC: NEGATIVE MG/DL
GP B STREP DNA SPEC QL NAA+PROBE: NOT DETECTED
HAEM INFLU SEROTYP DNA SPEC NAA+PROBE: NOT DETECTED
HCT VFR BLD AUTO: 33.5 % (ref 34–46.6)
HGB BLD-MCNC: 11.1 G/DL (ref 12–15.9)
HGB UR QL STRIP.AUTO: NEGATIVE
HHV6 DNA CSF QL NAA+PROBE: NOT DETECTED
HOLD SPECIMEN: NORMAL
HSV1 DNA CSF QL NAA+PROBE: NOT DETECTED
HSV2 DNA CSF QL NAA+PROBE: NOT DETECTED
HYALINE CASTS UR QL AUTO: ABNORMAL /LPF
IMM GRANULOCYTES # BLD AUTO: 0.04 10*3/MM3 (ref 0–0.05)
IMM GRANULOCYTES NFR BLD AUTO: 0.4 % (ref 0–0.5)
KETONES UR QL STRIP: NEGATIVE
L MONOCYTOG RRNA SPEC QL PROBE: NOT DETECTED
LEUKOCYTE ESTERASE UR QL STRIP.AUTO: ABNORMAL
LYMPHOCYTES # BLD AUTO: 3.29 10*3/MM3 (ref 0.7–3.1)
LYMPHOCYTES NFR BLD AUTO: 31.6 % (ref 19.6–45.3)
LYMPHOCYTES NFR CSF MANUAL: 90 % (ref 62–100)
MAGNESIUM SERPL-MCNC: 1.6 MG/DL (ref 1.6–2.4)
MCH RBC QN AUTO: 30.5 PG (ref 26.6–33)
MCHC RBC AUTO-ENTMCNC: 33.1 G/DL (ref 31.5–35.7)
MCV RBC AUTO: 92 FL (ref 79–97)
MONOCYTES # BLD AUTO: 1.7 10*3/MM3 (ref 0.1–0.9)
MONOCYTES NFR BLD AUTO: 16.3 % (ref 5–12)
MONOCYTES NFR CSF MANUAL: 10 % (ref 0–36)
N MEN DNA SPEC QL NAA+PROBE: NOT DETECTED
NEUTROPHILS NFR BLD AUTO: 4.78 10*3/MM3 (ref 1.7–7)
NEUTROPHILS NFR BLD AUTO: 46 % (ref 42.7–76)
NITRITE UR QL STRIP: POSITIVE
NRBC BLD AUTO-RTO: 0 /100 WBC (ref 0–0.2)
PARECHOVIRUS A RNA CSF QL NAA+NON-PROBE: NOT DETECTED
PH UR STRIP.AUTO: 6 [PH] (ref 5–8)
PLATELET # BLD AUTO: 282 10*3/MM3 (ref 140–450)
PMV BLD AUTO: 11 FL (ref 6–12)
POTASSIUM SERPL-SCNC: 2.8 MMOL/L (ref 3.5–5.2)
POTASSIUM SERPL-SCNC: 3.2 MMOL/L (ref 3.5–5.2)
PROCALCITONIN SERPL-MCNC: 0.05 NG/ML (ref 0–0.25)
PROT CSF-MCNC: 195.6 MG/DL (ref 15–45)
PROT SERPL-MCNC: 6.8 G/DL (ref 6–8.5)
PROT UR QL STRIP: ABNORMAL
QT INTERVAL: 370 MS
QTC INTERVAL: 426 MS
RBC # BLD AUTO: 3.64 10*6/MM3 (ref 3.77–5.28)
RBC # CSF MANUAL: 1 /MM3 (ref 0–5)
RBC # UR STRIP: ABNORMAL /HPF
REF LAB TEST METHOD: ABNORMAL
S PNEUM DNA CSF QL NAA+NON-PROBE: NOT DETECTED
SARS-COV-2 RNA RESP QL NAA+PROBE: NOT DETECTED
SODIUM SERPL-SCNC: 131 MMOL/L (ref 136–145)
SP GR UR STRIP: 1.01 (ref 1–1.03)
SPECIMEN VOL CSF: 9 ML
SQUAMOUS #/AREA URNS HPF: ABNORMAL /HPF
TROPONIN T SERPL-MCNC: 0.01 NG/ML (ref 0–0.03)
TUBE # CSF: 4
UROBILINOGEN UR QL STRIP: ABNORMAL
VZV DNA CSF QL NAA+PROBE: NOT DETECTED
WBC # CSF MANUAL: 41 /MM3 (ref 0–5)
WBC # UR STRIP: ABNORMAL /HPF
WBC NRBC COR # BLD: 10.41 10*3/MM3 (ref 3.4–10.8)
WHOLE BLOOD HOLD COAG: NORMAL
WHOLE BLOOD HOLD SPECIMEN: NORMAL

## 2022-10-18 PROCEDURE — 99285 EMERGENCY DEPT VISIT HI MDM: CPT

## 2022-10-18 PROCEDURE — 84145 PROCALCITONIN (PCT): CPT | Performed by: EMERGENCY MEDICINE

## 2022-10-18 PROCEDURE — 99223 1ST HOSP IP/OBS HIGH 75: CPT | Performed by: INTERNAL MEDICINE

## 2022-10-18 PROCEDURE — 83605 ASSAY OF LACTIC ACID: CPT | Performed by: EMERGENCY MEDICINE

## 2022-10-18 PROCEDURE — 0 LIDOCAINE 1 % SOLUTION: Performed by: STUDENT IN AN ORGANIZED HEALTH CARE EDUCATION/TRAINING PROGRAM

## 2022-10-18 PROCEDURE — 87070 CULTURE OTHR SPECIMN AEROBIC: CPT | Performed by: EMERGENCY MEDICINE

## 2022-10-18 PROCEDURE — 83735 ASSAY OF MAGNESIUM: CPT | Performed by: EMERGENCY MEDICINE

## 2022-10-18 PROCEDURE — 82962 GLUCOSE BLOOD TEST: CPT

## 2022-10-18 PROCEDURE — 25010000002 ACYCLOVIR PER 5 MG: Performed by: NURSE PRACTITIONER

## 2022-10-18 PROCEDURE — 93005 ELECTROCARDIOGRAM TRACING: CPT

## 2022-10-18 PROCEDURE — 25010000002 CEFTRIAXONE PER 250 MG: Performed by: EMERGENCY MEDICINE

## 2022-10-18 PROCEDURE — 36415 COLL VENOUS BLD VENIPUNCTURE: CPT

## 2022-10-18 PROCEDURE — 85025 COMPLETE CBC W/AUTO DIFF WBC: CPT | Performed by: EMERGENCY MEDICINE

## 2022-10-18 PROCEDURE — 93005 ELECTROCARDIOGRAM TRACING: CPT | Performed by: EMERGENCY MEDICINE

## 2022-10-18 PROCEDURE — 89051 BODY FLUID CELL COUNT: CPT | Performed by: EMERGENCY MEDICINE

## 2022-10-18 PROCEDURE — 25010000002 ACYCLOVIR PER 5 MG: Performed by: EMERGENCY MEDICINE

## 2022-10-18 PROCEDURE — B01B1ZZ FLUOROSCOPY OF SPINAL CORD USING LOW OSMOLAR CONTRAST: ICD-10-PCS | Performed by: STUDENT IN AN ORGANIZED HEALTH CARE EDUCATION/TRAINING PROGRAM

## 2022-10-18 PROCEDURE — 86140 C-REACTIVE PROTEIN: CPT | Performed by: EMERGENCY MEDICINE

## 2022-10-18 PROCEDURE — 84157 ASSAY OF PROTEIN OTHER: CPT | Performed by: EMERGENCY MEDICINE

## 2022-10-18 PROCEDURE — 80053 COMPREHEN METABOLIC PANEL: CPT | Performed by: EMERGENCY MEDICINE

## 2022-10-18 PROCEDURE — 84484 ASSAY OF TROPONIN QUANT: CPT | Performed by: EMERGENCY MEDICINE

## 2022-10-18 PROCEDURE — 87040 BLOOD CULTURE FOR BACTERIA: CPT | Performed by: EMERGENCY MEDICINE

## 2022-10-18 PROCEDURE — 87636 SARSCOV2 & INF A&B AMP PRB: CPT | Performed by: EMERGENCY MEDICINE

## 2022-10-18 PROCEDURE — 87086 URINE CULTURE/COLONY COUNT: CPT | Performed by: EMERGENCY MEDICINE

## 2022-10-18 PROCEDURE — 84132 ASSAY OF SERUM POTASSIUM: CPT | Performed by: NURSE PRACTITIONER

## 2022-10-18 PROCEDURE — 82945 GLUCOSE OTHER FLUID: CPT | Performed by: EMERGENCY MEDICINE

## 2022-10-18 PROCEDURE — 87186 SC STD MICRODIL/AGAR DIL: CPT | Performed by: EMERGENCY MEDICINE

## 2022-10-18 PROCEDURE — 85652 RBC SED RATE AUTOMATED: CPT | Performed by: EMERGENCY MEDICINE

## 2022-10-18 PROCEDURE — 87015 SPECIMEN INFECT AGNT CONCNTJ: CPT | Performed by: EMERGENCY MEDICINE

## 2022-10-18 PROCEDURE — 009U3ZX DRAINAGE OF SPINAL CANAL, PERCUTANEOUS APPROACH, DIAGNOSTIC: ICD-10-PCS | Performed by: STUDENT IN AN ORGANIZED HEALTH CARE EDUCATION/TRAINING PROGRAM

## 2022-10-18 PROCEDURE — 87483 CNS DNA AMP PROBE TYPE 12-25: CPT | Performed by: EMERGENCY MEDICINE

## 2022-10-18 PROCEDURE — P9612 CATHETERIZE FOR URINE SPEC: HCPCS

## 2022-10-18 PROCEDURE — 87077 CULTURE AEROBIC IDENTIFY: CPT | Performed by: EMERGENCY MEDICINE

## 2022-10-18 PROCEDURE — 87205 SMEAR GRAM STAIN: CPT | Performed by: EMERGENCY MEDICINE

## 2022-10-18 PROCEDURE — 71045 X-RAY EXAM CHEST 1 VIEW: CPT

## 2022-10-18 PROCEDURE — 81001 URINALYSIS AUTO W/SCOPE: CPT | Performed by: EMERGENCY MEDICINE

## 2022-10-18 RX ORDER — AMOXICILLIN 250 MG
2 CAPSULE ORAL 2 TIMES DAILY
Status: DISCONTINUED | OUTPATIENT
Start: 2022-10-18 | End: 2022-10-20 | Stop reason: HOSPADM

## 2022-10-18 RX ORDER — BISACODYL 10 MG
10 SUPPOSITORY, RECTAL RECTAL DAILY PRN
Status: DISCONTINUED | OUTPATIENT
Start: 2022-10-18 | End: 2022-10-20 | Stop reason: HOSPADM

## 2022-10-18 RX ORDER — MAGNESIUM SULFATE HEPTAHYDRATE 40 MG/ML
2 INJECTION, SOLUTION INTRAVENOUS AS NEEDED
Status: DISCONTINUED | OUTPATIENT
Start: 2022-10-18 | End: 2022-10-20 | Stop reason: HOSPADM

## 2022-10-18 RX ORDER — POTASSIUM CHLORIDE 1.5 G/1.77G
40 POWDER, FOR SOLUTION ORAL AS NEEDED
Status: DISCONTINUED | OUTPATIENT
Start: 2022-10-18 | End: 2022-10-20 | Stop reason: HOSPADM

## 2022-10-18 RX ORDER — ACETAMINOPHEN 160 MG/5ML
650 SOLUTION ORAL EVERY 4 HOURS PRN
Status: DISCONTINUED | OUTPATIENT
Start: 2022-10-18 | End: 2022-10-20 | Stop reason: HOSPADM

## 2022-10-18 RX ORDER — POTASSIUM CHLORIDE 750 MG/1
40 CAPSULE, EXTENDED RELEASE ORAL AS NEEDED
Status: DISCONTINUED | OUTPATIENT
Start: 2022-10-18 | End: 2022-10-20 | Stop reason: HOSPADM

## 2022-10-18 RX ORDER — SODIUM CHLORIDE 9 MG/ML
75 INJECTION, SOLUTION INTRAVENOUS CONTINUOUS
Status: ACTIVE | OUTPATIENT
Start: 2022-10-18 | End: 2022-10-19

## 2022-10-18 RX ORDER — POTASSIUM CHLORIDE 1.5 G/1.77G
40 POWDER, FOR SOLUTION ORAL ONCE
Status: COMPLETED | OUTPATIENT
Start: 2022-10-18 | End: 2022-10-18

## 2022-10-18 RX ORDER — POTASSIUM CHLORIDE 7.45 MG/ML
10 INJECTION INTRAVENOUS
Status: DISCONTINUED | OUTPATIENT
Start: 2022-10-18 | End: 2022-10-20 | Stop reason: HOSPADM

## 2022-10-18 RX ORDER — LIDOCAINE HYDROCHLORIDE 10 MG/ML
5 INJECTION, SOLUTION INFILTRATION; PERINEURAL ONCE
Status: COMPLETED | OUTPATIENT
Start: 2022-10-18 | End: 2022-10-18

## 2022-10-18 RX ORDER — SODIUM CHLORIDE 0.9 % (FLUSH) 0.9 %
10 SYRINGE (ML) INJECTION EVERY 12 HOURS SCHEDULED
Status: DISCONTINUED | OUTPATIENT
Start: 2022-10-18 | End: 2022-10-20 | Stop reason: HOSPADM

## 2022-10-18 RX ORDER — ACETAMINOPHEN 325 MG/1
650 TABLET ORAL EVERY 4 HOURS PRN
Status: DISCONTINUED | OUTPATIENT
Start: 2022-10-18 | End: 2022-10-20 | Stop reason: HOSPADM

## 2022-10-18 RX ORDER — POLYETHYLENE GLYCOL 3350 17 G/17G
17 POWDER, FOR SOLUTION ORAL DAILY PRN
Status: DISCONTINUED | OUTPATIENT
Start: 2022-10-18 | End: 2022-10-20 | Stop reason: HOSPADM

## 2022-10-18 RX ORDER — MAGNESIUM SULFATE HEPTAHYDRATE 40 MG/ML
4 INJECTION, SOLUTION INTRAVENOUS AS NEEDED
Status: DISCONTINUED | OUTPATIENT
Start: 2022-10-18 | End: 2022-10-20 | Stop reason: HOSPADM

## 2022-10-18 RX ORDER — ACETAMINOPHEN 650 MG/1
650 SUPPOSITORY RECTAL EVERY 4 HOURS PRN
Status: DISCONTINUED | OUTPATIENT
Start: 2022-10-18 | End: 2022-10-20 | Stop reason: HOSPADM

## 2022-10-18 RX ORDER — SODIUM CHLORIDE 0.9 % (FLUSH) 0.9 %
10 SYRINGE (ML) INJECTION AS NEEDED
Status: DISCONTINUED | OUTPATIENT
Start: 2022-10-18 | End: 2022-10-20 | Stop reason: HOSPADM

## 2022-10-18 RX ORDER — BISACODYL 5 MG/1
5 TABLET, DELAYED RELEASE ORAL DAILY PRN
Status: DISCONTINUED | OUTPATIENT
Start: 2022-10-18 | End: 2022-10-20 | Stop reason: HOSPADM

## 2022-10-18 RX ADMIN — ACYCLOVIR SODIUM 610 MG: 500 INJECTION, SOLUTION INTRAVENOUS at 22:09

## 2022-10-18 RX ADMIN — POTASSIUM CHLORIDE 40 MEQ: 1.5 POWDER, FOR SOLUTION ORAL at 15:21

## 2022-10-18 RX ADMIN — POTASSIUM CHLORIDE 40 MEQ: 750 CAPSULE, EXTENDED RELEASE ORAL at 20:27

## 2022-10-18 RX ADMIN — SODIUM CHLORIDE 1 G: 900 INJECTION INTRAVENOUS at 15:24

## 2022-10-18 RX ADMIN — LIDOCAINE HYDROCHLORIDE 5 ML: 10 INJECTION, SOLUTION INFILTRATION; PERINEURAL at 13:14

## 2022-10-18 RX ADMIN — SODIUM CHLORIDE 1000 ML: 9 INJECTION, SOLUTION INTRAVENOUS at 13:47

## 2022-10-18 RX ADMIN — SENNOSIDES AND DOCUSATE SODIUM 2 TABLET: 50; 8.6 TABLET ORAL at 20:27

## 2022-10-18 RX ADMIN — ACYCLOVIR SODIUM 550 MG: 500 INJECTION, SOLUTION INTRAVENOUS at 16:14

## 2022-10-18 RX ADMIN — SODIUM CHLORIDE 75 ML/HR: 9 INJECTION, SOLUTION INTRAVENOUS at 18:29

## 2022-10-18 NOTE — HOME HEALTH
Routine Visit Note:    Skill/education provided: gait training, activity tolerance interventions, strengthening exercises, transfer training    Patient/caregiver response: continues to improve tolerance to activity     Plan for next visit: continue to progress as tolerated    Other pertinent info: intermittent R knee buckling without LOB

## 2022-10-18 NOTE — TELEPHONE ENCOUNTER
Provider: PA-C, WILSON    Caller: YOHANNES ESCOBEDO    Relationship to Patient: SPOUSE    Phone Number: [phone]    Reason for Call: PT IS CURRENTLY IN ER AND WANTED TO UPDATE GREGG MCNALLY ON SITUATION. ALSO HAD SOME QUESTIONS ABOUT CATHETER AND OTHER CLINICAL QUESTIONS    NEW PATIENT APPT TODAY HAD TO BE CANCELLED DUE TO PATIENT BEING IN ER

## 2022-10-19 ENCOUNTER — HOME CARE VISIT (OUTPATIENT)
Dept: HOME HEALTH SERVICES | Facility: HOME HEALTHCARE | Age: 77
End: 2022-10-19

## 2022-10-19 LAB
ACANTHOCYTES BLD QL SMEAR: NORMAL
ANION GAP SERPL CALCULATED.3IONS-SCNC: 9 MMOL/L (ref 5–15)
ANISOCYTOSIS BLD QL: NORMAL
BASOPHILS # BLD AUTO: 0.1 10*3/MM3 (ref 0–0.2)
BASOPHILS NFR BLD AUTO: 1.7 % (ref 0–1.5)
BUN SERPL-MCNC: 7 MG/DL (ref 8–23)
BUN/CREAT SERPL: 13.5 (ref 7–25)
CALCIUM SPEC-SCNC: 8.5 MG/DL (ref 8.6–10.5)
CHLORIDE SERPL-SCNC: 102 MMOL/L (ref 98–107)
CO2 SERPL-SCNC: 23 MMOL/L (ref 22–29)
CREAT SERPL-MCNC: 0.52 MG/DL (ref 0.57–1)
DEPRECATED RDW RBC AUTO: 65.7 FL (ref 37–54)
EGFRCR SERPLBLD CKD-EPI 2021: 97 ML/MIN/1.73
EOSINOPHIL # BLD AUTO: 0.22 10*3/MM3 (ref 0–0.4)
EOSINOPHIL NFR BLD AUTO: 3.7 % (ref 0.3–6.2)
ERYTHROCYTE [DISTWIDTH] IN BLOOD BY AUTOMATED COUNT: 19.3 % (ref 12.3–15.4)
GLUCOSE BLDC GLUCOMTR-MCNC: 104 MG/DL (ref 70–130)
GLUCOSE BLDC GLUCOMTR-MCNC: 123 MG/DL (ref 70–130)
GLUCOSE BLDC GLUCOMTR-MCNC: 135 MG/DL (ref 70–130)
GLUCOSE SERPL-MCNC: 97 MG/DL (ref 65–99)
HCT VFR BLD AUTO: 30.4 % (ref 34–46.6)
HGB BLD-MCNC: 9.9 G/DL (ref 12–15.9)
IMM GRANULOCYTES # BLD AUTO: 0.01 10*3/MM3 (ref 0–0.05)
IMM GRANULOCYTES NFR BLD AUTO: 0.2 % (ref 0–0.5)
LYMPHOCYTES # BLD AUTO: 2.4 10*3/MM3 (ref 0.7–3.1)
LYMPHOCYTES NFR BLD AUTO: 40.7 % (ref 19.6–45.3)
MCH RBC QN AUTO: 30.7 PG (ref 26.6–33)
MCHC RBC AUTO-ENTMCNC: 32.6 G/DL (ref 31.5–35.7)
MCV RBC AUTO: 94.1 FL (ref 79–97)
MONOCYTES # BLD AUTO: 1.11 10*3/MM3 (ref 0.1–0.9)
MONOCYTES NFR BLD AUTO: 18.8 % (ref 5–12)
NEUTROPHILS NFR BLD AUTO: 2.06 10*3/MM3 (ref 1.7–7)
NEUTROPHILS NFR BLD AUTO: 34.9 % (ref 42.7–76)
NRBC BLD AUTO-RTO: 0 /100 WBC (ref 0–0.2)
PLAT MORPH BLD: NORMAL
PLATELET # BLD AUTO: 255 10*3/MM3 (ref 140–450)
PMV BLD AUTO: 10.9 FL (ref 6–12)
POTASSIUM SERPL-SCNC: 3.6 MMOL/L (ref 3.5–5.2)
POTASSIUM SERPL-SCNC: 4.1 MMOL/L (ref 3.5–5.2)
RBC # BLD AUTO: 3.23 10*6/MM3 (ref 3.77–5.28)
SODIUM SERPL-SCNC: 134 MMOL/L (ref 136–145)
WBC MORPH BLD: NORMAL
WBC NRBC COR # BLD: 5.9 10*3/MM3 (ref 3.4–10.8)

## 2022-10-19 PROCEDURE — 85007 BL SMEAR W/DIFF WBC COUNT: CPT | Performed by: NURSE PRACTITIONER

## 2022-10-19 PROCEDURE — 82962 GLUCOSE BLOOD TEST: CPT

## 2022-10-19 PROCEDURE — 84132 ASSAY OF SERUM POTASSIUM: CPT | Performed by: INTERNAL MEDICINE

## 2022-10-19 PROCEDURE — 25010000002 ACYCLOVIR PER 5 MG: Performed by: NURSE PRACTITIONER

## 2022-10-19 PROCEDURE — 97535 SELF CARE MNGMENT TRAINING: CPT

## 2022-10-19 PROCEDURE — 97161 PT EVAL LOW COMPLEX 20 MIN: CPT

## 2022-10-19 PROCEDURE — 97110 THERAPEUTIC EXERCISES: CPT

## 2022-10-19 PROCEDURE — 97165 OT EVAL LOW COMPLEX 30 MIN: CPT

## 2022-10-19 PROCEDURE — 99232 SBSQ HOSP IP/OBS MODERATE 35: CPT | Performed by: INTERNAL MEDICINE

## 2022-10-19 PROCEDURE — 80048 BASIC METABOLIC PNL TOTAL CA: CPT | Performed by: NURSE PRACTITIONER

## 2022-10-19 PROCEDURE — 85025 COMPLETE CBC W/AUTO DIFF WBC: CPT | Performed by: NURSE PRACTITIONER

## 2022-10-19 PROCEDURE — 0 MAGNESIUM SULFATE 4 GM/100ML SOLUTION: Performed by: INTERNAL MEDICINE

## 2022-10-19 PROCEDURE — 25010000002 CEFTRIAXONE PER 250 MG: Performed by: NURSE PRACTITIONER

## 2022-10-19 RX ORDER — PREDNISONE 1 MG/1
5 TABLET ORAL DAILY
Status: DISCONTINUED | OUTPATIENT
Start: 2022-10-20 | End: 2022-10-20 | Stop reason: HOSPADM

## 2022-10-19 RX ORDER — AMLODIPINE BESYLATE 5 MG/1
5 TABLET ORAL
Status: DISCONTINUED | OUTPATIENT
Start: 2022-10-20 | End: 2022-10-20 | Stop reason: HOSPADM

## 2022-10-19 RX ORDER — LORAZEPAM 0.5 MG/1
0.5 TABLET ORAL EVERY 8 HOURS PRN
Status: DISCONTINUED | OUTPATIENT
Start: 2022-10-19 | End: 2022-10-20 | Stop reason: HOSPADM

## 2022-10-19 RX ORDER — ATORVASTATIN CALCIUM 40 MG/1
80 TABLET, FILM COATED ORAL NIGHTLY
Status: DISCONTINUED | OUTPATIENT
Start: 2022-10-19 | End: 2022-10-20 | Stop reason: HOSPADM

## 2022-10-19 RX ORDER — METOPROLOL TARTRATE 50 MG/1
50 TABLET, FILM COATED ORAL EVERY 12 HOURS SCHEDULED
Status: DISCONTINUED | OUTPATIENT
Start: 2022-10-19 | End: 2022-10-20 | Stop reason: HOSPADM

## 2022-10-19 RX ORDER — ASPIRIN 325 MG
325 TABLET ORAL DAILY
Status: DISCONTINUED | OUTPATIENT
Start: 2022-10-20 | End: 2022-10-20 | Stop reason: HOSPADM

## 2022-10-19 RX ORDER — ACETAMINOPHEN AND CODEINE PHOSPHATE 300; 30 MG/1; MG/1
1 TABLET ORAL NIGHTLY PRN
Status: DISCONTINUED | OUTPATIENT
Start: 2022-10-19 | End: 2022-10-20 | Stop reason: HOSPADM

## 2022-10-19 RX ORDER — BETHANECHOL CHLORIDE 25 MG/1
25 TABLET ORAL 3 TIMES DAILY
Status: DISCONTINUED | OUTPATIENT
Start: 2022-10-19 | End: 2022-10-20 | Stop reason: HOSPADM

## 2022-10-19 RX ORDER — VALACYCLOVIR HYDROCHLORIDE 500 MG/1
1000 TABLET, FILM COATED ORAL EVERY 12 HOURS SCHEDULED
Status: DISCONTINUED | OUTPATIENT
Start: 2022-10-19 | End: 2022-10-20 | Stop reason: HOSPADM

## 2022-10-19 RX ORDER — LEVOTHYROXINE SODIUM 0.07 MG/1
75 TABLET ORAL
Status: DISCONTINUED | OUTPATIENT
Start: 2022-10-20 | End: 2022-10-20 | Stop reason: HOSPADM

## 2022-10-19 RX ADMIN — MAGNESIUM SULFATE HEPTAHYDRATE 4 G: 40 INJECTION, SOLUTION INTRAVENOUS at 02:53

## 2022-10-19 RX ADMIN — ACYCLOVIR SODIUM 610 MG: 500 INJECTION, SOLUTION INTRAVENOUS at 05:54

## 2022-10-19 RX ADMIN — METOPROLOL TARTRATE 50 MG: 50 TABLET, FILM COATED ORAL at 20:06

## 2022-10-19 RX ADMIN — SODIUM CHLORIDE 75 ML/HR: 9 INJECTION, SOLUTION INTRAVENOUS at 11:51

## 2022-10-19 RX ADMIN — ACETAMINOPHEN 650 MG: 325 TABLET, FILM COATED ORAL at 08:19

## 2022-10-19 RX ADMIN — POTASSIUM CHLORIDE 40 MEQ: 750 CAPSULE, EXTENDED RELEASE ORAL at 12:26

## 2022-10-19 RX ADMIN — Medication 10 ML: at 08:12

## 2022-10-19 RX ADMIN — POTASSIUM CHLORIDE 40 MEQ: 750 CAPSULE, EXTENDED RELEASE ORAL at 01:34

## 2022-10-19 RX ADMIN — SODIUM CHLORIDE 1 G: 900 INJECTION INTRAVENOUS at 08:12

## 2022-10-19 RX ADMIN — ACETAMINOPHEN 650 MG: 325 TABLET, FILM COATED ORAL at 01:43

## 2022-10-19 RX ADMIN — VALACYCLOVIR HYDROCHLORIDE 1000 MG: 500 TABLET, FILM COATED ORAL at 20:06

## 2022-10-19 RX ADMIN — ACETAMINOPHEN 650 MG: 325 TABLET, FILM COATED ORAL at 15:11

## 2022-10-19 RX ADMIN — ATORVASTATIN CALCIUM 80 MG: 40 TABLET, FILM COATED ORAL at 20:05

## 2022-10-19 RX ADMIN — BETHANECHOL CHLORIDE 25 MG: 25 TABLET ORAL at 20:09

## 2022-10-19 RX ADMIN — POTASSIUM CHLORIDE 40 MEQ: 750 CAPSULE, EXTENDED RELEASE ORAL at 16:35

## 2022-10-19 RX ADMIN — ACETAMINOPHEN AND CODEINE PHOSPHATE 1 TABLET: 300; 30 TABLET ORAL at 20:06

## 2022-10-19 NOTE — HOME HEALTH
Patient went to the [facility] ER with no call to home health for confusion. Patient was diagnosed with acute encephalopthy and UTI.

## 2022-10-20 ENCOUNTER — TELEPHONE (OUTPATIENT)
Dept: UROLOGY | Facility: CLINIC | Age: 77
End: 2022-10-20

## 2022-10-20 VITALS
OXYGEN SATURATION: 96 % | DIASTOLIC BLOOD PRESSURE: 72 MMHG | WEIGHT: 133.2 LBS | TEMPERATURE: 97.9 F | RESPIRATION RATE: 16 BRPM | SYSTOLIC BLOOD PRESSURE: 147 MMHG | BODY MASS INDEX: 22.74 KG/M2 | HEIGHT: 64 IN | HEART RATE: 109 BPM

## 2022-10-20 LAB
ANION GAP SERPL CALCULATED.3IONS-SCNC: 12 MMOL/L (ref 5–15)
BASOPHILS # BLD AUTO: 0.13 10*3/MM3 (ref 0–0.2)
BASOPHILS NFR BLD AUTO: 1.7 % (ref 0–1.5)
BUN SERPL-MCNC: 6 MG/DL (ref 8–23)
BUN/CREAT SERPL: 10.7 (ref 7–25)
CALCIUM SPEC-SCNC: 8.4 MG/DL (ref 8.6–10.5)
CHLORIDE SERPL-SCNC: 101 MMOL/L (ref 98–107)
CO2 SERPL-SCNC: 18 MMOL/L (ref 22–29)
CREAT SERPL-MCNC: 0.56 MG/DL (ref 0.57–1)
DEPRECATED RDW RBC AUTO: 67.3 FL (ref 37–54)
EGFRCR SERPLBLD CKD-EPI 2021: 95.3 ML/MIN/1.73
EOSINOPHIL # BLD AUTO: 0.45 10*3/MM3 (ref 0–0.4)
EOSINOPHIL NFR BLD AUTO: 6 % (ref 0.3–6.2)
ERYTHROCYTE [DISTWIDTH] IN BLOOD BY AUTOMATED COUNT: 19.4 % (ref 12.3–15.4)
GLUCOSE SERPL-MCNC: 98 MG/DL (ref 65–99)
HCT VFR BLD AUTO: 30.8 % (ref 34–46.6)
HGB BLD-MCNC: 10 G/DL (ref 12–15.9)
IMM GRANULOCYTES # BLD AUTO: 0.02 10*3/MM3 (ref 0–0.05)
IMM GRANULOCYTES NFR BLD AUTO: 0.3 % (ref 0–0.5)
LYMPHOCYTES # BLD AUTO: 2.36 10*3/MM3 (ref 0.7–3.1)
LYMPHOCYTES NFR BLD AUTO: 31.3 % (ref 19.6–45.3)
MCH RBC QN AUTO: 30.6 PG (ref 26.6–33)
MCHC RBC AUTO-ENTMCNC: 32.5 G/DL (ref 31.5–35.7)
MCV RBC AUTO: 94.2 FL (ref 79–97)
MONOCYTES # BLD AUTO: 1.28 10*3/MM3 (ref 0.1–0.9)
MONOCYTES NFR BLD AUTO: 17 % (ref 5–12)
NEUTROPHILS NFR BLD AUTO: 3.29 10*3/MM3 (ref 1.7–7)
NEUTROPHILS NFR BLD AUTO: 43.7 % (ref 42.7–76)
NRBC BLD AUTO-RTO: 0 /100 WBC (ref 0–0.2)
PLATELET # BLD AUTO: 256 10*3/MM3 (ref 140–450)
PMV BLD AUTO: 10.7 FL (ref 6–12)
POTASSIUM SERPL-SCNC: 4.9 MMOL/L (ref 3.5–5.2)
RBC # BLD AUTO: 3.27 10*6/MM3 (ref 3.77–5.28)
SODIUM SERPL-SCNC: 131 MMOL/L (ref 136–145)
WBC NRBC COR # BLD: 7.53 10*3/MM3 (ref 3.4–10.8)

## 2022-10-20 PROCEDURE — 99238 HOSP IP/OBS DSCHRG MGMT 30/<: CPT | Performed by: INTERNAL MEDICINE

## 2022-10-20 PROCEDURE — 25010000002 CEFTRIAXONE PER 250 MG: Performed by: NURSE PRACTITIONER

## 2022-10-20 PROCEDURE — 85025 COMPLETE CBC W/AUTO DIFF WBC: CPT | Performed by: INTERNAL MEDICINE

## 2022-10-20 PROCEDURE — 80048 BASIC METABOLIC PNL TOTAL CA: CPT | Performed by: INTERNAL MEDICINE

## 2022-10-20 PROCEDURE — 63710000001 PREDNISONE PER 5 MG: Performed by: INTERNAL MEDICINE

## 2022-10-20 RX ORDER — VALACYCLOVIR HYDROCHLORIDE 500 MG/1
TABLET, FILM COATED ORAL
Qty: 70 TABLET | Refills: 1 | Status: SHIPPED | OUTPATIENT
Start: 2022-10-20

## 2022-10-20 RX ORDER — CEFDINIR 300 MG/1
300 CAPSULE ORAL 2 TIMES DAILY
Qty: 10 CAPSULE | Refills: 0 | Status: SHIPPED | OUTPATIENT
Start: 2022-10-21 | End: 2022-10-26

## 2022-10-20 RX ORDER — SACCHAROMYCES BOULARDII 250 MG
250 CAPSULE ORAL 2 TIMES DAILY
Qty: 10 CAPSULE | Refills: 0 | Status: SHIPPED | OUTPATIENT
Start: 2022-10-20 | End: 2022-10-25

## 2022-10-20 RX ADMIN — SODIUM CHLORIDE 1 G: 900 INJECTION INTRAVENOUS at 08:35

## 2022-10-20 RX ADMIN — LEVOTHYROXINE SODIUM 75 MCG: 0.07 TABLET ORAL at 05:00

## 2022-10-20 RX ADMIN — ACETAMINOPHEN 650 MG: 325 TABLET, FILM COATED ORAL at 04:59

## 2022-10-20 RX ADMIN — Medication 10 ML: at 08:36

## 2022-10-20 RX ADMIN — METOPROLOL TARTRATE 50 MG: 50 TABLET, FILM COATED ORAL at 08:34

## 2022-10-20 RX ADMIN — VALACYCLOVIR HYDROCHLORIDE 1000 MG: 500 TABLET, FILM COATED ORAL at 08:34

## 2022-10-20 RX ADMIN — AMLODIPINE BESYLATE 5 MG: 5 TABLET ORAL at 08:34

## 2022-10-20 RX ADMIN — BETHANECHOL CHLORIDE 25 MG: 25 TABLET ORAL at 08:34

## 2022-10-20 RX ADMIN — PREDNISONE 5 MG: 5 TABLET ORAL at 08:34

## 2022-10-20 RX ADMIN — ASPIRIN 325 MG: 325 TABLET ORAL at 08:34

## 2022-10-21 ENCOUNTER — HOME CARE VISIT (OUTPATIENT)
Dept: HOME HEALTH SERVICES | Facility: HOME HEALTHCARE | Age: 77
End: 2022-10-21

## 2022-10-21 ENCOUNTER — OFFICE VISIT (OUTPATIENT)
Dept: UROLOGY | Facility: CLINIC | Age: 77
End: 2022-10-21

## 2022-10-21 ENCOUNTER — READMISSION MANAGEMENT (OUTPATIENT)
Dept: CALL CENTER | Facility: HOSPITAL | Age: 77
End: 2022-10-21

## 2022-10-21 VITALS
DIASTOLIC BLOOD PRESSURE: 70 MMHG | SYSTOLIC BLOOD PRESSURE: 122 MMHG | OXYGEN SATURATION: 98 % | TEMPERATURE: 97.3 F | HEART RATE: 92 BPM | RESPIRATION RATE: 16 BRPM

## 2022-10-21 VITALS
TEMPERATURE: 97.7 F | BODY MASS INDEX: 22.71 KG/M2 | WEIGHT: 133 LBS | DIASTOLIC BLOOD PRESSURE: 60 MMHG | HEART RATE: 99 BPM | HEIGHT: 64 IN | OXYGEN SATURATION: 99 % | SYSTOLIC BLOOD PRESSURE: 132 MMHG | RESPIRATION RATE: 16 BRPM

## 2022-10-21 DIAGNOSIS — G83.4 CAUDA EQUINA COMPRESSION: Primary | ICD-10-CM

## 2022-10-21 LAB
BACTERIA SPEC AEROBE CULT: ABNORMAL
BACTERIA SPEC AEROBE CULT: ABNORMAL
BACTERIA SPEC AEROBE CULT: NORMAL
GRAM STN SPEC: NORMAL
GRAM STN SPEC: NORMAL

## 2022-10-21 PROCEDURE — 99204 OFFICE O/P NEW MOD 45 MIN: CPT | Performed by: PHYSICIAN ASSISTANT

## 2022-10-21 PROCEDURE — G0495 RN CARE TRAIN/EDU IN HH: HCPCS

## 2022-10-21 NOTE — OUTREACH NOTE
Prep Survey    Flowsheet Row Responses   Scientologist facility patient discharged from? [facility]   Is LACE score < 7 ? No   Emergency Room discharge w/ pulse ox? No   Eligibility Readm Mgmt   Discharge diagnosis Acute encephalopathy   Does the patient have one of the following disease processes/diagnoses(primary or secondary)? Other   Does the patient have Home health ordered? Yes   What is the Home health agency?  [facility]   Is there a DME ordered? No   Prep survey completed? Yes          FAVIAN MÁRQUEZ - Registered Nurse

## 2022-10-21 NOTE — PROGRESS NOTES
Chief Complaint  Chief Complaint   Patient presents with   • Urinary Retention     New Patient        Referring Provider:  No ref. provider found    HPI  Ms. Villavicencio is a 77 y.o. female with hx of spinal stenosis, RA who presents with urinary retention.    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  She was noted to have intermittent difficulty with urination, elevated PVR, and sorenson catheter was anchored. It was retained until 10/20 and removed during repeat hospital stay. They have been performing I&O, as well as voiding. She denies any previous history of this. Denies hematuria. Denies narrow stream or spraying of stream prior to this.       Denies history of symptoms of pelvic organ prolapse    Past Medical History  Past Medical History:   Diagnosis Date   • Asthma    • Benign hypertension    • Disease of thyroid gland    • Family history of heart disease    • Family history of heart disease    • Hypercholesterolemia    • Rheumatoid arthritis (HCC)    • Rheumatoid arthritis (HCC)    • Supraventricular tachycardia (HCC)     first diagnosed 06/01/2012   • Supraventricular tachycardia (HCC)        Past Surgical History  Past Surgical History:   Procedure Laterality Date   • CARDIAC CATHETERIZATION N/A 7/10/2020    Procedure: Left Heart Cath;  Surgeon: Pankaj Pollard MD;  Location: [facility] CATH INVASIVE LOCATION;  Service: Cardiology;  Laterality: N/A;   • SINUS SURGERY         Medications    Current Outpatient Medications:   •  acetaminophen (TYLENOL) 325 MG tablet, Take 2 tablets by mouth Every 6 (Six) Hours As Needed for Mild Pain., Disp: , Rfl:   •  amLODIPine (NORVASC) 5 MG tablet, Take 1 tablet by mouth Daily., Disp: , Rfl:   •  aspirin 325 MG tablet, Take 1 tablet by mouth Daily., Disp: , Rfl:   •  atorvastatin (LIPITOR) 80 MG tablet, Take 1 tablet by mouth Every Night., Disp: 90 tablet, Rfl:   •  bethanechol (URECHOLINE) 25 MG tablet, Take 25 mg by mouth 3 (Three)  "Times a Day., Disp: , Rfl:   •  cefdinir (OMNICEF) 300 MG capsule, Take 1 capsule by mouth 2 (Two) Times a Day for 5 days., Disp: 10 capsule, Rfl: 0  •  cholecalciferol (VITAMIN D3) 25 MCG (1000 UT) tablet, Take 2,000 Units by mouth Daily., Disp: , Rfl:   •  fluticasone (FLONASE) 50 MCG/ACT nasal spray, 2 sprays into each nostril as needed. Administer 2 sprays in each nostril for each dose. , Disp: , Rfl:   •  leflunomide (ARAVA) 20 MG tablet, Take 1 tablet by mouth Daily., Disp: , Rfl:   •  levothyroxine (SYNTHROID, LEVOTHROID) 75 MCG tablet, Take 75 mcg by mouth daily., Disp: , Rfl:   •  LORazepam (ATIVAN) 0.5 MG tablet, Take 0.5 mg by mouth every 8 (eight) hours as needed for anxiety., Disp: , Rfl:   •  metoprolol tartrate (LOPRESSOR) 50 MG tablet, Take 1 tablet by mouth Every 12 (Twelve) Hours., Disp: , Rfl:   •  Multiple Vitamins-Minerals ([employer] BONE HEALTH PO), Take 1 tablet by mouth daily., Disp: , Rfl:   •  predniSONE (DELTASONE) 5 MG tablet, Take 1 tablet by mouth Daily., Disp: , Rfl:   •  saccharomyces boulardii (Florastor) 250 MG capsule, Take 1 capsule by mouth 2 (Two) Times a Day for 5 days., Disp: 10 capsule, Rfl: 0  •  valACYclovir (Valtrex) 500 MG tablet, Take two tablets by mouth twice daily for 1 week then decrease to one tablet by mouth twice daily., Disp: 70 tablet, Rfl: 1    Allergies  No Known Allergies    Social History  Social History     Socioeconomic History   • Marital status:    Tobacco Use   • Smoking status: Former     Packs/day: 2.00     Types: Cigarettes     Quit date: 1970     Years since quittin.8   • Smokeless tobacco: Never   Vaping Use   • Vaping Use: Never used   Substance and Sexual Activity   • Alcohol use: No   • Drug use: No   • Sexual activity: Defer         Physical Exam  Visit Vitals  /60 (BP Location: Right arm, Patient Position: Sitting, Cuff Size: Adult)   Pulse 99   Temp 97.7 °F (36.5 °C) (Temporal)   Resp 16   Ht 162.6 cm (64\")   Wt 60.3 kg (133 " lb)   SpO2 99%   BMI 22.83 kg/m²         Labs  Brief Urine Lab Results  (Last result in the past 365 days)      Color   Clarity   Blood   Leuk Est   Nitrite   Protein   CREAT   Urine HCG        10/18/22 0946 Yellow   Slightly Cloudy   Negative   Large (3+)   Positive   Trace                 Urine Culture    Urine Culture 8/7/22 10/18/22 10/18/22     0946 0946   Urine Culture >100,000 CFU/mL Escherichia coli (A) 50,000 CFU/mL Pseudomonas aeruginosa (A) (C) 50,000 CFU/mL Escherichia coli (A) (C)   (A) Abnormal value (C) Corrected value               Lab Results   Component Value Date    GLUCOSE 98 10/20/2022    CALCIUM 8.4 (L) 10/20/2022     (L) 10/20/2022    K 4.9 10/20/2022    CO2 18.0 (L) 10/20/2022     10/20/2022    BUN 6 (L) 10/20/2022    CREATININE 0.56 (L) 10/20/2022    EGFRIFNONA 43 (L) 08/19/2021    BCR 10.7 10/20/2022    ANIONGAP 12.0 10/20/2022       Lab Results   Component Value Date    WBC 7.53 10/20/2022    HGB 10.0 (L) 10/20/2022    HCT 30.8 (L) 10/20/2022    MCV 94.2 10/20/2022     10/20/2022         Radiographic Studies  Adult Transthoracic Echo Complete W/ Cont if Necessary Per Protocol (With Agitated Saline)  Addendum Date: 9/10/2022    · Left ventricular systolic function is hyperdynamic (EF > 70%). · Left ventricular diastolic function is consistent with (grade I) impaired relaxation. · The cardiac valves are anatomically and functionally normal. · Nondiagnostic bubble study      CT Angiogram Neck  Result Date: 9/9/2022   1. Minimal atherosclerotic plaque without evidence of stenosis, occlusion, aneurysm or dissection in the neck or the head. 2. Cervical degenerative changes. 3. 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  This report was finalized on 9/9/2022 6:57 PM by Omar Hughes MD.      MRI Brain Without Contrast  Result Date: 9/10/2022  1. No acute infarct. 2. Mild changes chronic small vessel ischemic disease. Volume loss. 3. Severe  right-sided paranasal sinus disease. Mastoid effusions.  Electronically signed by:  Huey Briones M.D.  9/10/2022 1:47 AM Mountain Time    MRI Lumbar Spine Without Contrast  Result Date: 9/10/2022  1.  Severe degenerative changes of the lumbar spine. Moderate scoliosis. 2.  At L2-L3, there is severe spinal canal stenosis with crowding/compression of the cauda equina nerve roots due to a circumferential disc extrusion and facet arthropathy. There is also severe right foraminal narrowing. 3.  At L4-L5, there is severe spinal canal stenosis due to grade 2 anterolisthesis and facet arthropathy. There is also severe left foraminal narrowing. 4.  Additional milder degenerative changes detailed above. Electronically signed by:  Jose Alfredo Finch DO  9/9/2022 11:09 PM Mountain Time      IR LUMBAR PUNCTURE DIAGNOSTIC  Result Date: 10/18/2022  Successful fluoroscopic guided lumbar puncture as above with no immediate complications.    This report was finalized on 10/18/2022 3:39 PM by Ceasar Nicole.      IR LUMBAR PUNCTURE DIAGNOSTIC  Result Date: 9/12/2022  Successful fluoroscopic guided lumbar puncture as above with no immediate complications.    This report was finalized on 9/12/2022 4:20 PM by Ceasar Nicole.        CT Head Without Contrast Stroke Protocol  Result Date: 9/9/2022   1. No acute intracranial abnormality. 2. Mild chronic small vessel ischemic change. 3. Severe obstructive right-sided paranasal sinus mucosal disease.  This report was finalized on 9/9/2022 6:14 PM by Omar Huhges MD.      CT Angiogram Head w AI Analysis of LVO  Result Date: 9/9/2022   1. Minimal atherosclerotic plaque without evidence of stenosis, occlusion, aneurysm or dissection in the neck or the head. 2. Cervical degenerative changes. 3. 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  This report was finalized on 9/9/2022 6:57 PM by Omar Hughes MD.      CT CEREBRAL PERFUSION WITH & WITHOUT  "CONTRAST  Result Date: 9/9/2022  No perfusion abnormalities in the brain. No evidence of brain risk or cortical infarct.  This report was finalized on 9/9/2022 6:46 PM by Omar Hughes MD.          PVR  Post-void residual performed with ultrasound scanner by staff and interpreted by me - 124cc      Assessment  Ms. 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  Her PVR today reveals mild retention of 124cc.  She is currently completing a course of cefdinir for suspected UTI during her recent ER visit on October 18 for return of AMS.  She is currently feeling better and happy to be at home.  Urine culture from October 18 grew 50,000 CFU of Pseudomonas and E. coli, fortunately both sensitive to cephalosporins.    We discussed ongoing in and out catheterizing.  We recommend performing this at least nightly to protect kidney function and the patient is willing to do that.    She denies any previous history of urinary retention.  Notably, during her initial hospitalization for altered mental status, MRI of the L-spine revealed crowding of the cauda equina:  \"1.  Severe degenerative changes of the lumbar spine. Moderate scoliosis.  2.  At L2-L3, there is severe spinal canal stenosis with crowding/compression of the cauda equina nerve roots due to a circumferential disc extrusion and facet arthropathy. There is also severe right foraminal narrowing.  3.  At L4-L5, there is severe spinal canal stenosis due to grade 2 anterolisthesis and facet arthropathy. There is also severe left foraminal narrowing.  4.  Additional milder degenerative changes detailed above.\"      Plan  1.  Urinary retention   -Multiple etiologies possible including pelvic organ prolapse, cauda equina, UTI, decreased mobility and bowel function while hospitalized   -Obtain urodynamics   -Start nightly CIC with catheter output diary  F/U with Dr. Shelby to discuss UDS results       Bee Villavicencio PA-C    "

## 2022-10-21 NOTE — HOME HEALTH
YUAN: Patient admitted for UTI. D/C'd on 10.20.22 from [facility]. .Pt saw urologist this am. cath d/c'd at hospital and pt is voiding well. Pt has urodynamic study next tues. Dr boyd monday and thursday. sn to see next wed.

## 2022-10-23 LAB
BACTERIA SPEC AEROBE CULT: NORMAL
BACTERIA SPEC AEROBE CULT: NORMAL

## 2022-10-24 VITALS
RESPIRATION RATE: 16 BRPM | SYSTOLIC BLOOD PRESSURE: 121 MMHG | HEART RATE: 79 BPM | OXYGEN SATURATION: 93 % | DIASTOLIC BLOOD PRESSURE: 69 MMHG

## 2022-10-24 LAB
FUNGUS WND CULT: NORMAL
MYCOBACTERIUM SPEC CULT: NORMAL
NIGHT BLUE STAIN TISS: NORMAL

## 2022-10-24 NOTE — HOME HEALTH
Routine Visit Note: OT ROUTINE VISIT    Skill/education provided: OT PROVIDED SKILLED INSTRUCTION ON SAFE FUNCTIONAL BATHROOM TRANSFERS AND AE NEEDS.    Patient/caregiver response: PT/ VERBALIZE OR DEMO UNDERSTAINDING OF BATHROOM TRANSFERS AND ORDERED RECOMMENDED EQUIPMENT FOR SETUP IN HOME.    Plan for next visit: ASSIST WITH AE SETUP AND ADL RETRAINING.

## 2022-10-25 ENCOUNTER — OFFICE VISIT (OUTPATIENT)
Dept: UROLOGY | Facility: CLINIC | Age: 77
End: 2022-10-25
Payer: MEDICARE

## 2022-10-25 DIAGNOSIS — R33.9 URINARY RETENTION: ICD-10-CM

## 2022-10-26 ENCOUNTER — TELEPHONE (OUTPATIENT)
Dept: UROLOGY | Facility: CLINIC | Age: 77
End: 2022-10-26

## 2022-10-26 ENCOUNTER — HOME CARE VISIT (OUTPATIENT)
Dept: HOME HEALTH SERVICES | Facility: HOME HEALTHCARE | Age: 77
End: 2022-10-26

## 2022-10-26 ENCOUNTER — LAB REQUISITION (OUTPATIENT)
Dept: LAB | Facility: HOSPITAL | Age: 77
End: 2022-10-26

## 2022-10-26 VITALS
RESPIRATION RATE: 16 BRPM | SYSTOLIC BLOOD PRESSURE: 128 MMHG | DIASTOLIC BLOOD PRESSURE: 66 MMHG | TEMPERATURE: 98.2 F | HEART RATE: 80 BPM | OXYGEN SATURATION: 96 %

## 2022-10-26 VITALS
OXYGEN SATURATION: 96 % | SYSTOLIC BLOOD PRESSURE: 128 MMHG | RESPIRATION RATE: 16 BRPM | HEART RATE: 80 BPM | DIASTOLIC BLOOD PRESSURE: 66 MMHG

## 2022-10-26 DIAGNOSIS — T83.511A INFECTION AND INFLAMMATORY REACTION DUE TO INDWELLING URETHRAL CATHETER, INITIAL ENCOUNTER: ICD-10-CM

## 2022-10-26 LAB
ALBUMIN SERPL-MCNC: 3.4 G/DL (ref 3.5–5.2)
ALBUMIN/GLOB SERPL: 1.4 G/DL
ALP SERPL-CCNC: 38 U/L (ref 39–117)
ALT SERPL W P-5'-P-CCNC: 23 U/L (ref 1–33)
ANION GAP SERPL CALCULATED.3IONS-SCNC: 11.7 MMOL/L (ref 5–15)
AST SERPL-CCNC: 25 U/L (ref 1–32)
BASOPHILS # BLD AUTO: 0.12 10*3/MM3 (ref 0–0.2)
BASOPHILS NFR BLD AUTO: 1.8 % (ref 0–1.5)
BILIRUB SERPL-MCNC: 0.3 MG/DL (ref 0–1.2)
BUN SERPL-MCNC: 10 MG/DL (ref 8–23)
BUN/CREAT SERPL: 14.3 (ref 7–25)
CALCIUM SPEC-SCNC: 8.6 MG/DL (ref 8.6–10.5)
CHLORIDE SERPL-SCNC: 93 MMOL/L (ref 98–107)
CO2 SERPL-SCNC: 25.3 MMOL/L (ref 22–29)
CREAT SERPL-MCNC: 0.7 MG/DL (ref 0.57–1)
DEPRECATED RDW RBC AUTO: 66.3 FL (ref 37–54)
EGFRCR SERPLBLD CKD-EPI 2021: 90.3 ML/MIN/1.73
EOSINOPHIL # BLD AUTO: 0.33 10*3/MM3 (ref 0–0.4)
EOSINOPHIL NFR BLD AUTO: 5 % (ref 0.3–6.2)
ERYTHROCYTE [DISTWIDTH] IN BLOOD BY AUTOMATED COUNT: 19.5 % (ref 12.3–15.4)
GLOBULIN UR ELPH-MCNC: 2.5 GM/DL
GLUCOSE SERPL-MCNC: 106 MG/DL (ref 65–99)
HCT VFR BLD AUTO: 25.7 % (ref 34–46.6)
HGB BLD-MCNC: 8.4 G/DL (ref 12–15.9)
IMM GRANULOCYTES # BLD AUTO: 0.02 10*3/MM3 (ref 0–0.05)
IMM GRANULOCYTES NFR BLD AUTO: 0.3 % (ref 0–0.5)
LYMPHOCYTES # BLD AUTO: 1.79 10*3/MM3 (ref 0.7–3.1)
LYMPHOCYTES NFR BLD AUTO: 27.2 % (ref 19.6–45.3)
MCH RBC QN AUTO: 30.8 PG (ref 26.6–33)
MCHC RBC AUTO-ENTMCNC: 32.7 G/DL (ref 31.5–35.7)
MCV RBC AUTO: 94.1 FL (ref 79–97)
MONOCYTES # BLD AUTO: 0.8 10*3/MM3 (ref 0.1–0.9)
MONOCYTES NFR BLD AUTO: 12.2 % (ref 5–12)
NEUTROPHILS NFR BLD AUTO: 3.52 10*3/MM3 (ref 1.7–7)
NEUTROPHILS NFR BLD AUTO: 53.5 % (ref 42.7–76)
NRBC BLD AUTO-RTO: 0 /100 WBC (ref 0–0.2)
PLATELET # BLD AUTO: 261 10*3/MM3 (ref 140–450)
PMV BLD AUTO: 10.8 FL (ref 6–12)
POTASSIUM SERPL-SCNC: 3.3 MMOL/L (ref 3.5–5.2)
PROT SERPL-MCNC: 5.9 G/DL (ref 6–8.5)
RBC # BLD AUTO: 2.73 10*6/MM3 (ref 3.77–5.28)
SODIUM SERPL-SCNC: 130 MMOL/L (ref 136–145)
TSH SERPL DL<=0.05 MIU/L-ACNC: 1.73 UIU/ML (ref 0.27–4.2)
WBC NRBC COR # BLD: 6.58 10*3/MM3 (ref 3.4–10.8)

## 2022-10-26 PROCEDURE — 84443 ASSAY THYROID STIM HORMONE: CPT | Performed by: INTERNAL MEDICINE

## 2022-10-26 PROCEDURE — G0299 HHS/HOSPICE OF RN EA 15 MIN: HCPCS

## 2022-10-26 PROCEDURE — G0151 HHCP-SERV OF PT,EA 15 MIN: HCPCS

## 2022-10-26 PROCEDURE — G0152 HHCP-SERV OF OT,EA 15 MIN: HCPCS

## 2022-10-26 PROCEDURE — 80053 COMPREHEN METABOLIC PANEL: CPT | Performed by: INTERNAL MEDICINE

## 2022-10-26 PROCEDURE — 85025 COMPLETE CBC W/AUTO DIFF WBC: CPT | Performed by: INTERNAL MEDICINE

## 2022-10-26 NOTE — TELEPHONE ENCOUNTER
Caller: PREETI    Relationship to patient:  HOME HEALTH PROVIDER    Best call back number: [phone]    Patient is needing: PREETI WAS LEAVING PT'S HOME. SHE STATES THAT PT'S  PICKED UP SOME CATHETERS FROM THE OFFICE AND SHE HAS SOME CONCERNS REGARDING THEM. THEY'RE DISPOSABLE, OVER THE TOILET ONES AND PT'S FAMILY IS CONCERNED ABOUT HER FREQUENT UTI'S AND IF THAT IS SANITARY FOR THE PT TO DO (IF THE PT IS EVEN ABLE TO INSERT THEM.) PT'S  HAD TO WATCH VIDEOS ON [employer] TO EVEN FIGURE OUT HOW TO USE THEM.     PREETI IS ASKING IF IT WOULD BE BETTER FOR THE PT TO USE A PUREWICK EXTERNAL DEVICE AT NIGHT SO THE PT DOESN'T RISK GETTING OUT OF BED AND FALLING DURING THE NIGHT.    PREETI WILL BE UNAVAILABLE FOR A WHILE, SHE'S GOING INTO A START OF CARE APPT. SHE'S ASKING IF SOMEONE CAN CALL THE PT'S  TO DISCUSS. HIS NUMBER -[phone].

## 2022-10-27 ENCOUNTER — LAB (OUTPATIENT)
Dept: LAB | Facility: HOSPITAL | Age: 77
End: 2022-10-27

## 2022-10-27 ENCOUNTER — TRANSCRIBE ORDERS (OUTPATIENT)
Dept: LAB | Facility: HOSPITAL | Age: 77
End: 2022-10-27

## 2022-10-27 DIAGNOSIS — E87.1 HYPOSMOLALITY SYNDROME: ICD-10-CM

## 2022-10-27 DIAGNOSIS — E87.6 HYPOPOTASSEMIA: ICD-10-CM

## 2022-10-27 DIAGNOSIS — B02.1 HERPES ZOSTER WITH MENINGITIS: Primary | ICD-10-CM

## 2022-10-27 DIAGNOSIS — B02.1 HERPES ZOSTER WITH MENINGITIS: ICD-10-CM

## 2022-10-27 LAB
BACTERIA UR QL AUTO: ABNORMAL /HPF
BILIRUB UR QL STRIP: NEGATIVE
CLARITY UR: CLEAR
COLOR UR: YELLOW
GLUCOSE UR STRIP-MCNC: NEGATIVE MG/DL
HGB UR QL STRIP.AUTO: NEGATIVE
HYALINE CASTS UR QL AUTO: ABNORMAL /LPF
KETONES UR QL STRIP: ABNORMAL
LEUKOCYTE ESTERASE UR QL STRIP.AUTO: NEGATIVE
NITRITE UR QL STRIP: NEGATIVE
PH UR STRIP.AUTO: 6.5 [PH] (ref 5–8)
PROT UR QL STRIP: NEGATIVE
RBC # UR STRIP: ABNORMAL /HPF
REF LAB TEST METHOD: ABNORMAL
SP GR UR STRIP: 1.02 (ref 1–1.03)
SQUAMOUS #/AREA URNS HPF: ABNORMAL /HPF
UROBILINOGEN UR QL STRIP: ABNORMAL
WBC # UR STRIP: ABNORMAL /HPF

## 2022-10-27 PROCEDURE — 87086 URINE CULTURE/COLONY COUNT: CPT

## 2022-10-27 PROCEDURE — 81001 URINALYSIS AUTO W/SCOPE: CPT

## 2022-10-27 NOTE — HOME HEALTH
Mrs. Villavicencio is a current patient of [facility] who was recently re-hospitalized secondary to a UTI. During evaluation, pt able to perform 2x5 STS and ambulate 30 ft with walker and CGA, no knee buckling observed. Reviewed HEP with pt and family. Based on her current status, pt would benefit from continued skilled  PT.

## 2022-10-27 NOTE — TELEPHONE ENCOUNTER
"Left a voicemail for \"stephanie\" at the listed number. I and O catheter was prescribe due to retention. Happy to discuss this further with Stephanie.    Spoke with Theron Villavicencio and clarified how to catheterize and recommended they come to the office any time for support/teaching.   "

## 2022-10-28 LAB — BACTERIA SPEC AEROBE CULT: NO GROWTH

## 2022-10-30 VITALS
RESPIRATION RATE: 16 BRPM | SYSTOLIC BLOOD PRESSURE: 120 MMHG | OXYGEN SATURATION: 96 % | DIASTOLIC BLOOD PRESSURE: 65 MMHG | HEART RATE: 71 BPM

## 2022-10-31 ENCOUNTER — HOME CARE VISIT (OUTPATIENT)
Dept: HOME HEALTH SERVICES | Facility: HOME HEALTHCARE | Age: 77
End: 2022-10-31

## 2022-10-31 VITALS
TEMPERATURE: 98.1 F | SYSTOLIC BLOOD PRESSURE: 142 MMHG | HEART RATE: 82 BPM | DIASTOLIC BLOOD PRESSURE: 80 MMHG | RESPIRATION RATE: 16 BRPM | OXYGEN SATURATION: 98 %

## 2022-10-31 PROCEDURE — G0495 RN CARE TRAIN/EDU IN HH: HCPCS

## 2022-10-31 NOTE — HOME HEALTH
Routine Visit Note: OT EVAL VISIT    Skill/education provided: OT PROVIDED SKILLED INSTRUCTION ON SAFE FUNCTIONAL BATHROOM TRANSFERS USING AE/AT AND EC/WS. PT/ DEMO UNDERSTAINDING.    Patient/caregiver response: PT TOLERATED TX WELL. PT/CAREGIVER IN AGREEMENT WITH POC.    Plan for next visit: HEP, TRANSFER AND ADL RETRAINING.

## 2022-10-31 NOTE — HOME HEALTH
"sn visit. Pt reports \"doing well\". no f/u appts for a couple weeks. plan for sn d/c next week. ID did urine specimen last week. Awaiting culture."

## 2022-11-01 ENCOUNTER — HOME CARE VISIT (OUTPATIENT)
Dept: HOME HEALTH SERVICES | Facility: HOME HEALTHCARE | Age: 77
End: 2022-11-01

## 2022-11-01 VITALS
RESPIRATION RATE: 16 BRPM | SYSTOLIC BLOOD PRESSURE: 151 MMHG | DIASTOLIC BLOOD PRESSURE: 89 MMHG | HEART RATE: 84 BPM | OXYGEN SATURATION: 95 %

## 2022-11-01 PROCEDURE — G0151 HHCP-SERV OF PT,EA 15 MIN: HCPCS

## 2022-11-01 NOTE — PROGRESS NOTES
"Enter Query Response Below      Query Response:     Lactic acidosis-clinically insignificant  Electronically signed by Gena Calvillo MD, 22, 9:08 AM EDT.          If applicable, please update the problem list.     Patient: Vane Villavicencio        : 1945  Account: [id number]           Admit Date: 10/18/2022        How to Respond to this query:       a. Click New Note     b. Answer query within the yellow box.                c. Update the Problem List, if applicable.      If you have any questions about this query contact me at: [phone]     Dr. Avery,    Patient with HTN, RA (on immunosuppressant),urinary retention with indwelling sorenson cath and recent Varicella Zoster encephalitis is admitted 10/18 for dehydration, a UTI associated with an indwelling urinary catheter and Acute Encephalopathy.  H&P \"Lactic acidosis;-resolved\"  lactic acid levels this admission:  10/18 -  3.6 and 1.7  Patient was treated with NS IV fluid bolus ( 1L).     Please clarify the following:     - Lactic acidosis-clinically significant  - Lactic acidosis-clinically insignificant  - Other (please specify)_________  - Unable to determine     By submitting this query, we are merely seeking further clarification of documentation to accurately reflect all conditions that you are monitoring, evaluating, treating or that extend the hospitalization or utilize additional resources of care. Please utilize your independent clinical judgment when addressing the question(s) above.      This query and your response, once completed, will be entered into the legal medical record.     Sincerely,  Kanu Puentes RN CDI  Clinical Documentation Integrity Program   [email or web address]    Query redirected to Dr. Avery per direction of Dr. Hayes who ask that query be sent to her partner.  [email or web address]  "

## 2022-11-01 NOTE — HOME HEALTH
Routine Visit Note:    Skill/education provided: gait training, LE strengthening exercises, activity tolerance interventions    Patient/caregiver response: tolerated session well; fatigue noted following    Plan for next visit: continue to progress as tolerated    Other pertinent info: n/a

## 2022-11-01 NOTE — PROGRESS NOTES
"Enter Query Response Below      Query Response:     Metabolic encephalopathy  Electronically signed by Gnea Calvillo MD, 22, 9:07 AM EDT.          If applicable, please update the problem list.     Patient: Vane Villavicencio        : 1945  Account: [id number]           Admit Date: 10/18/2022        How to Respond to this query:       a. Click New Note     b. Answer query within the yellow box.                c. Update the Problem List, if applicable.      If you have any questions about this query contact me at: [phone]    Dr. Avery,    Patient with HTN, RA (on immunosuppressant),urinary retention with indwelling sorenson cath and recent Varicella Zoster encephalitis who had an acute onset of confusion and \" talking out of her head\", is admitted for dehydration, a UTI associated with an indwelling urinary catheter and Acute Encephalopathy possibly related to the UTI.  Final urine culture showed \"50,000 CFU/mL Pseudomonas\" and \"50,000 CFU/mL Escherichia coli\"  Patient was disoriented  and confused on arrival but improved with treatments.  Patient was treated with Rocephin, NS IV fluid bolus  (1L), Zovirax, and Valtrex.     Please further clarify the encephalopathy as:     - Metabolic encephalopathy  - Encephalopathy, unspecified  - Other___________( please specify)  - Unable to determine      By submitting this query, we are merely seeking further clarification of documentation to accurately reflect all conditions that you are monitoring, evaluating, treating or that extend the hospitalization or utilize additional resources of care. Please utilize your independent clinical judgment when addressing the question(s) above.      This query and your response, once completed, will be entered into the legal medical record.     Sincerely,  Kanu Puentes RN CDI  Clinical Documentation Integrity Program   [email or web address]    Query redirected to Dr. Avery per request of Dr. Hayes to send to her partner. "   [email or web address]

## 2022-11-01 NOTE — CASE COMMUNICATION
Patient missed a HHAIDE visit from [facility] on 11-1-22.     Reason: Patient has cancled HHA visits.       For your records only.   As per home health protocol, MD must be notified of missed/cancelled visits; therefore the prescribed frequency was not met.

## 2022-11-02 ENCOUNTER — READMISSION MANAGEMENT (OUTPATIENT)
Dept: CALL CENTER | Facility: HOSPITAL | Age: 77
End: 2022-11-02

## 2022-11-02 NOTE — OUTREACH NOTE
Medical Week 2 Survey    Flowsheet Row Responses   [facility] patient discharged from? [place]   Does the patient have one of the following disease processes/diagnoses(primary or secondary)? Other   Week 2 attempt successful? Yes   Call start time 1609   Discharge diagnosis Acute encephalopathy   Call end time 1613   Meds reviewed with patient/caregiver? Yes   Is the patient having any side effects they believe may be caused by any medication additions or changes? No   Does the patient have all medications ordered at discharge? Yes   Is the patient taking all medications as directed (includes completed medication regime)? Yes   Does the patient have a primary care provider?  Yes   Comments regarding PCP 11/1/22   Has the patient kept scheduled appointments due by today? Yes   What is the Home health agency?  [facility]   Has home health visited the patient within 72 hours of discharge? Unsure   Psychosocial issues? No   Did the patient receive a copy of their discharge instructions? Yes   Nursing interventions Reviewed instructions with patient   What is the patient's perception of their health status since discharge? Improving   Is the patient/caregiver able to teach back signs and symptoms related to disease process for when to call PCP? Yes   Is the patient/caregiver able to teach back signs and symptoms related to disease process for when to call 911? Yes   Is the patient/caregiver able to teach back the hierarchy of who to call/visit for symptoms/problems? PCP, Specialist, Home health nurse, Urgent Care, ED, 911 Yes   If the patient is a current smoker, are they able to teach back resources for cessation? Not a smoker   Week 2 Call Completed? Yes          STEPHANE AJ - Registered Nurse

## 2022-11-03 ENCOUNTER — HOME CARE VISIT (OUTPATIENT)
Dept: HOME HEALTH SERVICES | Facility: HOME HEALTHCARE | Age: 77
End: 2022-11-03

## 2022-11-03 VITALS
OXYGEN SATURATION: 98 % | SYSTOLIC BLOOD PRESSURE: 138 MMHG | DIASTOLIC BLOOD PRESSURE: 65 MMHG | HEART RATE: 82 BPM | RESPIRATION RATE: 16 BRPM

## 2022-11-03 VITALS — HEART RATE: 93 BPM | OXYGEN SATURATION: 96 % | DIASTOLIC BLOOD PRESSURE: 82 MMHG | SYSTOLIC BLOOD PRESSURE: 144 MMHG

## 2022-11-03 PROCEDURE — G0151 HHCP-SERV OF PT,EA 15 MIN: HCPCS

## 2022-11-03 PROCEDURE — G0152 HHCP-SERV OF OT,EA 15 MIN: HCPCS

## 2022-11-03 NOTE — HOME HEALTH
Pt. tolerated OT session fair. Pt participated in 55 mins of ADLs and theraputic exercises: Pt. demonstrated increased activity tolerance for all OOB ADLs today with understanding of energy conservation techniques and importance of HEP. Continue OT per POC to increase independence and safety with ADLs within the home.

## 2022-11-07 ENCOUNTER — HOME CARE VISIT (OUTPATIENT)
Dept: HOME HEALTH SERVICES | Facility: HOME HEALTHCARE | Age: 77
End: 2022-11-07

## 2022-11-08 ENCOUNTER — HOME CARE VISIT (OUTPATIENT)
Dept: HOME HEALTH SERVICES | Facility: HOME HEALTHCARE | Age: 77
End: 2022-11-08

## 2022-11-08 VITALS
HEART RATE: 80 BPM | TEMPERATURE: 98 F | DIASTOLIC BLOOD PRESSURE: 78 MMHG | OXYGEN SATURATION: 97 % | SYSTOLIC BLOOD PRESSURE: 128 MMHG | RESPIRATION RATE: 16 BRPM

## 2022-11-08 VITALS
RESPIRATION RATE: 16 BRPM | DIASTOLIC BLOOD PRESSURE: 87 MMHG | OXYGEN SATURATION: 98 % | SYSTOLIC BLOOD PRESSURE: 147 MMHG | HEART RATE: 89 BPM

## 2022-11-08 PROCEDURE — G0151 HHCP-SERV OF PT,EA 15 MIN: HCPCS

## 2022-11-08 PROCEDURE — G0495 RN CARE TRAIN/EDU IN HH: HCPCS

## 2022-11-10 ENCOUNTER — HOME CARE VISIT (OUTPATIENT)
Dept: HOME HEALTH SERVICES | Facility: HOME HEALTHCARE | Age: 77
End: 2022-11-10

## 2022-11-10 VITALS
RESPIRATION RATE: 16 BRPM | HEART RATE: 80 BPM | SYSTOLIC BLOOD PRESSURE: 154 MMHG | DIASTOLIC BLOOD PRESSURE: 81 MMHG | OXYGEN SATURATION: 98 %

## 2022-11-10 PROCEDURE — G0151 HHCP-SERV OF PT,EA 15 MIN: HCPCS

## 2022-11-10 NOTE — HOME HEALTH
Pt continues to progress well with therapy interventions and is appropriate to continue to progress towards goals per POC.

## 2022-11-11 ENCOUNTER — READMISSION MANAGEMENT (OUTPATIENT)
Dept: CALL CENTER | Facility: HOSPITAL | Age: 77
End: 2022-11-11

## 2022-11-11 ENCOUNTER — HOME CARE VISIT (OUTPATIENT)
Dept: HOME HEALTH SERVICES | Facility: HOME HEALTHCARE | Age: 77
End: 2022-11-11

## 2022-11-11 PROCEDURE — G0152 HHCP-SERV OF OT,EA 15 MIN: HCPCS

## 2022-11-11 NOTE — OUTREACH NOTE
Medical Week 3 Survey    Flowsheet Row Responses   [facility] patient discharged from? [facility]   Does the patient have one of the following disease processes/diagnoses(primary or secondary)? Other   Week 3 attempt successful? Yes   Call start time 1026   Call end time 1030   Discharge diagnosis Acute encephalopathy   Is the patient taking all medications as directed (includes completed medication regime)? Yes   Medication comments On cipro for uti   Does the patient have a primary care provider?  Yes   Has the patient kept scheduled appointments due by today? Yes   Comments has seen a couple of times.     What is the Home health agency?  [facility]   Has home health visited the patient within 72 hours of discharge? Yes   Psychosocial issues? No   Did the patient receive a copy of their discharge instructions? Yes   Nursing interventions Reviewed instructions with patient   What is the patient's perception of their health status since discharge? Improving   Is the patient/caregiver able to teach back signs and symptoms related to disease process for when to call PCP? Yes   Is the patient/caregiver able to teach back signs and symptoms related to disease process for when to call 911? Yes   Is the patient/caregiver able to teach back the hierarchy of who to call/visit for symptoms/problems? PCP, Specialist, Home health nurse, Urgent Care, ED, 911 Yes   Week 3 Call Completed? Yes   Graduated Yes   Graduated/Revoked comments Reports is getting stronger. Is aware of when to call her Dr. Isabella Long yesterday and is ABT for start of UTI. No needs.    Wrap up additional comments Reports is getting stronger. Is aware of when to call her Dr. Isabella Long yesterday and is ABT for start of UTI. No needs.           LAITH BRINK - Registered Nurse

## 2022-11-14 ENCOUNTER — HOME CARE VISIT (OUTPATIENT)
Dept: HOME HEALTH SERVICES | Facility: HOME HEALTHCARE | Age: 77
End: 2022-11-14

## 2022-11-15 ENCOUNTER — HOME CARE VISIT (OUTPATIENT)
Dept: HOME HEALTH SERVICES | Facility: HOME HEALTHCARE | Age: 77
End: 2022-11-15

## 2022-11-15 VITALS
DIASTOLIC BLOOD PRESSURE: 68 MMHG | SYSTOLIC BLOOD PRESSURE: 119 MMHG | HEART RATE: 76 BPM | OXYGEN SATURATION: 97 % | RESPIRATION RATE: 16 BRPM

## 2022-11-15 VITALS
HEART RATE: 82 BPM | DIASTOLIC BLOOD PRESSURE: 81 MMHG | RESPIRATION RATE: 16 BRPM | OXYGEN SATURATION: 97 % | SYSTOLIC BLOOD PRESSURE: 148 MMHG

## 2022-11-15 PROCEDURE — G0151 HHCP-SERV OF PT,EA 15 MIN: HCPCS

## 2022-11-15 NOTE — HOME HEALTH
Routine Visit Note: OT ROUTINE VISIT    Skill/education provided: OT PROVIDED SKILLED INSTRUCTION ON UE HEP FOR AROM AND STRENGTH.    Patient/caregiver response: PT TOLERATED TX WELL. PROGRESSING AS EXPECTED WITH POC.    Plan for next visit: ADLS AND HEP INSTRUCTION.

## 2022-11-17 ENCOUNTER — HOME CARE VISIT (OUTPATIENT)
Dept: HOME HEALTH SERVICES | Facility: HOME HEALTHCARE | Age: 77
End: 2022-11-17

## 2022-11-17 ENCOUNTER — OFFICE VISIT (OUTPATIENT)
Dept: UROLOGY | Facility: CLINIC | Age: 77
End: 2022-11-17

## 2022-11-17 VITALS
DIASTOLIC BLOOD PRESSURE: 86 MMHG | HEART RATE: 82 BPM | TEMPERATURE: 98.1 F | WEIGHT: 133 LBS | OXYGEN SATURATION: 99 % | HEIGHT: 64 IN | SYSTOLIC BLOOD PRESSURE: 130 MMHG | BODY MASS INDEX: 22.71 KG/M2

## 2022-11-17 DIAGNOSIS — G83.4 CAUDA EQUINA COMPRESSION: Primary | ICD-10-CM

## 2022-11-17 DIAGNOSIS — R33.9 URINARY RETENTION: ICD-10-CM

## 2022-11-17 LAB
BILIRUB BLD-MCNC: NEGATIVE MG/DL
CLARITY, POC: CLEAR
COLOR UR: YELLOW
EXPIRATION DATE: NORMAL
GLUCOSE UR STRIP-MCNC: NEGATIVE MG/DL
KETONES UR QL: NEGATIVE
LEUKOCYTE EST, POC: NEGATIVE
Lab: NORMAL
NITRITE UR-MCNC: NEGATIVE MG/ML
PH UR: 8 [PH] (ref 5–8)
PROT UR STRIP-MCNC: NEGATIVE MG/DL
RBC # UR STRIP: NEGATIVE /UL
SP GR UR: 1.01 (ref 1–1.03)
UROBILINOGEN UR QL: NORMAL

## 2022-11-17 PROCEDURE — 51798 US URINE CAPACITY MEASURE: CPT | Performed by: UROLOGY

## 2022-11-17 PROCEDURE — 99213 OFFICE O/P EST LOW 20 MIN: CPT | Performed by: UROLOGY

## 2022-11-17 PROCEDURE — 81003 URINALYSIS AUTO W/O SCOPE: CPT | Performed by: UROLOGY

## 2022-11-17 RX ORDER — METOPROLOL SUCCINATE 50 MG/1
50 TABLET, EXTENDED RELEASE ORAL 2 TIMES DAILY
COMMUNITY
Start: 2022-08-15

## 2022-11-17 RX ORDER — PANTOPRAZOLE SODIUM 40 MG/1
40 TABLET, DELAYED RELEASE ORAL DAILY
COMMUNITY
Start: 2022-11-10

## 2022-11-17 RX ORDER — POTASSIUM CITRATE 10 MEQ/1
10 TABLET, EXTENDED RELEASE ORAL 2 TIMES DAILY
COMMUNITY
Start: 2022-08-17

## 2022-11-17 NOTE — PROGRESS NOTES
Chief Complaint   Patient presents with   • Follow-up     Urodynamics        HPI  Ms. Villavicencio is a 77 y.o. female with history below in assessment, who presents for follow up.     At this visit patient is not doing any cathing. She is too nervous.     Past Medical History:   Diagnosis Date   • Asthma    • Benign hypertension    • Disease of thyroid gland    • Family history of heart disease    • Family history of heart disease    • Hypercholesterolemia    • Rheumatoid arthritis (HCC)    • Rheumatoid arthritis (HCC)    • Supraventricular tachycardia (HCC)     first diagnosed 06/01/2012   • Supraventricular tachycardia (HCC)        Past Surgical History:   Procedure Laterality Date   • CARDIAC CATHETERIZATION N/A 7/10/2020    Procedure: Left Heart Cath;  Surgeon: Pankaj Pollard MD;  Location: [facility] CATH INVASIVE LOCATION;  Service: Cardiology;  Laterality: N/A;   • SINUS SURGERY           Current Outpatient Medications:   •  acetaminophen (TYLENOL) 325 MG tablet, Take 2 tablets by mouth Every 6 (Six) Hours As Needed for Mild Pain., Disp: , Rfl:   •  acetaminophen-codeine (TYLENOL with CODEINE #3) 300-30 MG per tablet, Take 1 tablet by mouth Every 4 (Four) Hours As Needed for Moderate Pain (take at bedtime). As needed, Disp: , Rfl:   •  aspirin 81 MG chewable tablet, Chew 81 mg Daily., Disp: , Rfl:   •  atorvastatin (LIPITOR) 80 MG tablet, Take 1 tablet by mouth Every Night., Disp: 90 tablet, Rfl:   •  fluticasone (FLONASE) 50 MCG/ACT nasal spray, 2 sprays into each nostril as needed. Administer 2 sprays in each nostril for each dose. , Disp: , Rfl:   •  LORazepam (ATIVAN) 0.5 MG tablet, Take 0.5 mg by mouth every 8 (eight) hours as needed for anxiety., Disp: , Rfl:   •  metoprolol succinate XL (TOPROL-XL) 50 MG 24 hr tablet, Take 50 mg by mouth 2 (Two) Times a Day., Disp: , Rfl:   •  Metoprolol Tartrate 75 MG tablet, Take 75 tablets by mouth Every 12 (Twelve) Hours., Disp: , Rfl:   •  pantoprazole (PROTONIX) 40  "MG EC tablet, Take 40 mg by mouth Daily., Disp: , Rfl:   •  potassium citrate (UROCIT-K) 10 MEQ (1080 MG) CR tablet, Take 10 mEq by mouth 2 (Two) Times a Day., Disp: , Rfl:   •  predniSONE (DELTASONE) 5 MG tablet, Take 1 tablet by mouth Daily., Disp: , Rfl:   •  valACYclovir (Valtrex) 500 MG tablet, Take two tablets by mouth twice daily for 1 week then decrease to one tablet by mouth twice daily. (Patient taking differently: Take one tablet by mouth twice daily for 1 week then decrease to one tablet by mouth twice daily.), Disp: 70 tablet, Rfl: 1  •  amLODIPine (NORVASC) 5 MG tablet, Take 1 tablet by mouth Daily. (Patient not taking: Reported on 10/26/2022), Disp: , Rfl:   •  aspirin 325 MG tablet, Take 1 tablet by mouth Daily. (Patient not taking: Reported on 10/26/2022), Disp: , Rfl:   •  bethanechol (URECHOLINE) 25 MG tablet, Take 25 mg by mouth 3 (Three) Times a Day., Disp: , Rfl:   •  cholecalciferol (VITAMIN D3) 25 MCG (1000 UT) tablet, Take 2,000 Units by mouth Daily., Disp: , Rfl:   •  leflunomide (ARAVA) 20 MG tablet, Take 1 tablet by mouth Daily. (Patient not taking: Reported on 10/26/2022), Disp: , Rfl:   •  metoprolol tartrate (LOPRESSOR) 50 MG tablet, Take 1 tablet by mouth Every 12 (Twelve) Hours. (Patient not taking: Reported on 10/26/2022), Disp: , Rfl:      Physical Exam  Visit Vitals  /86 (BP Location: Right arm, Patient Position: Sitting, Cuff Size: Adult)   Pulse 82   Temp 98.1 °F (36.7 °C) (Temporal)   Ht 162.6 cm (64.02\")   Wt 60.3 kg (133 lb)   SpO2 99%   BMI 22.82 kg/m²       Labs  Brief Urine Lab Results  (Last result in the past 365 days)      Color   Clarity   Blood   Leuk Est   Nitrite   Protein   CREAT   Urine HCG        11/17/22 1443 Yellow   Clear   Negative   Negative   Negative   Negative                 Lab Results   Component Value Date    GLUCOSE 106 (H) 10/26/2022    CALCIUM 8.6 10/26/2022     (L) 10/26/2022    K 3.3 (L) 10/26/2022    CO2 25.3 10/26/2022    CL 93 (L) " 10/26/2022    BUN 10 10/26/2022    CREATININE 0.70 10/26/2022    EGFRIFNONA 43 (L) 08/19/2021    BCR 14.3 10/26/2022    ANIONGAP 11.7 10/26/2022       Lab Results   Component Value Date    WBC 6.58 10/26/2022    HGB 8.4 (L) 10/26/2022    HCT 25.7 (L) 10/26/2022    MCV 94.1 10/26/2022     10/26/2022       Urine Culture    Urine Culture 8/7/22 10/18/22 10/18/22 10/27/22     0946 0946    Urine Culture >100,000 CFU/mL Escherichia coli (A) 50,000 CFU/mL Pseudomonas aeruginosa (A) (C) 50,000 CFU/mL Escherichia coli (A) (C) No growth   (A) Abnormal value (C) Corrected value               Radiographic Studies  Adult Transthoracic Echo Complete W/ Cont if Necessary Per Protocol (With Agitated Saline)    Addendum Date: 9/10/2022    · Left ventricular systolic function is hyperdynamic (EF > 70%). · Left ventricular diastolic function is consistent with (grade I) impaired relaxation. · The cardiac valves are anatomically and functionally normal. · Nondiagnostic bubble study      CT Angiogram Neck    Result Date: 9/9/2022   1. Minimal atherosclerotic plaque without evidence of stenosis, occlusion, aneurysm or dissection in the neck or the head. 2. Cervical degenerative changes. 3. 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  This report was finalized on 9/9/2022 6:57 PM by Omar Hughes MD.      MRI Brain Without Contrast    Result Date: 9/10/2022  1. No acute infarct. 2. Mild changes chronic small vessel ischemic disease. Volume loss. 3. Severe right-sided paranasal sinus disease. Mastoid effusions.  Electronically signed by:  Huey Briones M.D.  9/10/2022 1:47 AM Mountain Time    MRI Lumbar Spine Without Contrast    Result Date: 9/10/2022  1.  Severe degenerative changes of the lumbar spine. Moderate scoliosis. 2.  At L2-L3, there is severe spinal canal stenosis with crowding/compression of the cauda equina nerve roots due to a circumferential disc extrusion and facet arthropathy.  There is also severe right foraminal narrowing. 3.  At L4-L5, there is severe spinal canal stenosis due to grade 2 anterolisthesis and facet arthropathy. There is also severe left foraminal narrowing. 4.  Additional milder degenerative changes detailed above. Electronically signed by:  Jose Alfredo Finch DO  9/9/2022 11:09 PM Mountain Time    XR Chest 1 View    Result Date: 10/18/2022  1.  An acute pulmonary process is not apparent. 2.  Evidence for prior granulomatous exposure. 3.  Scoliosis.  This report was finalized on 10/18/2022 10:28 AM by Steven Chris MD.      XR Chest 1 View    Result Date: 9/9/2022   1. No acute cardiopulmonary abnormality. 2. Suspected hiatal hernia.  This report was finalized on 9/9/2022 7:34 PM by Omar Hughes MD.      XR Chest 1 View    Result Date: 8/7/2022  No acute cardiopulmonary process.  Continued followup is recommended.  This report was signed and finalized on 8/7/2022 11:14 AM by Pio Leung DO.    IR LUMBAR PUNCTURE DIAGNOSTIC    Result Date: 10/18/2022  Successful fluoroscopic guided lumbar puncture as above with no immediate complications.    This report was finalized on 10/18/2022 3:39 PM by Ceasar Nicole.      IR LUMBAR PUNCTURE DIAGNOSTIC    Result Date: 9/12/2022  Successful fluoroscopic guided lumbar puncture as above with no immediate complications.    This report was finalized on 9/12/2022 4:20 PM by Ceasar Nicole.      DEXA Bone Density Axial    Result Date: 8/18/2022  The bone mineral densities within the lumbar spine and total mean femurs are within the range of normal.      Images were reviewed, interpreted, and dictated by Dr. Vadim Tucker MD Transcribed by Florence Macdonald PA-C.  This report was signed and finalized on 8/18/2022 1:59 PM by Vadim Tucker MD.    CT Head Without Contrast Stroke Protocol    Result Date: 9/9/2022   1. No acute intracranial abnormality. 2. Mild chronic small vessel ischemic change. 3. Severe obstructive right-sided paranasal sinus  mucosal disease.  This report was finalized on 9/9/2022 6:14 PM by Omar Hughes MD.      CT Angiogram Head w AI Analysis of LVO    Result Date: 9/9/2022   1. Minimal atherosclerotic plaque without evidence of stenosis, occlusion, aneurysm or dissection in the neck or the head. 2. Cervical degenerative changes. 3. 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  This report was finalized on 9/9/2022 6:57 PM by Omar Hughes MD.      CT CEREBRAL PERFUSION WITH & WITHOUT CONTRAST    Result Date: 9/9/2022  No perfusion abnormalities in the brain. No evidence of brain risk or cortical infarct.  This report was finalized on 9/9/2022 6:46 PM by Omar Hughes MD.      Mammo Screening Digital Tomosynthesis Bilateral With CAD    Result Date: 8/18/2022  No mammographic evidence of malignancy   BI-RADS CATEGORY: 1 , NEGATIVE.    RECOMMENDED FOLLOW-UP:  Annual screening mammography.    NOTES: Mammography does not detect approximately 10-15% of breast cancers. Physical examination of the breasts by a physician and regular monthly breast self examinations are integral parts of breast cancer screening.   A normal mammogram does not exclude breast cancer if there is an abnormal finding on physical examination. When clinically indicated, a biopsy should not be postponed because of a normal mammogram report.   Please allow this report to serve as a order for additional imaging follow-up  [place]. [id number]  NOTE: If a biopsy is performed on this patient, a copy of the pathology report would be appreciated.  This report was signed and finalized on 8/18/2022 11:48 PM by Vadim Tucker MD.      I have reviewed the above labs and imaging.     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  Her PVR today reveals mild retention of 124cc.  She is currently completing a course of cefdinir for suspected UTI during her  recent ER visit on October 18 for return of AMS.  She is currently feeling better and happy to be at home.  Urine culture from October 18 grew 50,000 CFU of Pseudomonas and E. coli, fortunately both sensitive to cephalosporins.    Urodynamics was concerning for bladder outlet obstruction, with maximum flow only 7.6 mL/s, and elevated pressures at peak flow, with PVR of 200 mL.  This is consistent with outlet obstruction, with the most likely etiology being pelvic organ prolapse.    Plan  1. Schedule FU with Jim for pelvic exam and pessary fitting they

## 2022-11-18 ENCOUNTER — PROCEDURE VISIT (OUTPATIENT)
Dept: UROLOGY | Facility: CLINIC | Age: 77
End: 2022-11-18

## 2022-11-18 ENCOUNTER — HOME CARE VISIT (OUTPATIENT)
Dept: HOME HEALTH SERVICES | Facility: HOME HEALTHCARE | Age: 77
End: 2022-11-18

## 2022-11-18 DIAGNOSIS — N81.4 CYSTOCELE WITH PROLAPSE: Primary | ICD-10-CM

## 2022-11-18 PROCEDURE — 99213 OFFICE O/P EST LOW 20 MIN: CPT | Performed by: NURSE PRACTITIONER

## 2022-11-18 NOTE — PROGRESS NOTES
Office Visit General Established Female Patient     Patient Name: Vane Villavicencio  : 1945   MRN: [id number]     Chief Complaint: No chief complaint on file.      Referring Provider: No ref. provider found    History of Present Illness: Vane Villavicencio is a 77 y.o. female with history below in assessment, who presents for Pessary sizing. She is planning on going to [place] for the winter and would like the pessary placed prior to her leaving.  She has a cystocele does not empty her bladder completely and this is causing her to get recurrent urinary tract infection      Subjective      Review of System:   Constitutional: No fevers or chills  Genitourinary: recurrent UTI    Past Medical History:   Past Medical History:   Diagnosis Date   • Asthma    • Benign hypertension    • Disease of thyroid gland    • Family history of heart disease    • Family history of heart disease    • Hypercholesterolemia    • Rheumatoid arthritis (HCC)    • Rheumatoid arthritis (HCC)    • Supraventricular tachycardia (HCC)     first diagnosed 2012   • Supraventricular tachycardia (HCC)        Past Surgical History:   Past Surgical History:   Procedure Laterality Date   • CARDIAC CATHETERIZATION N/A 7/10/2020    Procedure: Left Heart Cath;  Surgeon: Pankaj Pollard MD;  Location: [facility] CATH INVASIVE LOCATION;  Service: Cardiology;  Laterality: N/A;   • SINUS SURGERY         Family History:   Family History   Problem Relation Age of Onset   • Heart disease Other    • Hypertension Sister        Social History:   Social History     Socioeconomic History   • Marital status:    Tobacco Use   • Smoking status: Former     Packs/day: 2.00     Types: Cigarettes     Quit date: 1970     Years since quittin.9   • Smokeless tobacco: Never   Vaping Use   • Vaping Use: Never used   Substance and Sexual Activity   • Alcohol use: No   • Drug use: No   • Sexual activity: Defer       Medications:     Current Outpatient  Medications:   •  acetaminophen (TYLENOL) 325 MG tablet, Take 2 tablets by mouth Every 6 (Six) Hours As Needed for Mild Pain., Disp: , Rfl:   •  acetaminophen-codeine (TYLENOL with CODEINE #3) 300-30 MG per tablet, Take 1 tablet by mouth Every 4 (Four) Hours As Needed for Moderate Pain (take at bedtime). As needed, Disp: , Rfl:   •  amLODIPine (NORVASC) 5 MG tablet, Take 1 tablet by mouth Daily. (Patient not taking: Reported on 10/26/2022), Disp: , Rfl:   •  aspirin 325 MG tablet, Take 1 tablet by mouth Daily. (Patient not taking: Reported on 10/26/2022), Disp: , Rfl:   •  aspirin 81 MG chewable tablet, Chew 81 mg Daily., Disp: , Rfl:   •  atorvastatin (LIPITOR) 80 MG tablet, Take 1 tablet by mouth Every Night., Disp: 90 tablet, Rfl:   •  bethanechol (URECHOLINE) 25 MG tablet, Take 25 mg by mouth 3 (Three) Times a Day., Disp: , Rfl:   •  cholecalciferol (VITAMIN D3) 25 MCG (1000 UT) tablet, Take 2,000 Units by mouth Daily., Disp: , Rfl:   •  fluticasone (FLONASE) 50 MCG/ACT nasal spray, 2 sprays into each nostril as needed. Administer 2 sprays in each nostril for each dose. , Disp: , Rfl:   •  leflunomide (ARAVA) 20 MG tablet, Take 1 tablet by mouth Daily. (Patient not taking: Reported on 10/26/2022), Disp: , Rfl:   •  LORazepam (ATIVAN) 0.5 MG tablet, Take 0.5 mg by mouth every 8 (eight) hours as needed for anxiety., Disp: , Rfl:   •  metoprolol succinate XL (TOPROL-XL) 50 MG 24 hr tablet, Take 50 mg by mouth 2 (Two) Times a Day., Disp: , Rfl:   •  metoprolol tartrate (LOPRESSOR) 50 MG tablet, Take 1 tablet by mouth Every 12 (Twelve) Hours. (Patient not taking: Reported on 10/26/2022), Disp: , Rfl:   •  Metoprolol Tartrate 75 MG tablet, Take 75 tablets by mouth Every 12 (Twelve) Hours., Disp: , Rfl:   •  pantoprazole (PROTONIX) 40 MG EC tablet, Take 40 mg by mouth Daily., Disp: , Rfl:   •  potassium citrate (UROCIT-K) 10 MEQ (1080 MG) CR tablet, Take 10 mEq by mouth 2 (Two) Times a Day., Disp: , Rfl:   •   predniSONE (DELTASONE) 5 MG tablet, Take 1 tablet by mouth Daily., Disp: , Rfl:   •  valACYclovir (Valtrex) 500 MG tablet, Take two tablets by mouth twice daily for 1 week then decrease to one tablet by mouth twice daily. (Patient taking differently: Take one tablet by mouth twice daily for 1 week then decrease to one tablet by mouth twice daily.), Disp: 70 tablet, Rfl: 1    Allergies:   No Known Allergies    Objective     Physical Exam:   Vital Signs:   There were no vitals taken for this visit.   There is no height or weight on file to calculate BMI.     Constitutional: NAD, WDWN.   Neurological: A + O x 3   Psychiatric:  Normal mood and affect  : Small introitus, grade 2 cystocele bulges to introitus with Valsalva, no notable rectocele or vaginal vault prolapse    Labs  Brief Urine Lab Results  (Last result in the past 365 days)      Color   Clarity   Blood   Leuk Est   Nitrite   Protein   CREAT   Urine HCG        11/17/22 1443 Yellow   Clear   Negative   Negative   Negative   Negative                 Lab Results   Component Value Date    GLUCOSE 106 (H) 10/26/2022    CALCIUM 8.6 10/26/2022     (L) 10/26/2022    K 3.3 (L) 10/26/2022    CO2 25.3 10/26/2022    CL 93 (L) 10/26/2022    BUN 10 10/26/2022    CREATININE 0.70 10/26/2022    EGFRIFNONA 43 (L) 08/19/2021    BCR 14.3 10/26/2022    ANIONGAP 11.7 10/26/2022       Lab Results   Component Value Date    WBC 6.58 10/26/2022    HGB 8.4 (L) 10/26/2022    HCT 25.7 (L) 10/26/2022    MCV 94.1 10/26/2022     10/26/2022       Urine Culture    Urine Culture 8/7/22 10/18/22 10/18/22 10/27/22     0946 0946    Urine Culture >100,000 CFU/mL Escherichia coli (A) 50,000 CFU/mL Pseudomonas aeruginosa (A) (C) 50,000 CFU/mL Escherichia coli (A) (C) No growth   (A) Abnormal value (C) Corrected value                       Assessment / Plan      Assessment/Plan:   Diagnoses and all orders for this visit:    1. Cystocele with prolapse (Primary)    77-year-old with RA  here for pessary fitting we discussed with the vaginal exam I recommend ring with support 2 inch to 2-1/4 inch.  I do not have sizers I offered to order pessary for patient and have her return when they come in so we can properly fit her she is in agreement.  Pessaries were ordered we will get patient scheduled next week when they are delivered to our office.    1. Return for pessary placement    Follow Up:   No follow-ups on file.    MIHAI Montaño  [facility]

## 2022-11-22 ENCOUNTER — HOME CARE VISIT (OUTPATIENT)
Dept: HOME HEALTH SERVICES | Facility: HOME HEALTHCARE | Age: 77
End: 2022-11-22

## 2022-11-22 VITALS
HEART RATE: 73 BPM | SYSTOLIC BLOOD PRESSURE: 167 MMHG | RESPIRATION RATE: 16 BRPM | DIASTOLIC BLOOD PRESSURE: 80 MMHG | OXYGEN SATURATION: 99 %

## 2022-11-22 PROCEDURE — G0151 HHCP-SERV OF PT,EA 15 MIN: HCPCS

## 2022-11-23 ENCOUNTER — HOME CARE VISIT (OUTPATIENT)
Dept: HOME HEALTH SERVICES | Facility: HOME HEALTHCARE | Age: 77
End: 2022-11-23

## 2022-11-23 ENCOUNTER — OFFICE VISIT (OUTPATIENT)
Dept: UROLOGY | Facility: CLINIC | Age: 77
End: 2022-11-23

## 2022-11-23 DIAGNOSIS — N95.8 GENITOURINARY SYNDROME OF MENOPAUSE: Primary | ICD-10-CM

## 2022-11-23 DIAGNOSIS — N81.4 CYSTOCELE WITH PROLAPSE: ICD-10-CM

## 2022-11-23 PROCEDURE — 99213 OFFICE O/P EST LOW 20 MIN: CPT | Performed by: NURSE PRACTITIONER

## 2022-11-23 PROCEDURE — 57160 INSERT PESSARY/OTHER DEVICE: CPT | Performed by: NURSE PRACTITIONER

## 2022-11-23 PROCEDURE — A4562 PESSARY, NON RUBBER,ANY TYPE: HCPCS | Performed by: NURSE PRACTITIONER

## 2022-11-23 RX ORDER — ESTRADIOL 0.1 MG/G
CREAM VAGINAL
Qty: 42.5 G | Refills: 3 | Status: SHIPPED | OUTPATIENT
Start: 2022-11-23

## 2022-11-23 NOTE — HOME HEALTH
Pt is progressing well with therapy interventions. Plan is to d/c pt next week due to cert period ending; recommend pt resume therapy with OP PT once she arrives in [place]; pt agrees.

## 2022-11-23 NOTE — PROGRESS NOTES
Office Visit General Established Female Patient     Patient Name: Vane Villavicencio  : 1945   MRN: [id number]     Chief Complaint: No chief complaint on file.      Referring Provider: No ref. provider found    History of Present Illness: Vane Villavicencio is a 77 y.o. female with history below in assessment, who presents for Pessary insertion.  Patient has cystocele and develops recurrent UTIs where she cannot empty her bladder completely she wishes for pessary to try and improve the symptoms.  Patient plans on spending the next 3 months in [place] would like her pessary placed prior if she cannot remove pessary she will check with urology in [place] to see if they will take it out and clean for her and we will plan on following up with me in 3 months for pessary maintenance.      Subjective      Review of System:   Constitutional: No fevers or chills  Genitourinary: Negative for current UTI symptoms    Past Medical History:   Past Medical History:   Diagnosis Date   • Asthma    • Benign hypertension    • Disease of thyroid gland    • Family history of heart disease    • Family history of heart disease    • Hypercholesterolemia    • Rheumatoid arthritis (HCC)    • Rheumatoid arthritis (HCC)    • Supraventricular tachycardia (HCC)     first diagnosed 2012   • Supraventricular tachycardia (HCC)        Past Surgical History:   Past Surgical History:   Procedure Laterality Date   • CARDIAC CATHETERIZATION N/A 7/10/2020    Procedure: Left Heart Cath;  Surgeon: Pankaj Pollard MD;  Location: [facility] INVASIVE LOCATION;  Service: Cardiology;  Laterality: N/A;   • SINUS SURGERY         Family History:   Family History   Problem Relation Age of Onset   • Heart disease Other    • Hypertension Sister        Social History:   Social History     Socioeconomic History   • Marital status:    Tobacco Use   • Smoking status: Former     Packs/day: 2.00     Types: Cigarettes     Quit date: 1970     Years  since quittin.9   • Smokeless tobacco: Never   Vaping Use   • Vaping Use: Never used   Substance and Sexual Activity   • Alcohol use: No   • Drug use: No   • Sexual activity: Defer       Medications:     Current Outpatient Medications:   •  acetaminophen (TYLENOL) 325 MG tablet, Take 2 tablets by mouth Every 6 (Six) Hours As Needed for Mild Pain., Disp: , Rfl:   •  acetaminophen-codeine (TYLENOL with CODEINE #3) 300-30 MG per tablet, Take 1 tablet by mouth Every 4 (Four) Hours As Needed for Moderate Pain (take at bedtime). As needed, Disp: , Rfl:   •  amLODIPine (NORVASC) 5 MG tablet, Take 1 tablet by mouth Daily. (Patient not taking: Reported on 10/26/2022), Disp: , Rfl:   •  aspirin 325 MG tablet, Take 1 tablet by mouth Daily. (Patient not taking: Reported on 10/26/2022), Disp: , Rfl:   •  aspirin 81 MG chewable tablet, Chew 81 mg Daily., Disp: , Rfl:   •  atorvastatin (LIPITOR) 80 MG tablet, Take 1 tablet by mouth Every Night., Disp: 90 tablet, Rfl:   •  bethanechol (URECHOLINE) 25 MG tablet, Take 25 mg by mouth 3 (Three) Times a Day., Disp: , Rfl:   •  cholecalciferol (VITAMIN D3) 25 MCG (1000 UT) tablet, Take 2,000 Units by mouth Daily., Disp: , Rfl:   •  estradiol (ESTRACE) 0.1 MG/GM vaginal cream, Use externally as directed 3 nights weekly. Use 0.5gm, Disp: 42.5 g, Rfl: 3  •  fluticasone (FLONASE) 50 MCG/ACT nasal spray, 2 sprays into each nostril as needed. Administer 2 sprays in each nostril for each dose. , Disp: , Rfl:   •  leflunomide (ARAVA) 20 MG tablet, Take 1 tablet by mouth Daily. (Patient not taking: Reported on 10/26/2022), Disp: , Rfl:   •  LORazepam (ATIVAN) 0.5 MG tablet, Take 0.5 mg by mouth every 8 (eight) hours as needed for anxiety., Disp: , Rfl:   •  metoprolol succinate XL (TOPROL-XL) 50 MG 24 hr tablet, Take 50 mg by mouth 2 (Two) Times a Day., Disp: , Rfl:   •  metoprolol tartrate (LOPRESSOR) 50 MG tablet, Take 1 tablet by mouth Every 12 (Twelve) Hours. (Patient not taking:  Reported on 10/26/2022), Disp: , Rfl:   •  Metoprolol Tartrate 75 MG tablet, Take 75 tablets by mouth Every 12 (Twelve) Hours., Disp: , Rfl:   •  pantoprazole (PROTONIX) 40 MG EC tablet, Take 40 mg by mouth Daily., Disp: , Rfl:   •  potassium citrate (UROCIT-K) 10 MEQ (1080 MG) CR tablet, Take 10 mEq by mouth 2 (Two) Times a Day., Disp: , Rfl:   •  predniSONE (DELTASONE) 5 MG tablet, Take 1 tablet by mouth Daily., Disp: , Rfl:   •  valACYclovir (Valtrex) 500 MG tablet, Take two tablets by mouth twice daily for 1 week then decrease to one tablet by mouth twice daily. (Patient taking differently: Take one tablet by mouth twice daily for 1 week then decrease to one tablet by mouth twice daily.), Disp: 70 tablet, Rfl: 1    Allergies:   No Known Allergies    Objective     Physical Exam:   Vital Signs:   There were no vitals taken for this visit.   There is no height or weight on file to calculate BMI.     Constitutional: NAD, WDWN.   Neurological: A + O x 3   Psychiatric:  Normal mood and affect  Vagina: Grade 2 cystocele with Valsalva    Labs  Brief Urine Lab Results  (Last result in the past 365 days)      Color   Clarity   Blood   Leuk Est   Nitrite   Protein   CREAT   Urine HCG        11/17/22 1443 Yellow   Clear   Negative   Negative   Negative   Negative                 Lab Results   Component Value Date    GLUCOSE 106 (H) 10/26/2022    CALCIUM 8.6 10/26/2022     (L) 10/26/2022    K 3.3 (L) 10/26/2022    CO2 25.3 10/26/2022    CL 93 (L) 10/26/2022    BUN 10 10/26/2022    CREATININE 0.70 10/26/2022    EGFRIFNONA 43 (L) 08/19/2021    BCR 14.3 10/26/2022    ANIONGAP 11.7 10/26/2022       Lab Results   Component Value Date    WBC 6.58 10/26/2022    HGB 8.4 (L) 10/26/2022    HCT 25.7 (L) 10/26/2022    MCV 94.1 10/26/2022     10/26/2022       Urine Culture    Urine Culture 8/7/22 10/18/22 10/18/22 10/27/22     0946 0946    Urine Culture >100,000 CFU/mL Escherichia coli (A) 50,000 CFU/mL Pseudomonas  aeruginosa (A) (C) 50,000 CFU/mL Escherichia coli (A) (C) No growth   (A) Abnormal value (C) Corrected value             Assessment / Plan      Assessment/Plan:   Diagnoses and all orders for this visit:    1. Genitourinary syndrome of menopause (Primary)  -     estradiol (ESTRACE) 0.1 MG/GM vaginal cream; Use externally as directed 3 nights weekly. Use 0.5gm  Dispense: 42.5 g; Refill: 3    77-year-old with RA here for pessary insertion ring with support 2-1/2 inch inserted, patient was able to void with pessary in.  We discussed if pessary falls out during urination she can clean it and replace or return to the office for me to replace it.  We will plan on doing 3-month follow-up for pessary cleaning or return sooner if any complications.  We discussed vaginal estradiol cream patient will start 3 nights weekly    1. Pessary ring with support size number three 2-1/2 inch placed    Follow Up:   Return in about 3 months (around 2/23/2023) for Follow up MIHAI Aguiar, NP-C  [facility]

## 2022-11-25 ENCOUNTER — HOME CARE VISIT (OUTPATIENT)
Dept: HOME HEALTH SERVICES | Facility: HOME HEALTHCARE | Age: 77
End: 2022-11-25

## 2022-11-25 PROCEDURE — G0152 HHCP-SERV OF OT,EA 15 MIN: HCPCS

## 2022-11-28 ENCOUNTER — HOME CARE VISIT (OUTPATIENT)
Dept: HOME HEALTH SERVICES | Facility: HOME HEALTHCARE | Age: 77
End: 2022-11-28

## 2022-11-28 ENCOUNTER — TELEPHONE (OUTPATIENT)
Dept: UROLOGY | Facility: CLINIC | Age: 77
End: 2022-11-28

## 2022-11-28 VITALS
DIASTOLIC BLOOD PRESSURE: 88 MMHG | OXYGEN SATURATION: 100 % | RESPIRATION RATE: 16 BRPM | HEART RATE: 91 BPM | SYSTOLIC BLOOD PRESSURE: 161 MMHG

## 2022-11-28 VITALS
DIASTOLIC BLOOD PRESSURE: 69 MMHG | OXYGEN SATURATION: 96 % | HEART RATE: 74 BPM | RESPIRATION RATE: 15 BRPM | SYSTOLIC BLOOD PRESSURE: 124 MMHG

## 2022-11-28 PROCEDURE — G0151 HHCP-SERV OF PT,EA 15 MIN: HCPCS

## 2022-11-28 NOTE — TELEPHONE ENCOUNTER
Caller: . SPOKE WITH DOMINIQUE    Relationship: PCP    Best call back number: [phone]    What form or medical record are you requesting: LAST OFFICE VISIT NOTES    Who is requesting this form or medical record from you: [facility] OFFICE    How would you like to receive the form or medical records (pick-up, mail, fax):   If fax, what is the fax number: [phone]  If mail, what is the address:   If pick-up, provide patient with address and location details    Timeframe paperwork needed: BEFORE 12/1/22

## 2022-11-28 NOTE — HOME HEALTH
Routine Visit Note: OT DISCHARGE VISIT    Skill/education provided: OT PROVIDED SKILLED INSTUCTION ON HEP AND REVIEWED HOME SAFETY, BED/BATHROOM TRANSFERS, ADLS/IADLS.    Patient/caregiver response: PT IND CURRENT HOME PROGRAM. GOALS MET AND PT IN AGREEMENT WITH OT DC.

## 2022-11-29 NOTE — CASE COMMUNICATION
Patient missed a HHAIDE visit from [facility] on 11-7-22.     Reason: Patient refused HHA visit.       For your records only.   As per home health protocol, MD must be notified of missed/cancelled visits; therefore the prescribed frequency was not met.

## 2022-11-30 NOTE — CASE COMMUNICATION
Patient missed a HHA visit from [facility] on 11-14-22.     Reason: Patient refused HHA visit.       For your records only.   As per home health protocol, MD must be notified of missed/cancelled visits; therefore the prescribed frequency was not met.

## 2023-04-20 ENCOUNTER — OFFICE VISIT (OUTPATIENT)
Dept: UROLOGY | Facility: CLINIC | Age: 78
End: 2023-04-20
Payer: MEDICARE

## 2023-04-20 VITALS
HEIGHT: 64 IN | WEIGHT: 133 LBS | DIASTOLIC BLOOD PRESSURE: 88 MMHG | BODY MASS INDEX: 22.71 KG/M2 | SYSTOLIC BLOOD PRESSURE: 130 MMHG

## 2023-04-20 DIAGNOSIS — N81.4 CYSTOCELE WITH PROLAPSE: Primary | ICD-10-CM

## 2023-04-20 RX ORDER — ASPIRIN 81 MG/1
81 TABLET ORAL DAILY
COMMUNITY

## 2023-04-20 RX ORDER — FERROUS SULFATE 325(65) MG
325 TABLET ORAL
COMMUNITY

## 2023-04-20 RX ORDER — HYDROCHLOROTHIAZIDE 25 MG/1
1 TABLET ORAL DAILY
COMMUNITY
Start: 2023-01-18

## 2023-04-20 RX ORDER — HYDROXYCHLOROQUINE SULFATE 200 MG/1
TABLET, FILM COATED ORAL
COMMUNITY
Start: 2023-04-17

## 2023-04-20 RX ORDER — LISINOPRIL 20 MG/1
1 TABLET ORAL DAILY
COMMUNITY
Start: 2023-04-04

## 2023-04-20 NOTE — PROGRESS NOTES
Pessary Cleaning     Patient Name: Vane Villavicencio  : 1945   MRN: [id number]     Chief Complaint:   Chief Complaint   Patient presents with   • Follow-up     Pessary cleaning        History of Present Illness: Vane Villavicencio is a 77 y.o. female with history below in assessment, who presents for pessary cleaning.  She reports no problems with the pessary, she has not taken it out and cleaned it herself.      Subjective      Review of System:   As noted in HPI     Past Medical History:   Past Medical History:   Diagnosis Date   • Asthma    • Benign hypertension    • Disease of thyroid gland    • Family history of heart disease    • Family history of heart disease    • Hypercholesterolemia    • Rheumatoid arthritis    • Rheumatoid arthritis    • Supraventricular tachycardia     first diagnosed 2012   • Supraventricular tachycardia        Past Surgical History:   Past Surgical History:   Procedure Laterality Date   • CARDIAC CATHETERIZATION N/A 7/10/2020    Procedure: Left Heart Cath;  Surgeon: Pankaj Pollard MD;  Location: [facility] CATH INVASIVE LOCATION;  Service: Cardiology;  Laterality: N/A;   • SINUS SURGERY         Family History:   Family History   Problem Relation Age of Onset   • Heart disease Other    • Hypertension Sister        Social History:   Social History     Socioeconomic History   • Marital status:    Tobacco Use   • Smoking status: Former     Packs/day: 2.00     Types: Cigarettes     Quit date: 1970     Years since quittin.3   • Smokeless tobacco: Never   Vaping Use   • Vaping Use: Never used   Substance and Sexual Activity   • Alcohol use: No   • Drug use: No   • Sexual activity: Defer       Medications:     Current Outpatient Medications:   •  acetaminophen (TYLENOL) 325 MG tablet, Take 2 tablets by mouth Every 6 (Six) Hours As Needed for Mild Pain., Disp: , Rfl:   •  acetaminophen-codeine (TYLENOL with CODEINE #3) 300-30 MG per tablet, Take 1 tablet by mouth  Every 4 (Four) Hours As Needed for Moderate Pain (take at bedtime). As needed, Disp: , Rfl:   •  amLODIPine (NORVASC) 5 MG tablet, Take 1 tablet by mouth Daily. (Patient not taking: Reported on 10/26/2022), Disp: , Rfl:   •  aspirin 325 MG tablet, Take 1 tablet by mouth Daily. (Patient not taking: Reported on 10/26/2022), Disp: , Rfl:   •  aspirin 81 MG chewable tablet, Chew 81 mg Daily., Disp: , Rfl:   •  aspirin 81 MG EC tablet, Take 1 tablet by mouth Daily., Disp: , Rfl:   •  atorvastatin (LIPITOR) 80 MG tablet, Take 1 tablet by mouth Every Night., Disp: 90 tablet, Rfl:   •  bethanechol (URECHOLINE) 25 MG tablet, Take 25 mg by mouth 3 (Three) Times a Day., Disp: , Rfl:   •  cholecalciferol (VITAMIN D3) 25 MCG (1000 UT) tablet, Take 2,000 Units by mouth Daily., Disp: , Rfl:   •  estradiol (ESTRACE) 0.1 MG/GM vaginal cream, Use externally as directed 3 nights weekly. Use 0.5gm, Disp: 42.5 g, Rfl: 3  •  ferrous sulfate 325 (65 FE) MG tablet, Take 1 tablet by mouth Daily With Breakfast. 3 times a week, Disp: , Rfl:   •  fluticasone (FLONASE) 50 MCG/ACT nasal spray, 2 sprays into each nostril as needed. Administer 2 sprays in each nostril for each dose. , Disp: , Rfl:   •  hydroCHLOROthiazide (HYDRODIURIL) 25 MG tablet, Take 1 tablet by mouth Daily., Disp: , Rfl:   •  hydroxychloroquine (PLAQUENIL) 200 MG tablet, , Disp: , Rfl:   •  leflunomide (ARAVA) 20 MG tablet, Take 1 tablet by mouth Daily. (Patient not taking: Reported on 10/26/2022), Disp: , Rfl:   •  lisinopril (PRINIVIL,ZESTRIL) 20 MG tablet, Take 1 tablet by mouth Daily., Disp: , Rfl:   •  LORazepam (ATIVAN) 0.5 MG tablet, Take 0.5 mg by mouth every 8 (eight) hours as needed for anxiety., Disp: , Rfl:   •  metoprolol succinate XL (TOPROL-XL) 50 MG 24 hr tablet, Take 50 mg by mouth 2 (Two) Times a Day., Disp: , Rfl:   •  metoprolol tartrate (LOPRESSOR) 50 MG tablet, Take 1 tablet by mouth Every 12 (Twelve) Hours. (Patient not taking: Reported on 10/26/2022),  "Disp: , Rfl:   •  Metoprolol Tartrate 75 MG tablet, Take 75 tablets by mouth Every 12 (Twelve) Hours., Disp: , Rfl:   •  pantoprazole (PROTONIX) 40 MG EC tablet, Take 40 mg by mouth Daily., Disp: , Rfl:   •  potassium citrate (UROCIT-K) 10 MEQ (1080 MG) CR tablet, Take 10 mEq by mouth 2 (Two) Times a Day., Disp: , Rfl:   •  predniSONE (DELTASONE) 5 MG tablet, Take 1 tablet by mouth Daily., Disp: , Rfl:   •  valACYclovir (Valtrex) 500 MG tablet, Take two tablets by mouth twice daily for 1 week then decrease to one tablet by mouth twice daily. (Patient taking differently: Take one tablet by mouth twice daily for 1 week then decrease to one tablet by mouth twice daily.), Disp: 70 tablet, Rfl: 1    Allergies:   No Known Allergies    Objective     Physical Exam:   Vital Signs:   Visit Vitals  /88 (BP Location: Left arm, Patient Position: Sitting, Cuff Size: Adult)   Ht 162.6 cm (64.02\")   Wt 60.3 kg (133 lb)   BMI 22.82 kg/m²      Body mass index is 22.82 kg/m².     Physical exam notable for    Pessary Removed:  Foldable ring w/ support - #3 w/o urethral bar easily. No vaginal discharge or bleeding noted    Vagina was inspected and irrigated with acetic acid solution.    Pessary Re-Inserted:   Foldable ring w/ support - #3 w/o urethral bar with topical estradiol cream    Procedure was performed :without difficulty, scant bleeding from small skin tear posterior introitus    Patient will return if any bleeding or discharge concerns.    Recommend using topical vaginal estrogen cream 2 nights weekly      Follow Up:   Return in about 3 months (around 7/20/2023) for Follow up Sharrie-Pessary change.    MIHAI Montaño, NP-C  [facility] [place]   "

## 2023-10-30 ENCOUNTER — OFFICE VISIT (OUTPATIENT)
Dept: UROLOGY | Facility: CLINIC | Age: 78
End: 2023-10-30
Payer: MEDICARE

## 2023-10-30 VITALS
WEIGHT: 133 LBS | SYSTOLIC BLOOD PRESSURE: 118 MMHG | HEIGHT: 64 IN | DIASTOLIC BLOOD PRESSURE: 74 MMHG | BODY MASS INDEX: 22.71 KG/M2

## 2023-10-30 DIAGNOSIS — K59.03 DRUG-INDUCED CONSTIPATION: ICD-10-CM

## 2023-10-30 DIAGNOSIS — N81.4 CYSTOCELE WITH PROLAPSE: Primary | ICD-10-CM

## 2023-10-30 LAB
BILIRUB BLD-MCNC: NEGATIVE MG/DL
CLARITY, POC: ABNORMAL
COLOR UR: YELLOW
EXPIRATION DATE: ABNORMAL
GLUCOSE UR STRIP-MCNC: NEGATIVE MG/DL
KETONES UR QL: NEGATIVE
LEUKOCYTE EST, POC: ABNORMAL
Lab: ABNORMAL
NITRITE UR-MCNC: NEGATIVE MG/ML
PH UR: 6 [PH] (ref 5–8)
PROT UR STRIP-MCNC: ABNORMAL MG/DL
RBC # UR STRIP: NEGATIVE /UL
SP GR UR: 1.02 (ref 1–1.03)
UROBILINOGEN UR QL: NORMAL

## 2023-10-30 NOTE — PROGRESS NOTES
Pessary Cleaning     Patient Name: Vane Villavicencio  : 1945   MRN: [id number]     Chief Complaint:   Chief Complaint   Patient presents with    Follow-up     Cystocele with prolapse        History of Present Illness: Vane Villavicencio is a 78 y.o. female with history below in assessment, who presents for pessary cleaning.  She is doing well reports no problems with her pessary and no Uti symptoms  She is having some issues with constipation  She does have occasional UUI and nocturia  She will be going to [place] from December to April would like to discuss options for managing pessary while she is gone.      Subjective      Review of System:   As noted in HPI     Past Medical History:   Past Medical History:   Diagnosis Date    Asthma     Benign hypertension     Disease of thyroid gland     Family history of heart disease     Family history of heart disease     Hypercholesterolemia     Rheumatoid arthritis     Rheumatoid arthritis     Supraventricular tachycardia     first diagnosed 2012    Supraventricular tachycardia        Past Surgical History:   Past Surgical History:   Procedure Laterality Date    CARDIAC CATHETERIZATION N/A 7/10/2020    Procedure: Left Heart Cath;  Surgeon: Pankaj Pollard MD;  Location: [facility] CATH INVASIVE LOCATION;  Service: Cardiology;  Laterality: N/A;    SINUS SURGERY         Family History:   Family History   Problem Relation Age of Onset    Heart disease Other     Hypertension Sister        Social History:   Social History     Socioeconomic History    Marital status:    Tobacco Use    Smoking status: Former     Packs/day: 2     Types: Cigarettes     Quit date: 1970     Years since quittin.8    Smokeless tobacco: Never   Vaping Use    Vaping Use: Never used   Substance and Sexual Activity    Alcohol use: No    Drug use: No    Sexual activity: Defer       Medications:     Current Outpatient Medications:     acetaminophen (TYLENOL) 325 MG tablet, Take 2  tablets by mouth Every 6 (Six) Hours As Needed for Mild Pain., Disp: , Rfl:     acetaminophen-codeine (TYLENOL with CODEINE #3) 300-30 MG per tablet, Take 1 tablet by mouth Every 4 (Four) Hours As Needed for Moderate Pain (take at bedtime). As needed, Disp: , Rfl:     albuterol sulfate HFA (ProAir HFA) 108 (90 Base) MCG/ACT inhaler, Inhale 2 puffs As Needed., Disp: , Rfl:     amLODIPine (NORVASC) 5 MG tablet, Take 1 tablet by mouth Daily., Disp: , Rfl:     aspirin 325 MG tablet, Take 1 tablet by mouth Daily., Disp: , Rfl:     aspirin 81 MG chewable tablet, Chew 81 mg Daily. (Patient not taking: Reported on 7/21/2023), Disp: , Rfl:     aspirin 81 MG EC tablet, Take 1 tablet by mouth Daily., Disp: , Rfl:     atorvastatin (LIPITOR) 80 MG tablet, Take 1 tablet by mouth Every Night., Disp: 90 tablet, Rfl:     bethanechol (URECHOLINE) 25 MG tablet, Take 25 mg by mouth 3 (Three) Times a Day. (Patient not taking: Reported on 7/21/2023), Disp: , Rfl:     cholecalciferol (VITAMIN D3) 25 MCG (1000 UT) tablet, Take 2 tablets by mouth Daily., Disp: , Rfl:     Cyanocobalamin ER 1000 MCG tablet controlled-release, cyanocobalamin (vit B-12) ER 1,000 mcg tablet,extended release  TAKE 1 TABLET BY MOUTH EVERY DAY, Disp: , Rfl:     estradiol (ESTRACE) 0.1 MG/GM vaginal cream, Use externally as directed 3 nights weekly. Use 0.5gm, Disp: 42.5 g, Rfl: 3    ferrous sulfate 325 (65 FE) MG tablet, Take 1 tablet by mouth Daily With Breakfast. 3 times a week, Disp: , Rfl:     fluticasone (FLONASE) 50 MCG/ACT nasal spray, 2 sprays into the nostril(s) as directed by provider As Needed. Administer 2 sprays in each nostril for each dose., Disp: , Rfl:     hydroCHLOROthiazide (HYDRODIURIL) 25 MG tablet, Take 1 tablet by mouth Daily., Disp: , Rfl:     hydroxychloroquine (PLAQUENIL) 200 MG tablet, , Disp: , Rfl:     leflunomide (ARAVA) 20 MG tablet, Take 1 tablet by mouth Daily., Disp: , Rfl:     lisinopril (PRINIVIL,ZESTRIL) 20 MG tablet, Take 1  "tablet by mouth Daily., Disp: , Rfl:     LORazepam (ATIVAN) 0.5 MG tablet, Take 1 tablet by mouth Every 8 (Eight) Hours As Needed for Anxiety., Disp: , Rfl:     metoprolol succinate XL (TOPROL-XL) 50 MG 24 hr tablet, Take 50 mg by mouth 2 (Two) Times a Day. (Patient not taking: Reported on 7/21/2023), Disp: , Rfl:     metoprolol tartrate (LOPRESSOR) 50 MG tablet, Take 1 tablet by mouth Every 12 (Twelve) Hours. (Patient not taking: Reported on 10/26/2022), Disp: , Rfl:     Metoprolol Tartrate 75 MG tablet, Take 75 tablets by mouth Every 12 (Twelve) Hours. (Patient not taking: Reported on 7/21/2023), Disp: , Rfl:     pantoprazole (PROTONIX) 40 MG EC tablet, Take 40 mg by mouth Daily. (Patient not taking: Reported on 7/21/2023), Disp: , Rfl:     potassium citrate (UROCIT-K) 10 MEQ (1080 MG) CR tablet, Take 1 tablet by mouth 2 (Two) Times a Day., Disp: , Rfl:     predniSONE (DELTASONE) 5 MG tablet, Take 1 tablet by mouth Daily., Disp: , Rfl:     sulfaSALAzine (AZULFIDINE) 500 MG tablet, Take 1 tablet by mouth 2 (Two) Times a Day., Disp: , Rfl:     valACYclovir (Valtrex) 500 MG tablet, Take two tablets by mouth twice daily for 1 week then decrease to one tablet by mouth twice daily. (Patient taking differently: Take one tablet by mouth twice daily for 1 week then decrease to one tablet by mouth twice daily.), Disp: 70 tablet, Rfl: 1    Allergies:   No Known Allergies    Objective     Physical Exam:   Vital Signs:   Visit Vitals  /74 (BP Location: Left arm, Patient Position: Sitting, Cuff Size: Adult)   Ht 162.6 cm (64.02\")   Wt 60.3 kg (133 lb)   BMI 22.82 kg/m²      Body mass index is 22.82 kg/m².     Physical exam notable for     Pessary Removed:  Foldable ring w/ support - #3 w/o urethral bar easily. No vaginal discharge or bleeding noted    Vagina was inspected and irrigated with acetic acid solution.    Pessary Re-Inserted:   Foldable ring w/ support - #3 w/o urethral bar with topical estradiol " cream    Procedure was performed :without difficulty.    Patient will return if any bleeding or discharge concerns.  We discussed having patient return in 6 weeks and getting pessary cleaned prior to her leaving for [place] this way if she cannot find someone to clean her pessary in [place] when she comes back in April this will not be too far extended past at the normal 3-week cleaning.    We discussed UUI and nocturia patient is not interested in medical management at this time it is not bothersome.  She does have issues with constipation I recommend patient start MiraLAX daily constipation can increase issues of urinary urgency and frequency.    PVR  Post-void residual performed with ultrasound scanner by staff and interpreted by me - 37 ml        Follow Up:   Return in about 6 weeks (around 12/11/2023).    MIHAI Montaño, NP-C  [facility] [place]

## 2023-12-14 ENCOUNTER — OFFICE VISIT (OUTPATIENT)
Dept: UROLOGY | Facility: CLINIC | Age: 78
End: 2023-12-14
Payer: MEDICARE

## 2023-12-14 VITALS — WEIGHT: 133 LBS | HEIGHT: 64 IN | BODY MASS INDEX: 22.71 KG/M2

## 2023-12-14 DIAGNOSIS — N81.4 CYSTOCELE WITH PROLAPSE: Primary | ICD-10-CM

## 2023-12-14 NOTE — PROGRESS NOTES
Pessary Cleaning     Patient Name: Vane Villavicencio  : 1945   MRN: [id number]     Chief Complaint:   Chief Complaint   Patient presents with    Follow-up     Pessary cleaning       History of Present Illness: Vane Villavicencio is a 78 y.o. female with history below in assessment, who presents for pessary cleaning.  No complaints  Does have vaginal discharge, no odor or symptoms     Subjective      Review of System:   As noted in HPI     Past Medical History:   Past Medical History:   Diagnosis Date    Asthma     Benign hypertension     Disease of thyroid gland     Family history of heart disease     Family history of heart disease     Hypercholesterolemia     Rheumatoid arthritis     Rheumatoid arthritis     Supraventricular tachycardia     first diagnosed 2012    Supraventricular tachycardia        Past Surgical History:   Past Surgical History:   Procedure Laterality Date    CARDIAC CATHETERIZATION N/A 7/10/2020    Procedure: Left Heart Cath;  Surgeon: Pankaj Pollard MD;  Location: [facility] CATH INVASIVE LOCATION;  Service: Cardiology;  Laterality: N/A;    SINUS SURGERY         Family History:   Family History   Problem Relation Age of Onset    Heart disease Other     Hypertension Sister        Social History:   Social History     Socioeconomic History    Marital status:    Tobacco Use    Smoking status: Former     Packs/day: 2     Types: Cigarettes     Quit date: 1970     Years since quittin.9    Smokeless tobacco: Never   Vaping Use    Vaping Use: Never used   Substance and Sexual Activity    Alcohol use: No    Drug use: No    Sexual activity: Defer       Medications:     Current Outpatient Medications:     acetaminophen (TYLENOL) 325 MG tablet, Take 2 tablets by mouth Every 6 (Six) Hours As Needed for Mild Pain., Disp: , Rfl:     acetaminophen-codeine (TYLENOL with CODEINE #3) 300-30 MG per tablet, Take 1 tablet by mouth Every 4 (Four) Hours As Needed for Moderate Pain (take at  bedtime). As needed, Disp: , Rfl:     albuterol sulfate HFA (ProAir HFA) 108 (90 Base) MCG/ACT inhaler, Inhale 2 puffs As Needed., Disp: , Rfl:     amLODIPine (NORVASC) 5 MG tablet, Take 1 tablet by mouth Daily., Disp: , Rfl:     aspirin 325 MG tablet, Take 1 tablet by mouth Daily., Disp: , Rfl:     aspirin 81 MG EC tablet, Take 1 tablet by mouth Daily., Disp: , Rfl:     atorvastatin (LIPITOR) 80 MG tablet, Take 1 tablet by mouth Every Night., Disp: 90 tablet, Rfl:     bethanechol (URECHOLINE) 25 MG tablet, Take 25 mg by mouth 3 (Three) Times a Day. (Patient not taking: Reported on 7/21/2023), Disp: , Rfl:     cholecalciferol (VITAMIN D3) 25 MCG (1000 UT) tablet, Take 2 tablets by mouth Daily., Disp: , Rfl:     Cyanocobalamin ER 1000 MCG tablet controlled-release, cyanocobalamin (vit B-12) ER 1,000 mcg tablet,extended release  TAKE 1 TABLET BY MOUTH EVERY DAY, Disp: , Rfl:     estradiol (ESTRACE) 0.1 MG/GM vaginal cream, Use externally as directed 3 nights weekly. Use 0.5gm, Disp: 42.5 g, Rfl: 3    ferrous sulfate 325 (65 FE) MG tablet, Take 1 tablet by mouth Daily With Breakfast. 3 times a week, Disp: , Rfl:     fluticasone (FLONASE) 50 MCG/ACT nasal spray, 2 sprays into the nostril(s) as directed by provider As Needed. Administer 2 sprays in each nostril for each dose., Disp: , Rfl:     hydroCHLOROthiazide (HYDRODIURIL) 25 MG tablet, Take 1 tablet by mouth Daily., Disp: , Rfl:     hydroxychloroquine (PLAQUENIL) 200 MG tablet, , Disp: , Rfl:     leflunomide (ARAVA) 20 MG tablet, Take 1 tablet by mouth Daily., Disp: , Rfl:     lisinopril (PRINIVIL,ZESTRIL) 20 MG tablet, Take 1 tablet by mouth Daily., Disp: , Rfl:     LORazepam (ATIVAN) 0.5 MG tablet, Take 1 tablet by mouth Every 8 (Eight) Hours As Needed for Anxiety., Disp: , Rfl:     metoprolol succinate XL (TOPROL-XL) 50 MG 24 hr tablet, Take 50 mg by mouth 2 (Two) Times a Day. (Patient not taking: Reported on 7/21/2023), Disp: , Rfl:     metoprolol tartrate  "(LOPRESSOR) 50 MG tablet, Take 1 tablet by mouth Every 12 (Twelve) Hours. (Patient not taking: Reported on 10/26/2022), Disp: , Rfl:     Metoprolol Tartrate 75 MG tablet, Take 75 tablets by mouth Every 12 (Twelve) Hours. (Patient not taking: Reported on 7/21/2023), Disp: , Rfl:     pantoprazole (PROTONIX) 40 MG EC tablet, Take 40 mg by mouth Daily. (Patient not taking: Reported on 7/21/2023), Disp: , Rfl:     potassium citrate (UROCIT-K) 10 MEQ (1080 MG) CR tablet, Take 1 tablet by mouth 2 (Two) Times a Day., Disp: , Rfl:     predniSONE (DELTASONE) 5 MG tablet, Take 1 tablet by mouth Daily., Disp: , Rfl:     sulfaSALAzine (AZULFIDINE) 500 MG tablet, Take 1 tablet by mouth 2 (Two) Times a Day., Disp: , Rfl:     valACYclovir (Valtrex) 500 MG tablet, Take two tablets by mouth twice daily for 1 week then decrease to one tablet by mouth twice daily. (Patient taking differently: Take one tablet by mouth twice daily for 1 week then decrease to one tablet by mouth twice daily.), Disp: 70 tablet, Rfl: 1    Allergies:   No Known Allergies    Objective     Physical Exam:   Vital Signs:   Visit Vitals  Ht 162.6 cm (64.02\")   Wt 60.3 kg (133 lb)   BMI 22.82 kg/m²      Body mass index is 22.82 kg/m².     Physical exam notable for    Pessary Removed:  Foldable ring w/ support - #3 w/o urethral bar easily. No vaginal discharge or bleeding noted    Vagina was inspected and irrigated with acetic acid solution.    Pessary Re-Inserted:   Foldable ring w/ support - #3 w/o urethral bar with topical estradiol cream    Procedure was performed : without difficulty.    Patient will return if any bleeding or discharge concerns.    Patient travels to [place] for the winter and will be back in April. We discussed if she has any issues with her pessary she will follow up with her sisters urologist in [place] and will have a planned cleaning with me in April.     Follow Up:   Return in about 4 months (around 4/14/2024) for Follow up " Jim.    MIHAI Montaño, NP-C  [facility] [place]

## 2024-05-09 ENCOUNTER — OFFICE VISIT (OUTPATIENT)
Dept: UROLOGY | Facility: CLINIC | Age: 79
End: 2024-05-09
Payer: MEDICARE

## 2024-05-09 VITALS
OXYGEN SATURATION: 99 % | WEIGHT: 133 LBS | SYSTOLIC BLOOD PRESSURE: 108 MMHG | HEART RATE: 66 BPM | BODY MASS INDEX: 22.71 KG/M2 | DIASTOLIC BLOOD PRESSURE: 62 MMHG | TEMPERATURE: 97.8 F | HEIGHT: 64 IN

## 2024-05-09 DIAGNOSIS — N39.41 URGE INCONTINENCE: ICD-10-CM

## 2024-05-09 DIAGNOSIS — R39.9 LOWER URINARY TRACT SYMPTOMS (LUTS): Primary | ICD-10-CM

## 2024-05-09 DIAGNOSIS — N95.8 GENITOURINARY SYNDROME OF MENOPAUSE: ICD-10-CM

## 2024-05-09 LAB
BILIRUB BLD-MCNC: NEGATIVE MG/DL
CLARITY, POC: ABNORMAL
COLOR UR: ABNORMAL
EXPIRATION DATE: ABNORMAL
GLUCOSE UR STRIP-MCNC: NEGATIVE MG/DL
KETONES UR QL: NEGATIVE
LEUKOCYTE EST, POC: ABNORMAL
Lab: ABNORMAL
NITRITE UR-MCNC: NEGATIVE MG/ML
PH UR: 6 [PH] (ref 5–8)
PROT UR STRIP-MCNC: ABNORMAL MG/DL
RBC # UR STRIP: NEGATIVE /UL
SP GR UR: 1.02 (ref 1–1.03)
UROBILINOGEN UR QL: NORMAL

## 2024-05-09 RX ORDER — ESTRADIOL 0.1 MG/G
CREAM VAGINAL
Qty: 42.5 G | Refills: 3 | Status: SHIPPED | OUTPATIENT
Start: 2024-05-09

## 2024-06-07 ENCOUNTER — TELEPHONE (OUTPATIENT)
Dept: UROLOGY | Facility: CLINIC | Age: 79
End: 2024-06-07

## 2024-06-07 NOTE — TELEPHONE ENCOUNTER
Provider: CHRISTY RAY    Caller: BAILEE ESCOBEDO    Relationship to Patient: MACARIO GERMAIN KATZ    Reason for Call: PT CALLED TO ADVISE THE MYRBETRIQ IS TOO EXPENSIVE OVER $200 PER MONTH    PT HAS REQUESTED FOR ANOTHER MEDICATION TO BE PRESCRIBED.    PLS CALL PT TO CONFIRM

## 2024-06-17 NOTE — TELEPHONE ENCOUNTER
Hub staff attempted to follow warm transfer process and was unsuccessful     Caller: Vane Villavicencio    Relationship to patient: Self    Best call back number: [phone] (home)       Patient is needing: PT IS RETURNING A CALL TO THE OFFICE REGARDING MEDICATION. PLEASE CALL PT BACK TO DISCUSS.THANK YOU.

## 2024-06-18 DIAGNOSIS — N39.41 URGE INCONTINENCE: Primary | ICD-10-CM

## 2024-06-18 RX ORDER — MIRABEGRON 25 MG/1
25 TABLET, FILM COATED, EXTENDED RELEASE ORAL DAILY
Qty: 90 TABLET | Refills: 3 | Status: SHIPPED | OUTPATIENT
Start: 2024-06-18

## 2024-06-18 NOTE — TELEPHONE ENCOUNTER
Called patient discussed we will try sending Myrbetriq in as generic to see if this will decrease her out-of-pocket cost.  She will let me know what the cost is if it is not affordable for her.

## 2024-06-18 NOTE — TELEPHONE ENCOUNTER
Caller: aVne Villavicencio    Relationship: Self    Best call back number: [phone]    What is the best time to reach you: ANYTIME    Who are you requesting to speak with (clinical staff, provider,  specific staff member): CHRISTY    Do you know the name of the person who called: CHRISTY    What was the call regarding: MEDICATION    Is it okay if the provider responds through MyChart: NO

## 2024-08-14 ENCOUNTER — OFFICE VISIT (OUTPATIENT)
Dept: UROLOGY | Facility: CLINIC | Age: 79
End: 2024-08-14
Payer: MEDICARE

## 2024-08-14 VITALS
WEIGHT: 130 LBS | DIASTOLIC BLOOD PRESSURE: 78 MMHG | SYSTOLIC BLOOD PRESSURE: 124 MMHG | HEIGHT: 62 IN | BODY MASS INDEX: 23.92 KG/M2

## 2024-08-14 DIAGNOSIS — N39.41 URGE INCONTINENCE: Primary | ICD-10-CM

## 2024-08-14 DIAGNOSIS — N81.4 CYSTOCELE WITH PROLAPSE: ICD-10-CM

## 2024-08-14 RX ORDER — VIBEGRON 75 MG/1
75 TABLET, FILM COATED ORAL DAILY
Qty: 30 TABLET | Refills: 0 | COMMUNITY
Start: 2024-08-14 | End: 2024-09-13

## 2024-08-14 RX ORDER — VIBEGRON 75 MG/1
75 TABLET, FILM COATED ORAL DAILY
Qty: 90 TABLET | Refills: 3 | Status: SHIPPED | OUTPATIENT
Start: 2024-08-14

## 2024-08-14 RX ORDER — ROSUVASTATIN CALCIUM 20 MG/1
TABLET, COATED ORAL
COMMUNITY
Start: 2024-07-29

## 2024-08-14 RX ORDER — METOPROLOL SUCCINATE 50 MG/1
TABLET, EXTENDED RELEASE ORAL
COMMUNITY
Start: 2024-08-06

## 2024-08-14 RX ORDER — KETOCONAZOLE 20 MG/G
CREAM TOPICAL
COMMUNITY
Start: 2024-05-06

## 2024-08-14 NOTE — PROGRESS NOTES
Pessary Cleaning     Patient Name: Vane Villavicencio  : 1945   MRN: [id number]     Chief Complaint:   Chief Complaint   Patient presents with    Follow-up     Pessary        History of Present Illness: Vane Villavicencio is a 79 y.o. female with history below in assessment, who presents for pessary cleaning.  She trialed Myrbetriq 25 mg feels that this did help with her nocturia but was over $400 monthly with insurance.  She denies problems with pessary and is using her topical vaginal estradiol when she remembers      Subjective      Review of System:   As noted in HPI     Past Medical History:   Past Medical History:   Diagnosis Date    Asthma     Benign hypertension     Disease of thyroid gland     Family history of heart disease     Family history of heart disease     Hypercholesterolemia     Rheumatoid arthritis     Rheumatoid arthritis     Supraventricular tachycardia     first diagnosed 2012    Supraventricular tachycardia        Past Surgical History:   Past Surgical History:   Procedure Laterality Date    CARDIAC CATHETERIZATION N/A 7/10/2020    Procedure: Left Heart Cath;  Surgeon: Pankaj Pollard MD;  Location: [facility] CATH INVASIVE LOCATION;  Service: Cardiology;  Laterality: N/A;    SINUS SURGERY         Family History:   Family History   Problem Relation Age of Onset    Heart disease Other     Hypertension Sister        Social History:   Social History     Socioeconomic History    Marital status:    Tobacco Use    Smoking status: Former     Current packs/day: 0.00     Types: Cigarettes     Quit date: 1970     Years since quittin.6     Passive exposure: Past    Smokeless tobacco: Never   Vaping Use    Vaping status: Never Used   Substance and Sexual Activity    Alcohol use: No    Drug use: No    Sexual activity: Defer       Medications:     Current Outpatient Medications:     acetaminophen (TYLENOL) 325 MG tablet, Take 2 tablets by mouth Every 6 (Six) Hours As Needed for  Mild Pain., Disp: , Rfl:     acetaminophen-codeine (TYLENOL with CODEINE #3) 300-30 MG per tablet, Take 1 tablet by mouth Every 4 (Four) Hours As Needed for Moderate Pain (take at bedtime). As needed, Disp: , Rfl:     albuterol sulfate HFA (ProAir HFA) 108 (90 Base) MCG/ACT inhaler, Inhale 2 puffs As Needed., Disp: , Rfl:     amLODIPine (NORVASC) 5 MG tablet, Take 1 tablet by mouth Daily., Disp: , Rfl:     aspirin 81 MG EC tablet, Take 1 tablet by mouth Daily., Disp: , Rfl:     atorvastatin (LIPITOR) 80 MG tablet, Take 1 tablet by mouth Every Night., Disp: 90 tablet, Rfl:     cholecalciferol (VITAMIN D3) 25 MCG (1000 UT) tablet, Take 2 tablets by mouth Daily., Disp: , Rfl:     Cyanocobalamin ER 1000 MCG tablet controlled-release, cyanocobalamin (vit B-12) ER 1,000 mcg tablet,extended release  TAKE 1 TABLET BY MOUTH EVERY DAY, Disp: , Rfl:     estradiol (ESTRACE) 0.1 MG/GM vaginal cream, Use externally as directed 3 nights weekly. Use 0.5gm, Disp: 42.5 g, Rfl: 3    ferrous sulfate 325 (65 FE) MG tablet, Take 1 tablet by mouth Daily With Breakfast. 3 times a week, Disp: , Rfl:     fluticasone (FLONASE) 50 MCG/ACT nasal spray, 2 sprays into the nostril(s) as directed by provider As Needed. Administer 2 sprays in each nostril for each dose., Disp: , Rfl:     hydroCHLOROthiazide (HYDRODIURIL) 25 MG tablet, Take 1 tablet by mouth Daily., Disp: , Rfl:     hydroxychloroquine (PLAQUENIL) 200 MG tablet, , Disp: , Rfl:     ketoconazole (NIZORAL) 2 % cream, APPLY THIN LAYER TOPICALLY TO FEET TWICE DAILY UNTIL GONE, Disp: , Rfl:     leflunomide (ARAVA) 20 MG tablet, Take 1 tablet by mouth Daily., Disp: , Rfl:     lisinopril (PRINIVIL,ZESTRIL) 20 MG tablet, Take 1 tablet by mouth Daily., Disp: , Rfl:     LORazepam (ATIVAN) 0.5 MG tablet, Take 1 tablet by mouth Every 8 (Eight) Hours As Needed for Anxiety., Disp: , Rfl:     metoprolol succinate XL (TOPROL-XL) 50 MG 24 hr tablet, , Disp: , Rfl:     pantoprazole (PROTONIX) 40 MG EC  "tablet, Take 1 tablet by mouth Daily., Disp: , Rfl:     potassium citrate (UROCIT-K) 10 MEQ (1080 MG) CR tablet, Take 1 tablet by mouth 2 (Two) Times a Day., Disp: , Rfl:     predniSONE (DELTASONE) 5 MG tablet, Take 1 tablet by mouth Daily., Disp: , Rfl:     rosuvastatin (CRESTOR) 20 MG tablet, , Disp: , Rfl:     sulfaSALAzine (AZULFIDINE) 500 MG tablet, Take 1 tablet by mouth 2 (Two) Times a Day., Disp: , Rfl:     valACYclovir (Valtrex) 500 MG tablet, Take two tablets by mouth twice daily for 1 week then decrease to one tablet by mouth twice daily. (Patient taking differently: Take one tablet by mouth twice daily for 1 week then decrease to one tablet by mouth twice daily.), Disp: 70 tablet, Rfl: 1    Vibegron (Gemtesa) 75 MG tablet, Take 1 tablet by mouth Daily., Disp: 90 tablet, Rfl: 3    Allergies:   No Known Allergies    Objective     Physical Exam:   Vital Signs:   Visit Vitals  /78 (BP Location: Left arm, Patient Position: Sitting, Cuff Size: Adult)   Ht 157.5 cm (62\")   Wt 59 kg (130 lb)   BMI 23.78 kg/m²      Body mass index is 23.78 kg/m².     Diagnoses and all orders for this visit:    1. Urge incontinence (Primary)  -     Vibegron (Gemtesa) 75 MG tablet; Take 1 tablet by mouth Daily.  Dispense: 90 tablet; Refill: 3    2. Cystocele with prolapse    79-year-old female here for pessary exchange and to follow-up after starting Myrbetriq 25 mg for urge incontinence and nocturia.  Myrbetriq improved symptoms but unfortunately was $400 monthly with insurance, we discussed trying Gemtesa it looks like insurance may cover this medication we will send to [facility] and if cost is too much we will try sending to [facility].    1.  Stop Myrbetriq and start Gemtesa 75 mg daily 30-day sample given    Physical exam notable for thin pale vulva, no ulcerations or lesion.  Vaginal mucosa healthy appearance    Pessary Removed:  Foldable ring w/ support - #3 w/o urethral bar easily. No vaginal discharge or " bleeding noted    Vagina was inspected and irrigated with acetic acid solution.    Pessary Re-Inserted:   Foldable ring w/ support - #3 w/o urethral bar with topical estradiol cream    Procedure was performed :without difficulty.    Patient will return if any bleeding or discharge concerns.      Follow Up:   Return in about 3 months (around 11/14/2024) for Follow up Sharrie-Pessary cleaning.    MIHAI Montaño, NP-C  [facility] [place] [facility]

## 2024-11-13 ENCOUNTER — OFFICE VISIT (OUTPATIENT)
Dept: UROLOGY | Facility: CLINIC | Age: 79
End: 2024-11-13
Payer: MEDICARE

## 2024-11-13 VITALS
RESPIRATION RATE: 19 BRPM | DIASTOLIC BLOOD PRESSURE: 78 MMHG | WEIGHT: 130 LBS | SYSTOLIC BLOOD PRESSURE: 124 MMHG | HEIGHT: 62 IN | BODY MASS INDEX: 23.92 KG/M2

## 2024-11-13 DIAGNOSIS — N81.4 CYSTOCELE WITH PROLAPSE: Primary | ICD-10-CM

## 2024-11-13 NOTE — PROGRESS NOTES
Pessary Cleaning     Patient Name: Vane Villavicencio  : 1945   MRN: [id number]     Chief Complaint:   Chief Complaint   Patient presents with    Bladder Prolapse     3 month pessary cleaning         History of Present Illness: Vane Villavicencio is a 79 y.o. female with history below in assessment, who presents for pessary cleaning.  Doing well no complaints        Subjective      Review of System:   As noted in HPI     Past Medical History:   Past Medical History:   Diagnosis Date    Asthma     Benign hypertension     Disease of thyroid gland     Family history of heart disease     Family history of heart disease     Hypercholesterolemia     Rheumatoid arthritis     Rheumatoid arthritis     Supraventricular tachycardia     first diagnosed 2012    Supraventricular tachycardia        Past Surgical History:   Past Surgical History:   Procedure Laterality Date    CARDIAC CATHETERIZATION N/A 7/10/2020    Procedure: Left Heart Cath;  Surgeon: Pankaj Pollard MD;  Location: [facility] CATH INVASIVE LOCATION;  Service: Cardiology;  Laterality: N/A;    SINUS SURGERY         Family History:   Family History   Problem Relation Age of Onset    Heart disease Other     Hypertension Sister        Social History:   Social History     Socioeconomic History    Marital status:    Tobacco Use    Smoking status: Former     Current packs/day: 0.00     Types: Cigarettes     Quit date: 1970     Years since quittin.9     Passive exposure: Past    Smokeless tobacco: Never   Vaping Use    Vaping status: Never Used   Substance and Sexual Activity    Alcohol use: No    Drug use: No    Sexual activity: Defer       Medications:     Current Outpatient Medications:     acetaminophen (TYLENOL) 325 MG tablet, Take 2 tablets by mouth Every 6 (Six) Hours As Needed for Mild Pain., Disp: , Rfl:     acetaminophen-codeine (TYLENOL with CODEINE #3) 300-30 MG per tablet, Take 1 tablet by mouth Every 4 (Four) Hours As Needed for  Moderate Pain (take at bedtime). As needed, Disp: , Rfl:     albuterol sulfate HFA (ProAir HFA) 108 (90 Base) MCG/ACT inhaler, Inhale 2 puffs As Needed., Disp: , Rfl:     amLODIPine (NORVASC) 5 MG tablet, Take 1 tablet by mouth Daily., Disp: , Rfl:     aspirin 81 MG EC tablet, Take 1 tablet by mouth Daily., Disp: , Rfl:     atorvastatin (LIPITOR) 80 MG tablet, Take 1 tablet by mouth Every Night., Disp: 90 tablet, Rfl:     cholecalciferol (VITAMIN D3) 25 MCG (1000 UT) tablet, Take 2 tablets by mouth Daily., Disp: , Rfl:     Cyanocobalamin ER 1000 MCG tablet controlled-release, cyanocobalamin (vit B-12) ER 1,000 mcg tablet,extended release  TAKE 1 TABLET BY MOUTH EVERY DAY, Disp: , Rfl:     estradiol (ESTRACE) 0.1 MG/GM vaginal cream, Use externally as directed 3 nights weekly. Use 0.5gm, Disp: 42.5 g, Rfl: 3    ferrous sulfate 325 (65 FE) MG tablet, Take 1 tablet by mouth Daily With Breakfast. 3 times a week, Disp: , Rfl:     fluticasone (FLONASE) 50 MCG/ACT nasal spray, 2 sprays into the nostril(s) as directed by provider As Needed. Administer 2 sprays in each nostril for each dose., Disp: , Rfl:     hydroCHLOROthiazide (HYDRODIURIL) 25 MG tablet, Take 1 tablet by mouth Daily., Disp: , Rfl:     hydroxychloroquine (PLAQUENIL) 200 MG tablet, , Disp: , Rfl:     ketoconazole (NIZORAL) 2 % cream, APPLY THIN LAYER TOPICALLY TO FEET TWICE DAILY UNTIL GONE, Disp: , Rfl:     leflunomide (ARAVA) 20 MG tablet, Take 1 tablet by mouth Daily., Disp: , Rfl:     lisinopril (PRINIVIL,ZESTRIL) 20 MG tablet, Take 1 tablet by mouth Daily., Disp: , Rfl:     LORazepam (ATIVAN) 0.5 MG tablet, Take 1 tablet by mouth Every 8 (Eight) Hours As Needed for Anxiety., Disp: , Rfl:     metoprolol succinate XL (TOPROL-XL) 50 MG 24 hr tablet, , Disp: , Rfl:     pantoprazole (PROTONIX) 40 MG EC tablet, Take 1 tablet by mouth Daily., Disp: , Rfl:     potassium citrate (UROCIT-K) 10 MEQ (1080 MG) CR tablet, Take 1 tablet by mouth 2 (Two) Times a Day.,  "Disp: , Rfl:     predniSONE (DELTASONE) 5 MG tablet, Take 1 tablet by mouth Daily., Disp: , Rfl:     rosuvastatin (CRESTOR) 20 MG tablet, , Disp: , Rfl:     sulfaSALAzine (AZULFIDINE) 500 MG tablet, Take 1 tablet by mouth 2 (Two) Times a Day., Disp: , Rfl:     valACYclovir (Valtrex) 500 MG tablet, Take two tablets by mouth twice daily for 1 week then decrease to one tablet by mouth twice daily. (Patient taking differently: Take one tablet by mouth twice daily for 1 week then decrease to one tablet by mouth twice daily.), Disp: 70 tablet, Rfl: 1    Vibegron (Gemtesa) 75 MG tablet, Take 1 tablet by mouth Daily., Disp: 90 tablet, Rfl: 3    Allergies:   No Known Allergies    Objective     Physical Exam:   Vital Signs:   Visit Vitals  /78   Resp 19   Ht 157.5 cm (62.01\")   Wt 59 kg (130 lb)   BMI 23.77 kg/m²      Body mass index is 23.77 kg/m².     Diagnoses and all orders for this visit:    1. Cystocele with prolapse (Primary)     Physical exam notable for healthy vaginal tissue no ulcerations or lesions    Pessary Removed:  Foldable ring w/ support - #3 w/o urethral bar easily. No vaginal discharge or bleeding noted    Vagina was inspected and irrigated with acetic acid solution.    Pessary Re-Inserted:   Foldable ring w/ support - #3 w/o urethral bar with topical estradiol cream    Procedure was performed :without difficulty.    Patient will return if any bleeding or discharge concerns.      Follow Up:   Return in about 4 weeks (around 12/11/2024) for Follow up Jim.    MIHAI Montaño, NP-C  [facility] [place] [facility] [place]   "

## 2025-01-21 ENCOUNTER — TRANSCRIBE ORDERS (OUTPATIENT)
Dept: ADMINISTRATIVE | Facility: HOSPITAL | Age: 80
End: 2025-01-21
Payer: MEDICARE

## 2025-01-21 ENCOUNTER — TRANSCRIBE ORDERS (OUTPATIENT)
Dept: GENERAL RADIOLOGY | Facility: HOSPITAL | Age: 80
End: 2025-01-21
Payer: MEDICARE

## 2025-01-21 DIAGNOSIS — I73.9 PERIPHERAL VASCULAR DISEASE, UNSPECIFIED: Primary | ICD-10-CM

## 2025-01-28 ENCOUNTER — OFFICE VISIT (OUTPATIENT)
Dept: UROLOGY | Facility: CLINIC | Age: 80
End: 2025-01-28
Payer: MEDICARE

## 2025-01-28 VITALS
SYSTOLIC BLOOD PRESSURE: 122 MMHG | WEIGHT: 130 LBS | HEART RATE: 78 BPM | DIASTOLIC BLOOD PRESSURE: 78 MMHG | BODY MASS INDEX: 23.92 KG/M2 | OXYGEN SATURATION: 100 % | TEMPERATURE: 98.2 F | HEIGHT: 62 IN

## 2025-01-28 DIAGNOSIS — N81.4 CYSTOCELE WITH PROLAPSE: Primary | ICD-10-CM

## 2025-01-28 RX ORDER — FAMOTIDINE 20 MG/1
TABLET, FILM COATED ORAL
COMMUNITY
Start: 2024-11-07

## 2025-01-28 NOTE — PROGRESS NOTES
Pessary Cleaning     Patient Name: Vane Villavicencio  : 1945   MRN: [id number]     Chief Complaint:   Chief Complaint   Patient presents with    Follow-up     Cystocele with prolapse       History of Present Illness: Vane Villavicencio is a 79 y.o. female with history below in assessment, who presents for pessary cleaning.  She is doing well denies problems with her pessary, no recent UTIs    Subjective      Review of System:   As noted in HPI     Past Medical History:   Past Medical History:   Diagnosis Date    Asthma     Benign hypertension     Disease of thyroid gland     Family history of heart disease     Family history of heart disease     Hypercholesterolemia     Rheumatoid arthritis     Rheumatoid arthritis     Supraventricular tachycardia     first diagnosed 2012    Supraventricular tachycardia        Past Surgical History:   Past Surgical History:   Procedure Laterality Date    CARDIAC CATHETERIZATION N/A 7/10/2020    Procedure: Left Heart Cath;  Surgeon: Pankaj Pollard MD;  Location: [facility] CATH INVASIVE LOCATION;  Service: Cardiology;  Laterality: N/A;    SINUS SURGERY         Family History:   Family History   Problem Relation Age of Onset    Heart disease Other     Hypertension Sister        Social History:   Social History     Socioeconomic History    Marital status:    Tobacco Use    Smoking status: Former     Current packs/day: 0.00     Types: Cigarettes     Quit date:      Years since quittin.1     Passive exposure: Past    Smokeless tobacco: Never   Vaping Use    Vaping status: Never Used   Substance and Sexual Activity    Alcohol use: No    Drug use: No    Sexual activity: Defer       Medications:     Current Outpatient Medications:     acetaminophen (TYLENOL) 325 MG tablet, Take 2 tablets by mouth Every 6 (Six) Hours As Needed for Mild Pain., Disp: , Rfl:     acetaminophen-codeine (TYLENOL with CODEINE #3) 300-30 MG per tablet, Take 1 tablet by mouth Every 4  (Four) Hours As Needed for Moderate Pain (take at bedtime). As needed, Disp: , Rfl:     albuterol sulfate HFA (ProAir HFA) 108 (90 Base) MCG/ACT inhaler, Inhale 2 puffs As Needed., Disp: , Rfl:     amLODIPine (NORVASC) 5 MG tablet, Take 1 tablet by mouth Daily., Disp: , Rfl:     aspirin 81 MG EC tablet, Take 1 tablet by mouth Daily., Disp: , Rfl:     atorvastatin (LIPITOR) 80 MG tablet, Take 1 tablet by mouth Every Night., Disp: 90 tablet, Rfl:     cholecalciferol (VITAMIN D3) 25 MCG (1000 UT) tablet, Take 2 tablets by mouth Daily., Disp: , Rfl:     Cyanocobalamin ER 1000 MCG tablet controlled-release, cyanocobalamin (vit B-12) ER 1,000 mcg tablet,extended release  TAKE 1 TABLET BY MOUTH EVERY DAY, Disp: , Rfl:     estradiol (ESTRACE) 0.1 MG/GM vaginal cream, Use externally as directed 3 nights weekly. Use 0.5gm, Disp: 42.5 g, Rfl: 3    famotidine (PEPCID) 20 MG tablet, , Disp: , Rfl:     ferrous sulfate 325 (65 FE) MG tablet, Take 1 tablet by mouth Daily With Breakfast. 3 times a week, Disp: , Rfl:     fluticasone (FLONASE) 50 MCG/ACT nasal spray, Administer 2 sprays into the nostril(s) as directed by provider As Needed. Administer 2 sprays in each nostril for each dose., Disp: , Rfl:     hydroCHLOROthiazide (HYDRODIURIL) 25 MG tablet, Take 1 tablet by mouth Daily., Disp: , Rfl:     hydroxychloroquine (PLAQUENIL) 200 MG tablet, , Disp: , Rfl:     ketoconazole (NIZORAL) 2 % cream, APPLY THIN LAYER TOPICALLY TO FEET TWICE DAILY UNTIL GONE, Disp: , Rfl:     leflunomide (ARAVA) 20 MG tablet, Take 1 tablet by mouth Daily., Disp: , Rfl:     lisinopril (PRINIVIL,ZESTRIL) 20 MG tablet, Take 1 tablet by mouth Daily., Disp: , Rfl:     LORazepam (ATIVAN) 0.5 MG tablet, Take 1 tablet by mouth Every 8 (Eight) Hours As Needed for Anxiety., Disp: , Rfl:     metoprolol succinate XL (TOPROL-XL) 50 MG 24 hr tablet, , Disp: , Rfl:     pantoprazole (PROTONIX) 40 MG EC tablet, Take 1 tablet by mouth Daily., Disp: , Rfl:     potassium  "citrate (UROCIT-K) 10 MEQ (1080 MG) CR tablet, Take 1 tablet by mouth 2 (Two) Times a Day., Disp: , Rfl:     predniSONE (DELTASONE) 5 MG tablet, Take 1 tablet by mouth Daily., Disp: , Rfl:     rosuvastatin (CRESTOR) 20 MG tablet, , Disp: , Rfl:     sulfaSALAzine (AZULFIDINE) 500 MG tablet, Take 1 tablet by mouth 2 (Two) Times a Day., Disp: , Rfl:     valACYclovir (Valtrex) 500 MG tablet, Take two tablets by mouth twice daily for 1 week then decrease to one tablet by mouth twice daily. (Patient taking differently: Take one tablet by mouth twice daily for 1 week then decrease to one tablet by mouth twice daily.), Disp: 70 tablet, Rfl: 1    Vibegron (Gemtesa) 75 MG tablet, Take 1 tablet by mouth Daily., Disp: 90 tablet, Rfl: 3    Allergies:   No Known Allergies    Objective     Physical Exam:   Vital Signs:   Visit Vitals  /78 (BP Location: Right arm, Patient Position: Sitting, Cuff Size: Adult)   Pulse 78   Temp 98.2 °F (36.8 °C) (Temporal)   Ht 157.5 cm (62.01\")   Wt 59 kg (130 lb)   SpO2 100%   BMI 23.77 kg/m²      Body mass index is 23.77 kg/m².     Physical Exam  Vitals and nursing note reviewed. Exam conducted with a chaperone present.   Constitutional:       General: She is not in acute distress.     Appearance: Normal appearance. She is not ill-appearing.   Pulmonary:      Effort: Pulmonary effort is normal. No respiratory distress.   Genitourinary:     Comments: Decreased rugation, pale pink vulvar tissue, thin labia.  No lesions. Vaginal mucosa healthy no bleeding, discharge or tissue erosion.       Skin:     General: Skin is warm and dry.   Neurological:      General: No focal deficit present.      Mental Status: She is alert and oriented to person, place, and time.   Psychiatric:         Mood and Affect: Mood normal.         Behavior: Behavior normal.        Procedure     Pessary Removed:  Foldable ring w/ support - #3 w/o urethral bar easily. No vaginal discharge or bleeding noted    Vagina was " inspected and irrigated with acetic acid solution.    Pessary Re-Inserted:   Foldable ring w/ support - #3 w/o urethral bar with topical estradiol cream    Procedure was performed :without difficulty.    Assessment/Plan     Assessment:   Diagnoses and all orders for this visit:    1. Cystocele with prolapse (Primary)      Vane Villavicencio is a 79 y.o. female here for 3 month pessary cleaning. She has no complaints of discharge, bleeding or pain.     Patient will return if any bleeding or discharge concerns.  Continue topical vaginal estradiol 1g 2-3 nights weekly    Follow Up:   Return in about 14 weeks (around 5/6/2025) for Follow up Jim.    Jim Knight, MSN, APRN, FNP-C  [facility] [place] [facility] [place]

## 2025-02-07 ENCOUNTER — HOSPITAL ENCOUNTER (OUTPATIENT)
Dept: ULTRASOUND IMAGING | Facility: HOSPITAL | Age: 80
Discharge: HOME | End: 2025-02-07
Payer: MEDICARE

## 2025-02-07 DIAGNOSIS — I73.9 PERIPHERAL VASCULAR DISEASE, UNSPECIFIED: ICD-10-CM

## 2025-02-07 PROCEDURE — 93925 LOWER EXTREMITY STUDY: CPT

## 2025-05-13 ENCOUNTER — OFFICE VISIT (OUTPATIENT)
Dept: UROLOGY | Facility: CLINIC | Age: 80
End: 2025-05-13
Payer: MEDICARE

## 2025-05-13 VITALS
HEIGHT: 62 IN | TEMPERATURE: 98.4 F | DIASTOLIC BLOOD PRESSURE: 80 MMHG | WEIGHT: 130 LBS | HEART RATE: 78 BPM | BODY MASS INDEX: 23.92 KG/M2 | OXYGEN SATURATION: 100 % | SYSTOLIC BLOOD PRESSURE: 122 MMHG

## 2025-05-13 DIAGNOSIS — N81.4 CYSTOCELE WITH PROLAPSE: Primary | ICD-10-CM

## 2025-05-13 DIAGNOSIS — N39.41 URGE INCONTINENCE: ICD-10-CM

## 2025-05-13 NOTE — PROGRESS NOTES
Pessary Cleaning     Patient Name: Vane Villavicencio  : 1945   MRN: [id number]     Chief Complaint:   Chief Complaint   Patient presents with    Follow-up     Pessary clean       History of Present Illness: Vane Villavicencio is a 79 y.o. female with history below in assessment, who presents for pessary cleaning.  History of Present Illness  The patient is a 79-year-old female presenting for pessary cleaning.    Pessary Function  - Her pessary has been functioning well with no UTIs since insertion then was treated for a UTI while she was in [place] .  - No dysuria today.    COVID-19   - She had COVID-19 and sought medical also had bladder pressure this lead to antibiotic prescription, which caused bowel disturbances.    Nocturia  - She occasionally experiences nocturia.  - She attributes it to her sleep medication.         Subjective      Review of System:   As noted in HPI     Past Medical History:   Past Medical History:   Diagnosis Date    Asthma     Benign hypertension     Disease of thyroid gland     Family history of heart disease     Family history of heart disease     Hypercholesterolemia     Rheumatoid arthritis     Rheumatoid arthritis     Supraventricular tachycardia     first diagnosed 2012    Supraventricular tachycardia        Past Surgical History:   Past Surgical History:   Procedure Laterality Date    CARDIAC CATHETERIZATION N/A 7/10/2020    Procedure: Left Heart Cath;  Surgeon: Pankaj Pollard MD;  Location: [facility] CATH INVASIVE LOCATION;  Service: Cardiology;  Laterality: N/A;    SINUS SURGERY         Family History:   Family History   Problem Relation Age of Onset    Heart disease Other     Hypertension Sister        Social History:   Social History     Socioeconomic History    Marital status:    Tobacco Use    Smoking status: Former     Current packs/day: 0.00     Types: Cigarettes     Quit date: 1970     Years since quittin.4     Passive exposure: Past     Smokeless tobacco: Never   Vaping Use    Vaping status: Never Used   Substance and Sexual Activity    Alcohol use: No    Drug use: No    Sexual activity: Defer       Medications:     Current Outpatient Medications:     acetaminophen (TYLENOL) 325 MG tablet, Take 2 tablets by mouth Every 6 (Six) Hours As Needed for Mild Pain., Disp: , Rfl:     acetaminophen-codeine (TYLENOL with CODEINE #3) 300-30 MG per tablet, Take 1 tablet by mouth Every 4 (Four) Hours As Needed for Moderate Pain (take at bedtime). As needed, Disp: , Rfl:     albuterol sulfate HFA (ProAir HFA) 108 (90 Base) MCG/ACT inhaler, Inhale 2 puffs As Needed., Disp: , Rfl:     amLODIPine (NORVASC) 5 MG tablet, Take 1 tablet by mouth Daily., Disp: , Rfl:     aspirin 81 MG EC tablet, Take 1 tablet by mouth Daily., Disp: , Rfl:     atorvastatin (LIPITOR) 80 MG tablet, Take 1 tablet by mouth Every Night., Disp: 90 tablet, Rfl:     cholecalciferol (VITAMIN D3) 25 MCG (1000 UT) tablet, Take 2 tablets by mouth Daily., Disp: , Rfl:     Cyanocobalamin ER 1000 MCG tablet controlled-release, cyanocobalamin (vit B-12) ER 1,000 mcg tablet,extended release  TAKE 1 TABLET BY MOUTH EVERY DAY, Disp: , Rfl:     estradiol (ESTRACE) 0.1 MG/GM vaginal cream, Use externally as directed 3 nights weekly. Use 0.5gm, Disp: 42.5 g, Rfl: 3    famotidine (PEPCID) 20 MG tablet, , Disp: , Rfl:     ferrous sulfate 325 (65 FE) MG tablet, Take 1 tablet by mouth Daily With Breakfast. 3 times a week, Disp: , Rfl:     fluticasone (FLONASE) 50 MCG/ACT nasal spray, Administer 2 sprays into the nostril(s) as directed by provider As Needed. Administer 2 sprays in each nostril for each dose., Disp: , Rfl:     hydroCHLOROthiazide (HYDRODIURIL) 25 MG tablet, Take 1 tablet by mouth Daily., Disp: , Rfl:     hydroxychloroquine (PLAQUENIL) 200 MG tablet, , Disp: , Rfl:     ketoconazole (NIZORAL) 2 % cream, APPLY THIN LAYER TOPICALLY TO FEET TWICE DAILY UNTIL GONE, Disp: , Rfl:     leflunomide (ARAVA) 20  "MG tablet, Take 1 tablet by mouth Daily., Disp: , Rfl:     lisinopril (PRINIVIL,ZESTRIL) 20 MG tablet, Take 1 tablet by mouth Daily., Disp: , Rfl:     LORazepam (ATIVAN) 0.5 MG tablet, Take 1 tablet by mouth Every 8 (Eight) Hours As Needed for Anxiety., Disp: , Rfl:     metoprolol succinate XL (TOPROL-XL) 50 MG 24 hr tablet, , Disp: , Rfl:     pantoprazole (PROTONIX) 40 MG EC tablet, Take 1 tablet by mouth Daily., Disp: , Rfl:     potassium citrate (UROCIT-K) 10 MEQ (1080 MG) CR tablet, Take 1 tablet by mouth 2 (Two) Times a Day., Disp: , Rfl:     predniSONE (DELTASONE) 5 MG tablet, Take 1 tablet by mouth Daily., Disp: , Rfl:     rosuvastatin (CRESTOR) 20 MG tablet, , Disp: , Rfl:     sulfaSALAzine (AZULFIDINE) 500 MG tablet, Take 1 tablet by mouth 2 (Two) Times a Day., Disp: , Rfl:     valACYclovir (Valtrex) 500 MG tablet, Take two tablets by mouth twice daily for 1 week then decrease to one tablet by mouth twice daily. (Patient taking differently: Take one tablet by mouth twice daily for 1 week then decrease to one tablet by mouth twice daily.), Disp: 70 tablet, Rfl: 1    Vibegron (Gemtesa) 75 MG tablet, Take 1 tablet by mouth Daily., Disp: 90 tablet, Rfl: 3    Allergies:   No Known Allergies    Objective     Physical Exam:   Vital Signs:   Visit Vitals  /80 (BP Location: Right arm, Patient Position: Sitting, Cuff Size: Adult)   Pulse 78   Temp 98.4 °F (36.9 °C) (Temporal)   Ht 157.5 cm (62.01\")   Wt 59 kg (130 lb)   SpO2 100%   BMI 23.77 kg/m²      Body mass index is 23.77 kg/m².       Physical Exam  Pessary was removed and cleaned. No raw areas or discharge noted.       Procedure     Pessary Removed:  Foldable ring w/ support - #3 w/o urethral bar easily. No vaginal discharge or bleeding noted    Vagina was inspected and irrigated with acetic acid solution.    Pessary Re-Inserted:   Foldable ring w/ support - #3 w/o urethral bar with topical estradiol cream    Procedure was performed :without " difficulty.    Assessment/Plan     Assessment:   Diagnoses and all orders for this visit:    1. Cystocele with prolapse (Primary)    2. Urge incontinence       Assessment & Plan  1. Pessary cleaning  - Nocturnal enuresis suggests urge incontinence, not UTI  - Pessary could contribute to bacterial growth but no UTIs since insertion  - Pessary was removed and cleaned  - Contact clinic if UTI symptoms develop    2. Urge incontinence  - Nocturnal enuresis likely due to urge incontinence, possibly exacerbated by sleep medication  - Monitor symptoms and report changes    Follow-up  - Follow-up in 3 months    PROCEDURE  Pessary was removed and cleaned.       Vane Villavicencio is a 79 y.o. female     Patient will return if any bleeding or discharge concerns.  Continue topical vaginal estradiol 1g 2-3 nights weekly    Follow Up:   Return in about 3 months (around 8/13/2025) for Melisa Fernandez check.    Patient or patient representative verbalized consent for the use of Ambient Listening during the visit with  MIHAI Massey for chart documentation. 5/13/2025  14:48 EDT     Jim Knight, MSN, APRN, FNP-C  [facility]

## (undated) DEVICE — CATH DIAG EXPO .045 FL3  5F 100CM

## (undated) DEVICE — GUIDE CATHETER: Brand: MACH1™

## (undated) DEVICE — SKIN PREP TRAY W/CHG: Brand: MEDLINE INDUSTRIES, INC.

## (undated) DEVICE — DEV INFL MONARCH 25W

## (undated) DEVICE — GW INQWIRE FC PTFE STD J/1.5 .035 260

## (undated) DEVICE — INTRO SHEATH PRELUDE IDEAL SPRNG COIL 021 6F 23X80CM

## (undated) DEVICE — KT MANIFOLD CATHLAB CUST

## (undated) DEVICE — BND COMPR ZEPHYR VASC LG

## (undated) DEVICE — TREK CORONARY DILATATION CATHETER 2.50 MM X 15 MM / RAPID-EXCHANGE: Brand: TREK

## (undated) DEVICE — CATH DIAG EXPO M/ PK 5F FL4/FR4 PIG

## (undated) DEVICE — RADIFOCUS GLIDEWIRE: Brand: GLIDEWIRE

## (undated) DEVICE — NC TREK CORONARY DILATATION CATHETER 2.5 MM X 15 MM / RAPID-EXCHANGE: Brand: NC TREK

## (undated) DEVICE — GW LUGE .014 300CM